# Patient Record
Sex: MALE | Race: WHITE | NOT HISPANIC OR LATINO | Employment: OTHER | URBAN - METROPOLITAN AREA
[De-identification: names, ages, dates, MRNs, and addresses within clinical notes are randomized per-mention and may not be internally consistent; named-entity substitution may affect disease eponyms.]

---

## 2019-07-23 ENCOUNTER — OFFICE VISIT (OUTPATIENT)
Dept: FAMILY MEDICINE | Facility: CLINIC | Age: 59
End: 2019-07-23
Payer: COMMERCIAL

## 2019-07-23 VITALS
BODY MASS INDEX: 33.66 KG/M2 | HEIGHT: 65 IN | WEIGHT: 202 LBS | HEART RATE: 106 BPM | TEMPERATURE: 98.2 F | DIASTOLIC BLOOD PRESSURE: 68 MMHG | SYSTOLIC BLOOD PRESSURE: 122 MMHG | OXYGEN SATURATION: 96 %

## 2019-07-23 DIAGNOSIS — M17.0 OSTEOARTHRITIS OF BOTH KNEES, UNSPECIFIED OSTEOARTHRITIS TYPE: ICD-10-CM

## 2019-07-23 DIAGNOSIS — G47.33 OSA (OBSTRUCTIVE SLEEP APNEA): ICD-10-CM

## 2019-07-23 DIAGNOSIS — G47.00 INSOMNIA, UNSPECIFIED TYPE: Primary | ICD-10-CM

## 2019-07-23 PROCEDURE — 99203 OFFICE O/P NEW LOW 30 MIN: CPT | Performed by: NURSE PRACTITIONER

## 2019-07-23 RX ORDER — SENNOSIDES 8.6 MG
650 CAPSULE ORAL EVERY 8 HOURS PRN
Qty: 100 TABLET | Refills: 2 | Status: ON HOLD | OUTPATIENT
Start: 2019-07-23 | End: 2021-03-24

## 2019-07-23 RX ORDER — MULTIPLE VITAMINS W/ MINERALS TAB 9MG-400MCG
1 TAB ORAL DAILY
COMMUNITY

## 2019-07-23 RX ORDER — HYDROXYZINE HYDROCHLORIDE 25 MG/1
25-50 TABLET, FILM COATED ORAL
Qty: 60 TABLET | Refills: 2 | Status: SHIPPED | OUTPATIENT
Start: 2019-07-23 | End: 2019-07-24

## 2019-07-23 ASSESSMENT — ANXIETY QUESTIONNAIRES
GAD7 TOTAL SCORE: 7
IF YOU CHECKED OFF ANY PROBLEMS ON THIS QUESTIONNAIRE, HOW DIFFICULT HAVE THESE PROBLEMS MADE IT FOR YOU TO DO YOUR WORK, TAKE CARE OF THINGS AT HOME, OR GET ALONG WITH OTHER PEOPLE: SOMEWHAT DIFFICULT
1. FEELING NERVOUS, ANXIOUS, OR ON EDGE: SEVERAL DAYS
7. FEELING AFRAID AS IF SOMETHING AWFUL MIGHT HAPPEN: SEVERAL DAYS
2. NOT BEING ABLE TO STOP OR CONTROL WORRYING: MORE THAN HALF THE DAYS
5. BEING SO RESTLESS THAT IT IS HARD TO SIT STILL: NOT AT ALL
3. WORRYING TOO MUCH ABOUT DIFFERENT THINGS: MORE THAN HALF THE DAYS
6. BECOMING EASILY ANNOYED OR IRRITABLE: NOT AT ALL

## 2019-07-23 ASSESSMENT — PATIENT HEALTH QUESTIONNAIRE - PHQ9
5. POOR APPETITE OR OVEREATING: SEVERAL DAYS
SUM OF ALL RESPONSES TO PHQ QUESTIONS 1-9: 6

## 2019-07-23 ASSESSMENT — MIFFLIN-ST. JEOR: SCORE: 1648.15

## 2019-07-23 NOTE — PROGRESS NOTES
Subjective     Meño Sánchez is a 59 year old male who presents to clinic today for the following health issues:    HPI   Insomnia  Onset:  Years , wakes up at 2 am and goes to bed maybe 8-9 am, pretty often   Difficulty with sleeping more than 1-2 hours at a time.     Previously used a CPAP for a short period.  History of TAYA.  He states that he lost weight and feels like things improved.    Sleep study approximately 3 years ago.        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   Caffeine intake: YES- diet cola   OTC decongestants: no but he has   Any new medications: but a friend gave him some OTC cleansing pills    Alleviating factors:  Self medicating (alcohol, etc.):  no    Therapies Tried and outcome: Melatonin - rarely works.      He reports rumination at nighttime.  Difficulty with stress.  Is vague regarding this.    Gets anxious regarding his health and when he is sick, he isn't able to sing.    Gets stressed about finances.   Intermitttent anxiety.      Joint Pain    Onset: years, had a right knee surgery- meniscus 5-6 years ago, thinks its from a previous work, used to walk a lot.    Description:   Location: left knee and right knee  Character: general pain    Intensity: moderate    Progression of Symptoms: worse    Accompanying Signs & Symptoms:  Other symptoms: none    History:    Previous similar pain: YES      Precipitating factors:   Trauma or overuse: no     Alleviating factors:  Improved by: rest/inactivity, exercise - does home exercise (Mo).      Therapies Tried and outcome: nothing, topical cream- hasn't really tried it.     Last seen by orthopedics at [facility] (6/6/17) for bilateral knee pain, severe medial compartment degenerative joint disease (left knee), early to mild degenerative joint disease/chrondromalacia (right knee), ACL ligament degeneration (right knee).   Didn't complete physical therapy as previously recommended.  Steroid injections were not helpful.      History of right knee arthroscopic partial medial meniscectomy (8/3/2012).      Previous clinic:  [facility], 4-5 years ago.      , currently single.  1 child - daughter who is 24 years old.   Is working part-time - singing.        Patient Active Problem List   Diagnosis     Family history of early CAD     Elevated hemoglobin A1c     History of gout     History of knee surgery     Hyperlipidemia     Obstructive sleep apnea syndrome     Past Surgical History:   Procedure Laterality Date     HC TOOTH EXTRACTION W/FORCEP      wisdom tooth x 4       Social History     Tobacco Use     Smoking status: Former Smoker     Smokeless tobacco: Never Used     Tobacco comment: 8 cigarettes per day   Substance Use Topics     Alcohol use: Yes     Comment: 2 / beers qd     Family History   Problem Relation Age of Onset     Cancer - colorectal Father         dx at age 67     Cardiovascular Father         AAA s/p rupture and repair     C.A.D. Father      Family History Negative Mother          Current Outpatient Medications   Medication Sig Dispense Refill     acetaminophen (TYLENOL) 650 MG CR tablet Take 1 tablet (650 mg) by mouth every 8 hours as needed for mild pain or fever 100 tablet 2     co-enzyme Q-10 30 MG CAPS capsule Take by mouth daily       multivitamin w/minerals (MULTI-VITAMIN)  "tablet Take 1 tablet by mouth daily       hydrOXYzine (VISTARIL) 25 MG capsule Take 1-2 capsules (25-50 mg) by mouth nightly as needed for other (insomnia) 60 capsule 3     Allergies   Allergen Reactions     No Known Drug Allergies        Reviewed and updated as needed this visit by Provider         Review of Systems   ROS COMP: Constitutional, HEENT, cardiovascular, pulmonary, gi and gu systems are negative, except as otherwise noted.      Objective    /68 (BP Location: Right arm, Patient Position: Sitting, Cuff Size: Adult Regular)   Pulse 106   Temp 98.2  F (36.8  C) (Oral)   Ht 1.651 m (5' 5\")   Wt 91.6 kg (202 lb)   SpO2 96%   BMI 33.61 kg/m    Body mass index is 33.61 kg/m .  Physical Exam   GENERAL: healthy, alert and no distress  RESP: lungs clear to auscultation - no rales, rhonchi or wheezes  CV: regular rate and rhythm, normal S1 S2, no S3 or S4, no murmur, click or rub, no peripheral edema and peripheral pulses strong  MS: bilateral knees with full ROM (pain elicited with full flexion and extension), non tender to palpation, no erythema or warmth to joints, no joint instability  NEURO: Normal strength and tone, mentation intact and speech normal  PSYCH: mentation appears normal and affect flat        Assessment & Plan     Meño was seen today for musculoskeletal problem.    Diagnoses and all orders for this visit:    Insomnia, unspecified type  -     hydrOXYzine (ATARAX) 25 MG tablet; Take 1-2 tablets (25-50 mg) by mouth nightly as needed for other (insomnia)    TAYA (obstructive sleep apnea)  -     SLEEP EVALUATION & MANAGEMENT REFERRAL - [facility]  [phone] (Age 18 and up); Future    Osteoarthritis of both knees, unspecified osteoarthritis type  Last seen by orthopedics at [facility] (6/6/17) for bilateral knee pain, severe medial compartment degenerative joint disease (left knee), early to mild degenerative joint disease/chrondromalacia (right " knee), ACL ligament degeneration (right knee).   Didn't complete physical therapy as previously recommended.  Steroid injections were not helpful.    History of right knee arthroscopic partial medial meniscectomy (8/3/2012).       -     acetaminophen (TYLENOL) 650 MG CR tablet; Take 1 tablet (650 mg) by mouth every 8 hours as needed for mild pain or fever  -     [facility] REFERRAL; Future - encouraged initiation of physical therapy.   -     With no improvement or worsening of symptoms, will plan consultation with orthopedics.            Return in about 1 month (around 8/23/2019) for Physical Exam/fasting lab work.    LILI Esqueda [facility]

## 2019-07-24 ENCOUNTER — TELEPHONE (OUTPATIENT)
Dept: FAMILY MEDICINE | Facility: CLINIC | Age: 59
End: 2019-07-24

## 2019-07-24 DIAGNOSIS — G47.09 OTHER INSOMNIA: Primary | ICD-10-CM

## 2019-07-24 RX ORDER — HYDROXYZINE PAMOATE 25 MG/1
25-50 CAPSULE ORAL
Qty: 60 CAPSULE | Refills: 3 | Status: SHIPPED | OUTPATIENT
Start: 2019-07-24 | End: 2020-10-15

## 2019-07-24 ASSESSMENT — ANXIETY QUESTIONNAIRES: GAD7 TOTAL SCORE: 7

## 2019-07-24 NOTE — TELEPHONE ENCOUNTER
Prior Authorization Retail Medication Request    Medication/Dose: Hydroxyzine HCL 25mg tabs  ICD code (if different than what is on RX):    Previously Tried and Failed:  See chart  Rationale:      Insurance Name:  Not listed  Insurance ID:  [id number]      Pharmacy Information (if different than what is on RX)  Name:  Shanel HOANG  Phone:  [phone]    Prescriber, please advise on if you want to change medication or initiate PA.  Corina Gaitan

## 2019-08-13 ENCOUNTER — THERAPY VISIT (OUTPATIENT)
Dept: PHYSICAL THERAPY | Facility: CLINIC | Age: 59
End: 2019-08-13
Attending: NURSE PRACTITIONER
Payer: COMMERCIAL

## 2019-08-13 DIAGNOSIS — M25.561 BILATERAL KNEE PAIN: ICD-10-CM

## 2019-08-13 DIAGNOSIS — M25.562 BILATERAL KNEE PAIN: ICD-10-CM

## 2019-08-13 DIAGNOSIS — M17.0 OSTEOARTHRITIS OF BOTH KNEES, UNSPECIFIED OSTEOARTHRITIS TYPE: ICD-10-CM

## 2019-08-13 PROCEDURE — 97161 PT EVAL LOW COMPLEX 20 MIN: CPT | Mod: GP | Performed by: PHYSICAL THERAPIST

## 2019-08-13 PROCEDURE — 97110 THERAPEUTIC EXERCISES: CPT | Mod: GP | Performed by: PHYSICAL THERAPIST

## 2019-08-13 ASSESSMENT — ACTIVITIES OF DAILY LIVING (ADL)
WALK: ACTIVITY IS FAIRLY DIFFICULT
SQUAT: ACTIVITY IS MINIMALLY DIFFICULT
HOW_WOULD_YOU_RATE_THE_CURRENT_FUNCTION_OF_YOUR_KNEE_DURING_YOUR_USUAL_DAILY_ACTIVITIES_ON_A_SCALE_FROM_0_TO_100_WITH_100_BEING_YOUR_LEVEL_OF_KNEE_FUNCTION_PRIOR_TO_YOUR_INJURY_AND_0_BEING_THE_INABILITY_TO_PERFORM_ANY_OF_YOUR_USUAL_DAILY_ACTIVITIES?: 40
SIT WITH YOUR KNEE BENT: ACTIVITY IS MINIMALLY DIFFICULT
WEAKNESS: THE SYMPTOM AFFECTS MY ACTIVITY SEVERELY
KNEE_ACTIVITY_OF_DAILY_LIVING_SUM: 35
STAND: ACTIVITY IS SOMEWHAT DIFFICULT
RISE FROM A CHAIR: ACTIVITY IS FAIRLY DIFFICULT
STIFFNESS: THE SYMPTOM AFFECTS MY ACTIVITY SLIGHTLY
PAIN: THE SYMPTOM AFFECTS MY ACTIVITY MODERATELY
GIVING WAY, BUCKLING OR SHIFTING OF KNEE: THE SYMPTOM AFFECTS MY ACTIVITY SLIGHTLY
KNEEL ON THE FRONT OF YOUR KNEE: ACTIVITY IS VERY DIFFICULT
LIMPING: THE SYMPTOM AFFECTS MY ACTIVITY MODERATELY
GO DOWN STAIRS: ACTIVITY IS FAIRLY DIFFICULT
GO UP STAIRS: ACTIVITY IS VERY DIFFICULT
HOW_WOULD_YOU_RATE_THE_OVERALL_FUNCTION_OF_YOUR_KNEE_DURING_YOUR_USUAL_DAILY_ACTIVITIES?: ABNORMAL
RAW_SCORE: 35
AS_A_RESULT_OF_YOUR_KNEE_INJURY,_HOW_WOULD_YOU_RATE_YOUR_CURRENT_LEVEL_OF_DAILY_ACTIVITY?: ABNORMAL
KNEE_ACTIVITY_OF_DAILY_LIVING_SCORE: 50
SWELLING: I DO NOT HAVE THE SYMPTOM

## 2019-08-13 NOTE — PROGRESS NOTES
[facility] Initial Evaluation  Subjective:  The history is provided by the patient. No  was used.   Type of problem:  Bilateral knees   Condition occurred with:  Insidious onset. This is a chronic condition   Problem details: Patient reports a gradual worsening of knee pain beginning about 15 years ago.  Patient c/o pain going up/down stairs and with lifting.  Physical therapy was ordered 7/23/2019.  Patient denies any catching, instability, or swelling.  Right knee arthroscopy about 5 years ago for a mensicus tear.  Patient quit his job as a  a couple years ago d/t knee pain.   Patient reports pain:  Posterior.  Associated symptoms:  Loss of motion/stiffness. Symptoms are exacerbated by ascending stairs, descending stairs, transfers and other (lifting and carrying) and relieved by rest.    Where condition occurred: for unknown reasons.  and reported as 7/10 on pain scale. General health as reported by patient is fair. Pertinent medical history includes:  Overweight and sleep disorder/apnea.  Medical allergies: none.  Surgeries include:  Orthopedic surgery (right scope).     Primary job tasks include:  Lifting/carrying.  Pain is described as aching and is constant. Pain is worse in the A.M.. Since onset symptoms are gradually worsening. Special tests:  X-ray (a few years ago). Past treatment: none.    Patient is Musician - parttime.   Barriers include:  None as reported by patient.  Red flags:  None as reported by patient.                      Objective:  Standing Alignment:              Knee:  Genu varus L and genu varus R      Gait:    Gait Type:  Normal                                                           Knee Evaluation:  ROM:    AROM      Extension:  Left: 5    Right:  5  Flexion: Left: 120    Right: 115  PROM      Extension: Left: 5    Right:  5  Flexion: Left: 125    Right:  120    Endfeel: Firm  Strength:     Extension:  Left: 5/5    Pain:-      Right:  5/5    Pain:-  Flexion:  Left: 5-/5    Pain:+      Right: 5-/5    Pain:+    Quad Set Left: Fair    Pain:   Quad Set Right: Fair    Pain:  Ligament Testing:    Varus 0:  Left:  Neg   Right:  Neg  Varus 30:  Left:  Trace  Right:  Trace  Valgus 0:  Left:  Neg  Right:  Neg  Valgus 30:  Left:  Neg    Right:  Neg            Edema:  Edema of the knee: Mild chronic B.            General     ROS    Assessment/Plan:    Patient is a 59 year old male with both sides knee complaints.    Patient has the following significant findings with corresponding treatment plan.                Diagnosis 1:  B Knee OA/DJD  Pain -  self management, education and home program  Decreased ROM/flexibility - manual therapy, therapeutic exercise, therapeutic activity and home program  Decreased strength - therapeutic exercise, therapeutic activities and home program  Impaired muscle performance - neuro re-education and home program  Decreased function - therapeutic activities and home program    Therapy Evaluation Codes:   1) History comprised of:   Personal factors that impact the plan of care:      None.    Comorbidity factors that impact the plan of care are:      Overweight.     Medications impacting care: None.  2) Examination of Body Systems comprised of:   Body structures and functions that impact the plan of care:      Knee.   Activity limitations that impact the plan of care are:      Lifting, Squatting/kneeling and Stairs.  3) Clinical presentation characteristics are:   Stable/Uncomplicated.  4) Decision-Making    Low complexity using standardized patient assessment instrument and/or measureable assessment of functional outcome.  Cumulative Therapy Evaluation is: Low complexity.    Previous and current functional limitations:  (See Goal Flow Sheet for this information)    Short term and Long term goals: (See Goal Flow Sheet for this information)     Communication ability:  Patient appears to be able to clearly communicate and understand  verbal and written communication and follow directions correctly.  Treatment Explanation - The following has been discussed with the patient:   RX ordered/plan of care  Anticipated outcomes  Possible risks and side effects  This patient would benefit from PT intervention to resume normal activities.   Rehab potential is good.    Frequency:  1 X week, once daily  Duration:  for 6 weeks  Discharge Plan:  Achieve all LTG.  Independent in home treatment program.  Reach maximal therapeutic benefit.    Please refer to the daily flowsheet for treatment today, total treatment time and time spent performing 1:1 timed codes.

## 2020-07-31 ENCOUNTER — OFFICE VISIT (OUTPATIENT)
Dept: FAMILY MEDICINE | Facility: CLINIC | Age: 60
End: 2020-07-31
Payer: COMMERCIAL

## 2020-07-31 VITALS
HEART RATE: 130 BPM | HEIGHT: 65 IN | DIASTOLIC BLOOD PRESSURE: 88 MMHG | WEIGHT: 200 LBS | OXYGEN SATURATION: 95 % | TEMPERATURE: 98.5 F | SYSTOLIC BLOOD PRESSURE: 130 MMHG | BODY MASS INDEX: 33.32 KG/M2

## 2020-07-31 DIAGNOSIS — M25.562 PAIN IN BOTH KNEES, UNSPECIFIED CHRONICITY: ICD-10-CM

## 2020-07-31 DIAGNOSIS — M25.561 PAIN IN BOTH KNEES, UNSPECIFIED CHRONICITY: ICD-10-CM

## 2020-07-31 DIAGNOSIS — E11.9 TYPE 2 DIABETES MELLITUS WITHOUT COMPLICATION, WITHOUT LONG-TERM CURRENT USE OF INSULIN (H): Primary | ICD-10-CM

## 2020-07-31 DIAGNOSIS — E11.42 DIABETIC POLYNEUROPATHY ASSOCIATED WITH TYPE 2 DIABETES MELLITUS (H): ICD-10-CM

## 2020-07-31 LAB — HBA1C MFR BLD: 11 % (ref 0–5.6)

## 2020-07-31 PROCEDURE — 83036 HEMOGLOBIN GLYCOSYLATED A1C: CPT | Performed by: FAMILY MEDICINE

## 2020-07-31 PROCEDURE — 80053 COMPREHEN METABOLIC PANEL: CPT | Performed by: FAMILY MEDICINE

## 2020-07-31 PROCEDURE — 84443 ASSAY THYROID STIM HORMONE: CPT | Performed by: FAMILY MEDICINE

## 2020-07-31 PROCEDURE — 99214 OFFICE O/P EST MOD 30 MIN: CPT | Performed by: FAMILY MEDICINE

## 2020-07-31 PROCEDURE — 36415 COLL VENOUS BLD VENIPUNCTURE: CPT | Performed by: FAMILY MEDICINE

## 2020-07-31 RX ORDER — FLASH GLUCOSE SENSOR
1 KIT MISCELLANEOUS
Qty: 6 EACH | Refills: 3 | Status: SHIPPED | OUTPATIENT
Start: 2020-07-31 | End: 2021-09-15

## 2020-07-31 ASSESSMENT — MIFFLIN-ST. JEOR: SCORE: 1634.07

## 2020-07-31 NOTE — PROGRESS NOTES
"Subjective   Meño Sánchez is a 60 year old male who presents to clinic today for the following health issues:    HPI   Knee Pain,  toe numbness    Onset: ongoing knee pain 15 years ago - numbness x 2 years    Description:   Location: both knees and feet  Character: achy- numbness all the time    Intensity: moderate    Progression of Symptoms: started out with numbness - shooting pains in legs    Accompanying Signs & Symptoms:  Other symptoms: numbness and tingling    History:   Previous similar pain: YES  - had mensicus procedure     Precipitating factors:   Trauma or overuse: pt worked 23 years walking on hard floor.    Alleviating factors:  Improved by: rest/inactivity    Therapies Tried and outcome: rest- pt is retired and does not walk a lot now    Insomnia/anxiety  Chronic - rare caffeine - melatonin did not help much, hydroxyzine makes him feel buzzed,  Discuss CBD oil for sleep.  Has some anxiety symptoms. Panic attacks ~ 25 years ago - better now.    Reviewed and updated as needed this visit by provider:  Tobacco  Allergies  Meds  Problems  Med Hx  Surg Hx  Fam Hx         Review of Systems   Constitutional, HEENT, cardiovascular, pulmonary, GI, , musculoskeletal, neuro, skin, endocrine and psych systems are negative, except as otherwise noted.        Objective   /88   Pulse 130   Temp 98.5  F (36.9  C) (Tympanic)   Ht 1.651 m (5' 5\")   Wt 90.7 kg (200 lb)   SpO2 95%   BMI 33.28 kg/m   Body mass index is 33.28 kg/m .  Physical Exam   GENERAL: healthy, alert, well nourished, well hydrated, no distress  HENT: ear canals- normal; TMs- normal; Nose- normal; Mouth- no ulcers, no lesions  NECK: no tenderness, no adenopathy, no asymmetry, no masses, no stiffness; thyroid- normal to palpation  RESP: lungs clear to auscultation - no rales, no rhonchi, no wheezes  CV: regular rates and rhythm, normal S1 S2, no S3 or S4 and no murmur, no click or rub -  ABDOMEN: soft, no tenderness, no  " hepatosplenomegaly, no masses, normal bowel sounds  MS: Bilateral knees with slight varus deformity and medial joint line tenderness, ligaments intact and negative meniscal signs.  No effusion.  Good extension flexion but some pain with full flexion bilaterally.  Otherwise extremities- no gross deformities noted, no edema  SKIN: no suspicious lesions, no rashes  NEURO: strength and tone- normal, sensory exam- grossly normal, mentation- intact, speech- normal, reflexes- symmetric  Diabetic foot exam: normal DP and PT pulses, no trophic changes or ulcerative lesions and reduced sensation at across all toes and less so on the forefoot with monofilament test bilaterally      Diagnostic Test Results  Results for orders placed or performed in visit on 07/31/20 (from the past 24 hour(s))   Hemoglobin A1c   Result Value Ref Range    Hemoglobin A1C 11.0 (H) 0 - 5.6 %         Assessment & Plan   Type 2 diabetes mellitus without complication, without long-term current use of insulin (H)  Uncontrolled, not actively treated with diet nor exercise or medications.  Did do diabetic education years ago at another health system and is open to reeducation.  Will initiate metformin at 500 twice daily and increase to thousand twice daily as tolerated.  In addition we will start Jardiance.  Glucose meter prescription sent as well and eye referral.  - Hemoglobin A1c  - Comprehensive metabolic panel (BMP + Alb, Alk Phos, ALT, AST, Total. Bili, TP)  - TSH with free T4 reflex  - metFORMIN (GLUCOPHAGE) 500 MG tablet  Dispense: 360 tablet; Refill: 1  - Continuous Blood Gluc Sensor (FREESTYLE CHAYA 14 DAY SENSOR) MISC  Dispense: 6 each; Refill: 3  - AMBULATORY ADULT DIABETES EDUCATOR REFERRAL  - empagliflozin (JARDIANCE) 10 MG TABS tablet  Dispense: 90 tablet; Refill: 1  - OPHTHALMOLOGY ADULT REFERRAL    Pain in both knees, unspecified chronicity  Previous x-ray results reviewed showing medial compartment joint space narrowing.  Has some  tenderness in this region and pain with full flexion.  Ligaments intact  - Orthopedic & Spine  Referral    Peripheral polyneuropathy  Most likely diabetic related with A1c of 11 today.  Should improve with diabetic control.    - Comprehensive metabolic panel (BMP + Alb, Alk Phos, ALT, AST, Total. Bili, TP)  - TSH with free T4 reflex    See Patient Instructions    Return in about 1 month (around 8/31/2020) for Medication Recheck.          Aureliano Mckeon MD      [address] [address]  [email or web address]   Office: [phone]  Pager: [phone]

## 2020-07-31 NOTE — PATIENT INSTRUCTIONS
"[facility] [place] [facility]       Patient Education   Tips for Sleep Hygiene  \"Sleep hygiene\" means having good sleep habits.Follow these tips to sleep better at night:     Get on a schedule. Go to bed and get up at about the same time every day.    Listen to your body. Only try to sleep when you actually feel tired or sleepy.    Be patient. If you haven't been able to get to sleep after about 30 minutes or more, get up and do something calming or boring until you feel sleepy. Then return to bed and try again.    Don't have caffeine (coffee, tea, cola drinks, chocolate and some medicines), alcohol or nicotine (cigarettes). These can make it harder for you to fall asleep and stay asleep.    Use your bed for sleeping only. That means no TV, computer or homework in bed, especially during the evening and before bedtime.    Don't nap during the day. If you must nap, make sure it is for less than 20 minutes.    Create sleep rituals that remind your body it is time to sleep. Examples include breathing exercises, stretching or reading a book.    Avoid all electronic media (smart phone, computer, tablet) within 2 hours of bed time. The \"blue light\" in these devices activates the part of the brain that keeps you awake.    Dim the lights at night.    Get early morning sources of light (walk in the sunshine) to help set sleep patterns at night.    Try a bath or shower before bed. Having a warm bath 1 to 2 hours before bedtime can help you feel sleepy. Hot baths can make you alert, so be mindful of the temperature.    Don't watch the clock. Checking the clock during the night can wake you up. It can also lead to negative thoughts such as, \"I will never fall asleep,\" which can increase anxiety and sleeplessness.    Use a sleep diary. Track your sleep schedule to know your sleep patterns and to see where you can improve.    Get regular exercise every day. Try not to do heavy exercise in the 4 hours before bedtime.    Eat a " healthy, balanced diet.    Try eating a light, healthy snack before bed, but avoid eating a heavy meal.    Create the right sleeping area. A cool, dark, quiet room is best. If needed, try earplugs, fans and blackout curtains.    Keep your daytime routine the same even if you have a bad night sleep. Avoiding activities the next day can make it harder to sleep.  For informational purposes only. Not to replace the advice of your health care provider.   Copyright   2013 [facility]. All rights reserved. [employer] 507345 - 01/16.      Patient Education What Is Diabetes?  If you have diabetes, your body does not make enough insulin (a hormone), or the insulin it makes does not work the way it should. Diabetes is a lifelong disease.  Glucose (sugar) is your body's main source of energy. Insulin carries glucose from the bloodstream into your body's cells. But if you have diabetes, glucose builds up in your blood. This is called high blood glucose (high blood sugar).  High blood glucose can lead to damage in many parts of your body, including your eyes, kidneys, heart, blood vessels, nerves and skin.  What are the symptoms of diabetes?  Symptoms include:    Extreme thirst    Needing to urinate more often    Headache    Hunger    Blurred vision    Feeling drowsy or tired    Slow healing after an illness or injury    Frequent infections.  What should I do if I have diabetes?  Your first step is to learn how to manage your diabetes. The goal is to keep your blood glucose as close to normal as possible. (A normal level is 70 to 100.) By doing this, you can prevent or control damage to your body.  To manage your diabetes, you will need to:    Eat a wide range of healthy foods.    Manage your weight.    Be physically active.    Test your blood glucose as prescribed.    Control your blood pressure and cholesterol.    Take your medicines as prescribed.  Are there different kinds of diabetes?  There are two basic kinds  of diabetes: type 1 and type 2.  Type 1 diabetes  Type 1 diabetes occurs when the cells that make insulin are destroyed by your body's immune system. Your body can no longer make insulin on its own. People who have type 1 diabetes must take insulin to manage it.  Type 2 diabetes  Type 2 diabetes occurs when the cells of your body cannot use insulin or glucose normally. Over time, your body cannot make enough insulin to meet your body's needs. This is the most common kind of diabetes.  You are more likely to develop type 2 diabetes if you:    Are overweight    Have high blood pressure    Have high cholesterol    Are not physically active    Have a family history of type 2 diabetes    Are , /, ,  American or     Are a woman who has given birth to a baby weighing over 9 pounds, or you have had gestational diabetes (diabetes that occurs in pregnancy).  For informational purposes only. Not to replace the advice of your health care provider.  Copyright   2006 [facility]. All rights reserved. [employer] [id number] - REV 12/15.     Patient Education   Diet: Diabetes  Food is an important tool that you can use to control diabetes and stay healthy. Eating well-balanced meals in the correct amounts will help you control your blood glucose levels and prevent low blood sugar reactions. It will also help you reduce the health risks of diabetes. There is no one specific diet that is right for everyone with diabetes. But there are general guidelines to follow. A registered dietitian (RD) will create a tailored diet approach that s just right for you. He or she will also help you plan healthy meals and snacks. If you have any questions, call your dietitian for advice.     Guidelines for success  Talk with your healthcare provider before starting a diabetes diet or weight loss program. If you haven't talked with a dietitian yet, ask your provider for a  referral. The following guidelines can help you succeed:    Select foods from the 6 food groups below. Your dietitian will help you find food choices within each group. He or she will also show you serving sizes and how many servings you can have at each meal.  ? Grains, beans, and starchy vegetables  ? Vegetables  ? Fruit  ? Milk or yogurt  ? Meat, poultry, fish, or tofu  ? Healthy fats    Check your blood sugar levels as directed by your provider. Take any medicine as prescribed by your provider.    Learn to read food labels and pick the right portion sizes.    Eat only the amount of food in your meal plan. Eat about the same amount of food at regular times each day. Don t skip meals. Eat meals 4 to 5 hours apart, with snacks in between.    Limit alcohol. It raises blood sugar levels. Drink water or calorie-free diet drinks that use safe sweeteners.    Eat less fat to help lower your risk of heart disease. Use nonfat or low-fat dairy products and lean meats. Avoid fried foods. Use cooking oils that are unsaturated, such as olive, canola, or peanut oil.    Talk with your dietitian about safe sugar substitutes.    Avoid added salt. It can contribute to high blood pressure, which can cause heart disease. People with diabetes already have a risk of high blood pressure and heart disease.    Stay at a healthy weight. If you need to lose weight, cut down on your portion sizes. But don t skip meals. Exercise is an important part of any weight management program. Talk with your provider about an exercise program that s right for you.    For more information about the best diet plan for you, talk with a registered dietitian (RD). To find an RD in your area, contact:  ? Academy of Nutrition and Dietetics www.eatright.org  ? The American Diabetes Association [phone] www.diabetes.org  Date Last Reviewed: 8/1/2016 2000-2018 [employer]. [address] [place] [address], [place] [address]. All rights reserved. This  information is not intended as a substitute for professional medical care. Always follow your healthcare professional's instructions.         Patient Education   Healthy Meals for Diabetes     A healthcare provider will help you develop a meal plan that fits your needs.     Ask your healthcare team to help you make a meal plan that fits your needs. Your meal plan tells you when to eat your meals and snacks, what kinds of foods to eat, and how much of each food to eat. You don t have to give up all the foods you like. But you do need to follow some guidelines.  Choose healthy carbohydrates  Starches, sugars, and fiber are all types of carbohydrates. Fiber can help lower your cholesterol and triglycerides. Fiber is also healthy for your heart. You should have 20 to 35 grams of total fiber each day. Fiber-rich foods include:    Whole-grain breads and cereals    Bulgur wheat    Brown rice       Whole-wheat pasta    Fruits and vegetables    Dry beans, and peas   Keep track of the amount of carbohydrates you eat. This can help you keep the right balance of physical activity and medicine. The amount of carbohydrates needed will vary for each person. It depends on many things such as your health, the medicines you take, and how active you are. Your healthcare team will help you figure out the right amount of carbohydrates for you. You may start with around 45 to 60 grams of carbohydrates per meal, depending on your situation.   Here are some examples of foods containing about 15 grams of carbohydrates (1 serving of carbohydrates):    1/2 cup of canned or frozen fruit    A small piece of fresh fruit (4 ounces)    1 slice of bread    1/2 cup of oatmeal    1/3 cup of rice    4 to 6 crackers    1/2 English muffin    1/2 cup of black beans    1/4 of a large baked potato (3 ounces)    2/3 cup of plain fat-free yogurt    1 cup of soup    1/2 cup of casserole    6 chicken nuggets    2-inch-square brownie or cake without  frosting    2 small cookies    1/2 cup of ice cream or sherbet  Choose healthy protein foods  Eating protein that is low in fat can help you control your weight. It also helps keep your heart healthy. Low-fat protein foods include:    Fish    Plant proteins, such as dry beans and peas, nuts, and soy products like tofu and soymilk    Lean meat with all visible fat removed    Poultry with the skin removed    Low-fat or nonfat milk, cheese, and yogurt  Limit unhealthy fats and sugar  Saturated and trans fats are unhealthy for your heart. They raise LDL (bad) cholesterol. Fat is also high in calories, so it can make you gain weight. To cut down on unhealthy fats and sugar, limit these foods:    Butter or margarine    Palm and palm kernel oils and coconut oil    Cream    Cheese    Lozano    Lunch meats       Ice cream    Sweet bakery goods such as pies, muffins, and donuts    Jams and jellies    Candy bars    Regular sodas   How much to eat  The amount of food you eat affects your blood sugar. It also affects your weight. Your healthcare team will tell you how much of each type of food you should eat.    Use measuring cups and spoons and a food scale to measure serving sizes.    Learn what a correct serving size looks like on your plate. This will help when you are away from home and can t measure your servings.    Eat only the number of servings given on your meal plan for each food. Don t take seconds.    Learn to read food labels. Be sure to look at serving size, total carbohydrates, fiber, calories, sugar, and saturated and trans fats. Look for healthier alternatives to foods that have added sugar.    Plan ahead for parties so you can still have a good time without going overboard with unhealthy food choices. Set a good example yourself by bringing a healthy dish to pot lucks.   Choose healthy snacks  When it comes to snacks, we usually think about foods with added sugar and fats. But there are many other options for  healthier snack choices. Here are a few snack ideas to choose from:  Snacks with less than 5 grams of carbohydrates    1 piece of string cheese    3 celery sticks plus 1 tablespoon of peanut butter    5 cherry tomatoes plus 1 tablespoon of ranch dressing    1 hard-boiled egg    1/4 cup of fresh blueberries     5 baby carrots    1 cup of light popcorn    1/2 cup of sugar-free gelatin    15 almonds  Snacks with about 10 to 20 grams of carbohydrates    1/3 cup of hummus plus 1 cup of fresh cut nonstarchy vegetables (carrots, green peppers, broccoli, celery, or a combination)    1/2 cup of fresh or canned fruit plus 1/4 cup of cottage cheese    1/2 cup of tuna salad with 4 crackers    2 rice cakes and a tablespoon of peanut butter    1 small apple or orange    3 cups light popcorn    1/2 of a turkey sandwich (1 slice of whole-wheat bread, 2 ounces of turkey, and mustard)  Portion sizes are important to controlling your blood sugar and staying at a healthy weight. Stock up on healthy snack items so you always have them on hand.  When to eat  Your meal plan will likely include breakfast, lunch, dinner, and some snacks.    Try to eat your meals and snacks at about the same times each day.    Eat all your meals and snacks. Skipping a meal or snack can make your blood sugar drop too low. It can also cause you to eat too much at the next meal or snack. Then your blood sugar could get too high.  Date Last Reviewed: 7/1/2016 2000-2018 [employer]. [address] [place] [address]. All rights reserved. This information is not intended as a substitute for professional medical care. Always follow your healthcare professional's instructions.         Patient Education   Diabetes: Meal Planning    You can help keep your blood sugar level in your target range by eating healthy foods. Your healthcare team can help you create a low-fat, nutritious meal plan. Take an active role in your diabetes management by  following your meal plan and working with your healthcare team.  Make your meal plan  A meal plan gives guidelines for the types and amounts of food you should eat. The goal is to balance food and insulin (or other diabetes medications) so your blood sugars will be in your target range. Your dietitian will help you make a flexible meal plan that includes many foods that you like.  Watch serving sizes  Your meal plan will group foods by servings. To learn how much a serving is, start by measuring food portions at each meal. Soon you ll know what a serving looks like on your plate. Ask your healthcare provider about how to balance servings of different foods.  Eat from all the food groups  The basis of a healthy meal plan is variety (eating lots of different foods). Choose lean meats, fresh fruits and vegetables, whole grains, and low-fat or nonfat dairy products. Eating a wide variety of foods provides the nutrients your body needs. It can also keep you from getting bored with your meal plan.  Learn about carbohydrates, fats, and protein    Carbohydrates are starches, sugars, and fiber. They are found in many foods, including fruit, bread, pasta, milk, and sweets. Of all the foods you eat, carbohydrates have the most effect on your blood sugar. Your dietitian may teach you about carb counting, a way to figure out the number of carbohydrates in a meal.    Fats have the most calories. They also have the most effect on your weight and your risk of heart disease. When you have diabetes, it s important to control your weight and protect your heart. Foods that are high in fat include whole milk, cheese, snack foods, and desserts.    Protein is important for building and repairing muscles and bones. Choose low-fat protein sources, such as fish, egg whites, and skinless chicken.  Reduce liquid sugars  Extra calories from sodas, sports drinks, and fruit drinks make it hard to keep blood sugar in range. Cut as many liquid  sugars from your meal plan as you can.  This includes most fruit juices, which are often high in natural or added sugar. Instead, drink plenty of water and other sugar-free beverages.  Eat less fat  If you need to lose weight, try to reduce the amount of fat in your diet. This can also help lower your cholesterol level to keep blood vessels healthier. Cut fat by using only small amounts of liquid oil for cooking. Read food labels carefully to avoid foods with unhealthy trans fats.  Timing your meals  When it comes to blood sugar control, when you eat is as important as what you eat. You may need to eat several small meals spaced evenly throughout the day to stay in your target range. So don t skip breakfast or wait until late in the day to get most of your calories. Doing so can cause your blood sugar to rise too high or fall too low.   Date Last Reviewed: 3/1/2016    1736-8252 [employer]. [address] [place] [address]. All rights reserved. This information is not intended as a substitute for professional medical care. Always follow your healthcare professional's instructions.         Patient Education   Long-Term Complications of Diabetes    Diabetes can cause health problems over time. These are called complications. They are more likely to happen if your blood sugar is often too high. Over time, high blood sugar can damage blood vessels in your body. It is important to keep your blood sugar in your target range. This can help prevent or delay complications from diabetes.  Possible complications  Complications of diabetes include:    Eye problems, including damage to the blood vessels in the eyes (retinopathy), pressure in the eye (glaucoma), and clouding of the eye s lens (a cataract). Eye problems can eventually lead to irreversible blindness.     Tooth and gum problems (periodontal disease), causing loss of teeth and bone    Blood vessel (vascular) disease leading to circulation  problems, heart attack or stroke, or a need for amputation of a limb     Problems with sexual function leading to erectile dysfunction in men and sexual discomfort in women     Kidney disease (nephropathy) can eventually lead to kidney failure, which may require dialysis or kidney transplant     Nerve problems (neuropathy), causing pain or loss of feeling in your feet and other parts of your body, potentially leading to an amputation of a limb     High blood pressure (hypertension), putting strain on your heart and blood vessels    Serious infections, possibly leading to loss of toes, feet, or limbs  How to avoid complications  The serious consequences of these complications may be avoidable for most people with diabetes by managing your blood glucose, blood pressure, and cholesterol levels. This can help you feel better and stay healthy. You can manage diabetes by tracking your blood sugar. You can also eat healthy and exercise to avoid gaining weight. And you should take medicine if directed by your healthcare provider.  Date Last Reviewed: 5/1/2016 2000-2018 [employer]. [address] [place] [address]. All rights reserved. This information is not intended as a substitute for professional medical care. Always follow your healthcare professional's instructions.                Lab Results   Component Value Date    A1C 11.0 07/31/2020      Hemoglobin A1c   Average Blood Sugar    6%    135 mg/dL  7%    170 mg/dL  8%    205 mg/dL   9%    240 mg/dL   10%    275 mg/dL

## 2020-08-03 LAB
ALBUMIN SERPL-MCNC: 3.5 G/DL (ref 3.4–5)
ALP SERPL-CCNC: 102 U/L (ref 40–150)
ALT SERPL W P-5'-P-CCNC: 69 U/L (ref 0–70)
ANION GAP SERPL CALCULATED.3IONS-SCNC: 6 MMOL/L (ref 3–14)
AST SERPL W P-5'-P-CCNC: 54 U/L (ref 0–45)
BILIRUB SERPL-MCNC: 0.7 MG/DL (ref 0.2–1.3)
BUN SERPL-MCNC: 14 MG/DL (ref 7–30)
CALCIUM SERPL-MCNC: 8.8 MG/DL (ref 8.5–10.1)
CHLORIDE SERPL-SCNC: 101 MMOL/L (ref 94–109)
CO2 SERPL-SCNC: 25 MMOL/L (ref 20–32)
CREAT SERPL-MCNC: 1.23 MG/DL (ref 0.66–1.25)
GFR SERPL CREATININE-BSD FRML MDRD: 62 ML/MIN/{1.73_M2}
GLUCOSE SERPL-MCNC: 369 MG/DL (ref 70–99)
POTASSIUM SERPL-SCNC: 4 MMOL/L (ref 3.4–5.3)
PROT SERPL-MCNC: 8 G/DL (ref 6.8–8.8)
SODIUM SERPL-SCNC: 132 MMOL/L (ref 133–144)
TSH SERPL DL<=0.005 MIU/L-ACNC: 3.27 MU/L (ref 0.4–4)

## 2020-08-04 NOTE — RESULT ENCOUNTER NOTE
Dear Meño,    Here is a summary of your recent test results:  -Liver and gallbladder tests (ALT,AST, Alk phos,bilirubin) are essentially normal.  -Kidney function (GFR) is normal.  -Sodium is slight decreased.  ADVISE: rechecking this in 3 months.  -Potassium is normal.  -Calcium is normal.  -Glucose is elevated and a sign diabetes as discussed    -A1C (diabetic test) is elevated and a sign of diabetes.      -TSH (thyroid stimulating hormone) level is normal which indicates normal thyroid function.    For additional lab test information, labtestsonline.org is an excellent reference.    In addition, here is a list of due or overdue Health Maintenance reminders:  Kidney Microalbumin Urine Test due  Eye Exam due   Pneumococcal Vaccine(1 of 1 - PPSV23) due   Preventive Care Visit due   Cholesterol Lab due   Zoster (Shingles) Vaccine(1 of 2) due  Diptheria Tetanus Pertussis (DTAP/TDAP/TD) Vaccine(2 - Td) due on 08/02/2017    Please call us at [phone] (or use [employer]) to address the above recommendations if needed.    I cannot get the Dr. Alan edwards out of my head!           Thank you very much for trusting me and [facility].     Healthy regards,  Aureliano Mckeon MD

## 2020-08-05 ENCOUNTER — OFFICE VISIT (OUTPATIENT)
Dept: ORTHOPEDICS | Facility: CLINIC | Age: 60
End: 2020-08-05
Attending: FAMILY MEDICINE
Payer: COMMERCIAL

## 2020-08-05 ENCOUNTER — ANCILLARY PROCEDURE (OUTPATIENT)
Dept: GENERAL RADIOLOGY | Facility: CLINIC | Age: 60
End: 2020-08-05
Attending: ORTHOPAEDIC SURGERY
Payer: COMMERCIAL

## 2020-08-05 VITALS
HEIGHT: 65 IN | BODY MASS INDEX: 33.02 KG/M2 | SYSTOLIC BLOOD PRESSURE: 132 MMHG | DIASTOLIC BLOOD PRESSURE: 88 MMHG | WEIGHT: 198.2 LBS

## 2020-08-05 DIAGNOSIS — M25.561 PAIN IN BOTH KNEES, UNSPECIFIED CHRONICITY: ICD-10-CM

## 2020-08-05 DIAGNOSIS — M25.562 PAIN IN BOTH KNEES, UNSPECIFIED CHRONICITY: ICD-10-CM

## 2020-08-05 PROCEDURE — 73562 X-RAY EXAM OF KNEE 3: CPT | Mod: LT | Performed by: ORTHOPAEDIC SURGERY

## 2020-08-05 PROCEDURE — 99203 OFFICE O/P NEW LOW 30 MIN: CPT | Performed by: ORTHOPAEDIC SURGERY

## 2020-08-05 ASSESSMENT — MIFFLIN-ST. JEOR: SCORE: 1625.91

## 2020-08-05 NOTE — PROGRESS NOTES
HISTORY OF PRESENT ILLNESS:    Meño Sánchez is a 60 year old male who is seen in consultation at the request of Dr. Mckeon for bilateral knee pain, left greater than right.   He has been dealing with ongoing pain for many years.  He had been seen by a couple of different orthopedic surgeons in the past.  Care elsewhere of record shows that he was a seen at [facility] orthopedics in 2016.  He has gone through physical therapy as well as cortisone injections which did not really provide any lasting impact.  He has limited walking ability.  He is able to go shopping.  He wakes up at night but not distally from the knee.  About a month or 2 ago, he started having a lot of pain which prompted him to come in today.  In the meantime his pain has gotten somewhat better partly because he is resting more.  He is also not standing as much since he is retired.  Most the pain is in the medial aspect and anterior aspect.    Present symptoms: Left knee pain greater than right  Treatments tried to this point: 1 previous session of Physical Therapy,   Orthopedic PMH:  diabetic A1C on 7/31/20: 11.0    Past Medical History:   Diagnosis Date     Generalized anxiety disorder     occasional anxiety -usually when tired     H/O knee surgery 08/03/2012    right knee arthroscopic partial medial meniscectomy     Palpitations 1990s    negative w/u incld holter monitor       Past Surgical History:   Procedure Laterality Date     HC TOOTH EXTRACTION W/FORCEP  1976    wisdom tooth x 4     KNEE SURGERY Right 2005       Family History   Problem Relation Age of Onset     Cardiovascular Father         AAA s/p rupture and repair     Coronary Artery Disease Father      Colon Cancer Father         dx at age 67     Melanoma Father      Arthritis Mother      No Known Problems Sister      Cerebrovascular Disease Brother      Cancer Maternal Grandmother      No Known Problems Sister      No Known Problems Sister      No Known Problems Sister      No Known  Problems Sister      No Known Problems Daughter      Colon Cancer Paternal Aunt      Diabetes No family hx of      Hypertension No family hx of      Breast Cancer No family hx of      Prostate Cancer No family hx of        Social History     Socioeconomic History     Marital status: Single     Spouse name: Not on file     Number of children: 1     Years of education: Not on file     Highest education level: Not on file   Occupational History     Occupation: supervisor- maint.     Employer: [employer]   Social Needs     Financial resource strain: Not on file     Food insecurity     Worry: Not on file     Inability: Not on file     Transportation needs     Medical: Not on file     Non-medical: Not on file   Tobacco Use     Smoking status: Former Smoker     Smokeless tobacco: Never Used     Tobacco comment: 8 cigarettes per day   Substance and Sexual Activity     Alcohol use: Yes     Comment: 2 / beers qd     Drug use: No     Sexual activity: Not on file   Lifestyle     Physical activity     Days per week: Not on file     Minutes per session: Not on file     Stress: Not on file   Relationships     Social connections     Talks on phone: Not on file     Gets together: Not on file     Attends Alevism service: Not on file     Active member of club or organization: Not on file     Attends meetings of clubs or organizations: Not on file     Relationship status: Not on file     Intimate partner violence     Fear of current or ex partner: Not on file     Emotionally abused: Not on file     Physically abused: Not on file     Forced sexual activity: Not on file   Other Topics Concern     Not on file   Social History Narrative     Not on file       Current Outpatient Medications   Medication Sig Dispense Refill     acetaminophen (TYLENOL) 650 MG CR tablet Take 1 tablet (650 mg) by mouth every 8 hours as needed for mild pain or fever 100 tablet 2     Continuous Blood Gluc Sensor (FREESTYLE CHAYA 14 DAY SENSOR) MISC 1 Device  every 14 days 6 each 3     empagliflozin (JARDIANCE) 10 MG TABS tablet Take 1 tablet (10 mg) by mouth every morning 90 tablet 1     hydrOXYzine (VISTARIL) 25 MG capsule Take 1-2 capsules (25-50 mg) by mouth nightly as needed for other (insomnia) 60 capsule 3     metFORMIN (GLUCOPHAGE) 500 MG tablet Take 1-2 tablets (500-1,000 mg) by mouth 2 times daily (with meals) 360 tablet 1     multivitamin w/minerals (MULTI-VITAMIN) tablet Take 1 tablet by mouth daily         No Known Allergies    REVIEW OF SYSTEMS:  CONSTITUTIONAL:  NEGATIVE for fever, chills, change in weight  INTEGUMENTARY/SKIN:  NEGATIVE for worrisome rashes, moles or lesions  EYES:  NEGATIVE for vision changes or irritation  ENT/MOUTH:  NEGATIVE for ear, mouth and throat problems  RESP:  NEGATIVE for significant cough or SOB  BREAST:  NEGATIVE for masses, tenderness or discharge  CV:  Irregular heartbeats  GI:  NEGATIVE for nausea, abdominal pain, heartburn, or change in bowel habits  :  Negative   MUSCULOSKELETAL:  See HPI above  NEURO:  Dizziness, paresthesia  ENDOCRINE:  NEGATIVE for temperature intolerance, skin/hair changes  HEME/ALLERGY/IMMUNE:  NEGATIVE for bleeding problems  PSYCHIATRIC:  anxiety    PHYSICAL EXAM:  There were no vitals taken for this visit.  There is no height or weight on file to calculate BMI.   GENERAL APPEARANCE: healthy, alert and no distress   HEENT: No apparent thyroid megaly. Clear sclera with normal ocular movement  RESPIRATORY: No labored breathing  SKIN: no suspicious lesions or rashes  NEURO: Normal strength and tone, mentation intact and speech normal  VASCULAR: Good pulses, and capillary refill   LYMPH: no lymphadenopathy   PSYCH:  mentation appears normal and affect normal/bright    MUSCULOSKELETAL:    He stands with varus knees   he is able to get up readily from sitting  Range of motion is 2-120 degrees, bilateral  Varus deformities, worse on the left  Extensor mechanism is intact  No erythema noted  Significant  patellofemoral crepitus, worse on the left  Bilateral medial joint line pain, worse on the left  Motor function is grossly intact  Circulation is intact  Sensation is intact      ASSESSMENT:  Chronic bilateral knee DJD, worse on the left  Poorly controlled diabetes    PLAN:  X-rays of both knees from today were visualized.  He is basically bone-on-bone in the medial compartment.  Moderate degenerative changes at the patellofemoral joint are noted in the left knee and mild degenerative changes of the patellofemoral joint of the right knee are noted.  He has tried physical therapy and cortisone injections in the past.  Given the advanced the degree of arthritis in both knees, he will be a candidate for knee replacement.  With high A1c level in the last few days, his not quite ready for considering knee replacement.  He will be a risk patient for infection.  Once his diabetes is better controlled, if he decided to go ahead with a knee replacement, he should contact our surgery scheduler.  We had a long discussion as to the details of the replacement.  We talked about potential risks, possibility of pain persisting after the surgery, overall typical healing time, overnight hospitalization, potential risks of blood clot etc.  Informational materials provided today as well.      Imaging Interpretation:   Severe medial joint space narrowing to the point of bone-on-bone in the left knee and near bone-on-bone on the right knee.  Moderate patellofemoral degenerative changes in the left knee and mild patellofemoral degenerative changes of the right knee noted.  Otherwise no acute fractures or other osseous pathology are noted.          Shantanu Washburn MD  Department of Orthopedic Surgery        Disclaimer: This note consists of symbols derived from keyboarding, dictation and/or voice recognition software. As a result, there may be errors in the script that have gone undetected. Please consider this when interpreting information found  in this chart.

## 2020-08-05 NOTE — LETTER
8/5/2020         RE: Meño Sánchez  [address] [place] [address]        Dear Colleague,    Thank you for referring your patient, Meño Sánchez, to the [facility] ORTHOPEDIC SURGERY. Please see a copy of my visit note below.    HISTORY OF PRESENT ILLNESS:    Meño Sánchez is a 60 year old male who is seen in consultation at the request of Dr. Mckeon for bilateral knee pain, left greater than right.   He has been dealing with ongoing pain for many years.  He had been seen by a couple of different orthopedic surgeons in the past.  Care elsewhere of record shows that he was a seen at [facility] orthopedics in 2016.  He has gone through physical therapy as well as cortisone injections which did not really provide any lasting impact.  He has limited walking ability.  He is able to go shopping.  He wakes up at night but not distally from the knee.  About a month or 2 ago, he started having a lot of pain which prompted him to come in today.  In the meantime his pain has gotten somewhat better partly because he is resting more.  He is also not standing as much since he is retired.  Most the pain is in the medial aspect and anterior aspect.    Present symptoms: Left knee pain greater than right  Treatments tried to this point: 1 previous session of Physical Therapy,   Orthopedic PMH:  diabetic A1C on 7/31/20: 11.0    Past Medical History:   Diagnosis Date     Generalized anxiety disorder     occasional anxiety -usually when tired     H/O knee surgery 08/03/2012    right knee arthroscopic partial medial meniscectomy     Palpitations 1990s    negative w/u incld holter monitor       Past Surgical History:   Procedure Laterality Date     HC TOOTH EXTRACTION W/FORCEP  1976    wisdom tooth x 4     KNEE SURGERY Right 2005       Family History   Problem Relation Age of Onset     Cardiovascular Father         AAA s/p rupture and repair     Coronary Artery Disease Father      Colon Cancer Father         dx at age  67     Melanoma Father      Arthritis Mother      No Known Problems Sister      Cerebrovascular Disease Brother      Cancer Maternal Grandmother      No Known Problems Sister      No Known Problems Sister      No Known Problems Sister      No Known Problems Sister      No Known Problems Daughter      Colon Cancer Paternal Aunt      Diabetes No family hx of      Hypertension No family hx of      Breast Cancer No family hx of      Prostate Cancer No family hx of        Social History     Socioeconomic History     Marital status: Single     Spouse name: Not on file     Number of children: 1     Years of education: Not on file     Highest education level: Not on file   Occupational History     Occupation: supervisor- maint.     Employer: [employer]   Social Needs     Financial resource strain: Not on file     Food insecurity     Worry: Not on file     Inability: Not on file     Transportation needs     Medical: Not on file     Non-medical: Not on file   Tobacco Use     Smoking status: Former Smoker     Smokeless tobacco: Never Used     Tobacco comment: 8 cigarettes per day   Substance and Sexual Activity     Alcohol use: Yes     Comment: 2 / beers qd     Drug use: No     Sexual activity: Not on file   Lifestyle     Physical activity     Days per week: Not on file     Minutes per session: Not on file     Stress: Not on file   Relationships     Social connections     Talks on phone: Not on file     Gets together: Not on file     Attends Orthodoxy service: Not on file     Active member of club or organization: Not on file     Attends meetings of clubs or organizations: Not on file     Relationship status: Not on file     Intimate partner violence     Fear of current or ex partner: Not on file     Emotionally abused: Not on file     Physically abused: Not on file     Forced sexual activity: Not on file   Other Topics Concern     Not on file   Social History Narrative     Not on file       Current Outpatient Medications    Medication Sig Dispense Refill     acetaminophen (TYLENOL) 650 MG CR tablet Take 1 tablet (650 mg) by mouth every 8 hours as needed for mild pain or fever 100 tablet 2     Continuous Blood Gluc Sensor (FREESTYLE CHAYA 14 DAY SENSOR) MIS 1 Device every 14 days 6 each 3     empagliflozin (JARDIANCE) 10 MG TABS tablet Take 1 tablet (10 mg) by mouth every morning 90 tablet 1     hydrOXYzine (VISTARIL) 25 MG capsule Take 1-2 capsules (25-50 mg) by mouth nightly as needed for other (insomnia) 60 capsule 3     metFORMIN (GLUCOPHAGE) 500 MG tablet Take 1-2 tablets (500-1,000 mg) by mouth 2 times daily (with meals) 360 tablet 1     multivitamin w/minerals (MULTI-VITAMIN) tablet Take 1 tablet by mouth daily         No Known Allergies    REVIEW OF SYSTEMS:  CONSTITUTIONAL:  NEGATIVE for fever, chills, change in weight  INTEGUMENTARY/SKIN:  NEGATIVE for worrisome rashes, moles or lesions  EYES:  NEGATIVE for vision changes or irritation  ENT/MOUTH:  NEGATIVE for ear, mouth and throat problems  RESP:  NEGATIVE for significant cough or SOB  BREAST:  NEGATIVE for masses, tenderness or discharge  CV:  Irregular heartbeats  GI:  NEGATIVE for nausea, abdominal pain, heartburn, or change in bowel habits  :  Negative   MUSCULOSKELETAL:  See HPI above  NEURO:  Dizziness, paresthesia  ENDOCRINE:  NEGATIVE for temperature intolerance, skin/hair changes  HEME/ALLERGY/IMMUNE:  NEGATIVE for bleeding problems  PSYCHIATRIC:  anxiety    PHYSICAL EXAM:  There were no vitals taken for this visit.  There is no height or weight on file to calculate BMI.   GENERAL APPEARANCE: healthy, alert and no distress   HEENT: No apparent thyroid megaly. Clear sclera with normal ocular movement  RESPIRATORY: No labored breathing  SKIN: no suspicious lesions or rashes  NEURO: Normal strength and tone, mentation intact and speech normal  VASCULAR: Good pulses, and capillary refill   LYMPH: no lymphadenopathy   PSYCH:  mentation appears normal and affect  normal/bright    MUSCULOSKELETAL:    He stands with varus knees   he is able to get up readily from sitting  Range of motion is 2-120 degrees, bilateral  Varus deformities, worse on the left  Extensor mechanism is intact  No erythema noted  Significant patellofemoral crepitus, worse on the left  Bilateral medial joint line pain, worse on the left  Motor function is grossly intact  Circulation is intact  Sensation is intact      ASSESSMENT:  Chronic bilateral knee DJD, worse on the left  Poorly controlled diabetes    PLAN:  X-rays of both knees from today were visualized.  He is basically bone-on-bone in the medial compartment.  Moderate degenerative changes at the patellofemoral joint are noted in the left knee and mild degenerative changes of the patellofemoral joint of the right knee are noted.  He has tried physical therapy and cortisone injections in the past.  Given the advanced the degree of arthritis in both knees, he will be a candidate for knee replacement.  With high A1c level in the last few days, his not quite ready for considering knee replacement.  He will be a risk patient for infection.  Once his diabetes is better controlled, if he decided to go ahead with a knee replacement, he should contact our surgery scheduler.  We had a long discussion as to the details of the replacement.  We talked about potential risks, possibility of pain persisting after the surgery, overall typical healing time, overnight hospitalization, potential risks of blood clot etc.  Informational materials provided today as well.      Imaging Interpretation:   Severe medial joint space narrowing to the point of bone-on-bone in the left knee and near bone-on-bone on the right knee.  Moderate patellofemoral degenerative changes in the left knee and mild patellofemoral degenerative changes of the right knee noted.  Otherwise no acute fractures or other osseous pathology are noted.          Shantanu Washburn MD  Department of Orthopedic  Surgery        Disclaimer: This note consists of symbols derived from keyboarding, dictation and/or voice recognition software. As a result, there may be errors in the script that have gone undetected. Please consider this when interpreting information found in this chart.      Again, thank you for allowing me to participate in the care of your patient.        Sincerely,        Shantanu Washburn MD

## 2020-10-13 NOTE — PROGRESS NOTES
SUBJECTIVE:   CC: Meño Sánchez is an 60 year old male who presents for preventive health visit.       Answers for HPI/ROS submitted by the patient on 10/15/2020   Annual Exam:  Frequency of exercise:: 4-5 days/week  Getting at least 3 servings of Calcium per day:: NO  Diet:: Diabetic  Taking medications regularly:: Yes  Medication side effects:: Other  Bi-annual eye exam:: NO  Dental care twice a year:: NO  Sleep apnea or symptoms of sleep apnea:: Sleep apnea  Additional concerns today:: No  Duration of exercise:: 30-45 minutes - walk/ [place]    Diabetes Follow-up    How often are you checking your blood sugar? Continuous glucose monitor some spikes with meals  What time of day are you checking your blood sugars (select all that apply)?  Every 1/2 hour  Have you had any blood sugars above 200?  Once or twice  Have you had any blood sugars below 70?  No    What symptoms do you notice when your blood sugar is low?  None    What concerns do you have today about your diabetes? None     Do you have any of these symptoms? (Select all that apply)  Numbness in feet    Have you had a diabetic eye exam in the last 12 months? No    Sleep is better lately      BP Readings from Last 2 Encounters:   10/15/20 138/80   08/05/20 132/88     Hemoglobin A1C (%)   Date Value   10/15/2020 6.4 (H)   07/31/2020 11.0 (H)     LDL Cholesterol Calculated (mg/dL)   Date Value   03/26/2004 95               Today's PHQ-2 Score:   PHQ-2 ( 1999 Pf[employer]) 10/15/2020 10/15/2020   Q1: Little interest or pleasure in doing things 0 0   Q2: Feeling down, depressed or hopeless 0 1   PHQ-2 Score 0 1   Q1: Little interest or pleasure in doing things Not at all -   Q2: Feeling down, depressed or hopeless Several days -   PHQ-2 Score 1 -       Abuse: Current or Past(Physical, Sexual or Emotional)- No  Do you feel safe in your environment? Yes    Have you ever done Advance Care Planning? (For example, a Health Directive, POLST, or a discussion with a medical  "provider or your loved ones about your wishes): No, advance care planning information given to patient to review.  Patient plans to discuss their wishes with loved ones or provider.      Social History     Tobacco Use     Smoking status: Former Smoker     Packs/day: 0.20     Smokeless tobacco: Former User     Quit date: 2010     Tobacco comment: 15 cig per week   Substance Use Topics     Alcohol use: Yes     Comment: 2 / beers qd or 2-3 glsses of wine     If you drink alcohol do you typically have >3 drinks per day or >7 drinks per week? No                      Last PSA:   PSA   Date Value Ref Range Status   03/26/2004 0.42 0 - 4 ug/L Final       Reviewed orders with patient. Reviewed health maintenance and updated orders accordingly - Yes  Reviewed and updated as needed this visit by clinical staff  Tobacco  Allergies  Meds  Problems  Med Hx  Surg Hx  Fam Hx  Soc Hx          Reviewed and updated as needed this visit by Provider  Tobacco  Allergies  Meds  Problems  Med Hx  Surg Hx  Fam Hx           ROS:  Constitutional, HEENT, cardiovascular, pulmonary, GI, , musculoskeletal, neuro, skin, endocrine and psych systems are negative, except as otherwise noted.  OBJECTIVE:   /80   Pulse 118   Temp 97  F (36.1  C) (Tympanic)   Ht 1.651 m (5' 5\")   Wt 88 kg (194 lb)   SpO2 98%   BMI 32.28 kg/m    EXAM:  GENERAL: healthy, alert and no distress  EYES: Eyes grossly normal to inspection, PERRL and conjunctivae and sclerae normal  HENT: ear canals and TM's normal, nose and mouth without ulcers or lesions  NECK: no adenopathy, no asymmetry, masses, or scars and thyroid normal to palpation  RESP: lungs clear to auscultation - no rales, rhonchi or wheezes  BREAST: normal without masses, tenderness or nipple discharge and no palpable axillary masses or adenopathy  CV: regular rate and rhythm, normal S1 S2, no S3 or S4, no murmur, click or rub, no peripheral edema and peripheral pulses strong  ABDOMEN: " soft, nontender, no hepatosplenomegaly, no masses and bowel sounds normal   (male): normal male genitalia without lesions or urethral discharge, no hernia  MS: no gross musculoskeletal defects noted, no edema  SKIN: no suspicious lesions or rashes  NEURO: Normal strength and tone, mentation intact and speech normal  PSYCH: mentation appears normal, affect normal/bright  LYMPH: no cervical, supraclavicular, axillary, or inguinal adenopathy  RECTAL: declined exam  Diabetic foot exam: normal DP and PT pulses, no trophic changes or ulcerative lesions and normal sensory exam    ASSESSMENT/PLAN:   Routine general medical examination at a health care facility      Type 2 diabetes mellitus without complication, without long-term current use of insulin (H)  Controlled now- continue medication.   - Albumin Random Urine Quantitative with Creat Ratio  - empagliflozin (JARDIANCE) 10 MG TABS tablet  Dispense: 90 tablet; Refill: 1  - metFORMIN (GLUCOPHAGE) 500 MG tablet  Dispense: 360 tablet; Refill: 1  - Hemoglobin A1c  - EYE ADULT REFERRAL    Hyperlipidemia LDL goal <70  If elevated LDL > 70 then start rosuvastatin  - Lipid panel reflex to direct LDL Fasting  - rosuvastatin (CRESTOR) 5 MG tablet  Dispense: 90 tablet; Refill: 3  - Lipid panel reflex to direct LDL Fasting    Other insomnia  - hydrOXYzine (VISTARIL) 25 MG capsule  Dispense: 60 capsule; Refill: 3    TAYA (obstructive sleep apnea)  Better with weight reduction    Vitamin D deficiency  hx    Need for vaccination  - C RIV4 (FLUBLOK) VACCINE RECOMBINANT DNA PRSRV ANTIBIO FREE, IM [32769]  - [place] VACCINE ADMINISTRATION, INITIAL  - [place] VACCINE ADMINISTRATION, EACH ADDITIONAL  - TD (ADULT, 7+) PRESERVE FREE  - Pneumococcal vaccine 23 valent PPSV23  (Pneumovax) [[id number]]    Screening for prostate cancer  - Prostate spec antigen screen    Screening, deficiency anemia, iron  - CBC with platelets      COUNSELING:  Reviewed preventive health counseling, as reflected in patient  "instructions    Estimated body mass index is 32.28 kg/m  as calculated from the following:    Height as of this encounter: 1.651 m (5' 5\").    Weight as of this encounter: 88 kg (194 lb).  Weight management plan: Discussed healthy diet and exercise guidelines     reports that he has quit smoking. He smoked 0.20 packs per day. He quit smokeless tobacco use about 10 years ago.      Return in about 6 months (around 4/15/2021) for Medication Recheck, in person, with Dr Aureliano Mckeon.           Aureliano Mckeon MD     [address] [address]  [email or web address]   Office: [phone]  Pager: [phone]     "

## 2020-10-15 ENCOUNTER — OFFICE VISIT (OUTPATIENT)
Dept: FAMILY MEDICINE | Facility: CLINIC | Age: 60
End: 2020-10-15
Payer: COMMERCIAL

## 2020-10-15 VITALS
SYSTOLIC BLOOD PRESSURE: 138 MMHG | BODY MASS INDEX: 32.32 KG/M2 | HEART RATE: 118 BPM | TEMPERATURE: 97 F | WEIGHT: 194 LBS | HEIGHT: 65 IN | DIASTOLIC BLOOD PRESSURE: 80 MMHG | OXYGEN SATURATION: 98 %

## 2020-10-15 DIAGNOSIS — E11.9 TYPE 2 DIABETES MELLITUS WITHOUT COMPLICATION, WITHOUT LONG-TERM CURRENT USE OF INSULIN (H): ICD-10-CM

## 2020-10-15 DIAGNOSIS — E78.5 HYPERLIPIDEMIA LDL GOAL <70: ICD-10-CM

## 2020-10-15 DIAGNOSIS — G47.09 OTHER INSOMNIA: ICD-10-CM

## 2020-10-15 DIAGNOSIS — Z23 NEED FOR VACCINATION: ICD-10-CM

## 2020-10-15 DIAGNOSIS — Z12.5 SCREENING FOR PROSTATE CANCER: ICD-10-CM

## 2020-10-15 DIAGNOSIS — E55.9 VITAMIN D DEFICIENCY: ICD-10-CM

## 2020-10-15 DIAGNOSIS — Z13.0 SCREENING, DEFICIENCY ANEMIA, IRON: ICD-10-CM

## 2020-10-15 DIAGNOSIS — Z00.00 ROUTINE GENERAL MEDICAL EXAMINATION AT A HEALTH CARE FACILITY: Primary | ICD-10-CM

## 2020-10-15 DIAGNOSIS — G47.33 OSA (OBSTRUCTIVE SLEEP APNEA): ICD-10-CM

## 2020-10-15 LAB
ERYTHROCYTE [DISTWIDTH] IN BLOOD BY AUTOMATED COUNT: 14.3 % (ref 10–15)
HBA1C MFR BLD: 6.4 % (ref 0–5.6)
HCT VFR BLD AUTO: 45.6 % (ref 40–53)
HGB BLD-MCNC: 15.3 G/DL (ref 13.3–17.7)
MCH RBC QN AUTO: 35 PG (ref 26.5–33)
MCHC RBC AUTO-ENTMCNC: 33.6 G/DL (ref 31.5–36.5)
MCV RBC AUTO: 104 FL (ref 78–100)
PLATELET # BLD AUTO: 219 10E9/L (ref 150–450)
RBC # BLD AUTO: 4.37 10E12/L (ref 4.4–5.9)
WBC # BLD AUTO: 5.1 10E9/L (ref 4–11)

## 2020-10-15 PROCEDURE — 90471 IMMUNIZATION ADMIN: CPT | Performed by: FAMILY MEDICINE

## 2020-10-15 PROCEDURE — 99396 PREV VISIT EST AGE 40-64: CPT | Mod: 25 | Performed by: FAMILY MEDICINE

## 2020-10-15 PROCEDURE — 83036 HEMOGLOBIN GLYCOSYLATED A1C: CPT | Performed by: FAMILY MEDICINE

## 2020-10-15 PROCEDURE — 90472 IMMUNIZATION ADMIN EACH ADD: CPT | Performed by: FAMILY MEDICINE

## 2020-10-15 PROCEDURE — 80061 LIPID PANEL: CPT | Performed by: FAMILY MEDICINE

## 2020-10-15 PROCEDURE — 36415 COLL VENOUS BLD VENIPUNCTURE: CPT | Performed by: FAMILY MEDICINE

## 2020-10-15 PROCEDURE — 90714 TD VACC NO PRESV 7 YRS+ IM: CPT | Performed by: FAMILY MEDICINE

## 2020-10-15 PROCEDURE — 82043 UR ALBUMIN QUANTITATIVE: CPT | Performed by: FAMILY MEDICINE

## 2020-10-15 PROCEDURE — 90732 PPSV23 VACC 2 YRS+ SUBQ/IM: CPT | Performed by: FAMILY MEDICINE

## 2020-10-15 PROCEDURE — G0103 PSA SCREENING: HCPCS | Performed by: FAMILY MEDICINE

## 2020-10-15 PROCEDURE — 85027 COMPLETE CBC AUTOMATED: CPT | Performed by: FAMILY MEDICINE

## 2020-10-15 PROCEDURE — 90682 RIV4 VACC RECOMBINANT DNA IM: CPT | Performed by: FAMILY MEDICINE

## 2020-10-15 RX ORDER — ROSUVASTATIN CALCIUM 5 MG/1
5 TABLET, COATED ORAL DAILY
Qty: 90 TABLET | Refills: 3 | Status: SHIPPED | OUTPATIENT
Start: 2020-10-15 | End: 2021-10-20

## 2020-10-15 RX ORDER — HYDROXYZINE PAMOATE 25 MG/1
25-50 CAPSULE ORAL
Qty: 60 CAPSULE | Refills: 3 | Status: SHIPPED | OUTPATIENT
Start: 2020-10-15 | End: 2021-10-28

## 2020-10-15 SDOH — HEALTH STABILITY: MENTAL HEALTH: HOW OFTEN DO YOU HAVE A DRINK CONTAINING ALCOHOL?: NOT ASKED

## 2020-10-15 SDOH — HEALTH STABILITY: MENTAL HEALTH: HOW OFTEN DO YOU HAVE 6 OR MORE DRINKS ON ONE OCCASION?: NOT ASKED

## 2020-10-15 SDOH — HEALTH STABILITY: MENTAL HEALTH: HOW MANY STANDARD DRINKS CONTAINING ALCOHOL DO YOU HAVE ON A TYPICAL DAY?: NOT ASKED

## 2020-10-15 ASSESSMENT — MIFFLIN-ST. JEOR: SCORE: 1606.86

## 2020-10-16 LAB
CHOLEST SERPL-MCNC: 127 MG/DL
CREAT UR-MCNC: 278 MG/DL
HDLC SERPL-MCNC: 76 MG/DL
LDLC SERPL CALC-MCNC: 33 MG/DL
MICROALBUMIN UR-MCNC: 50 MG/L
MICROALBUMIN/CREAT UR: 17.84 MG/G CR (ref 0–17)
NONHDLC SERPL-MCNC: 51 MG/DL
PSA SERPL-ACNC: 0.24 UG/L (ref 0–4)
TRIGL SERPL-MCNC: 92 MG/DL

## 2020-10-17 NOTE — RESULT ENCOUNTER NOTE
Dear Meño,    Here is a summary of your recent test results:  -Normal red blood cell (hgb) levels, normal white blood cell count and normal platelet levels.   -A1C (test of diabetes control the last 2-3 months) is at your goal. Please continue with your current plan. Also, you should make an appointment to see me and recheck your A1C test in 6 months.   -PSA (prostate specific antigen) test is normal.  This indicates a low likelihood of prostate cancer.  ADVISE: rechecking this in 1 year.   -Microalbumin (urine protein) level is elevated. This is suggestive of early damage to your kidneys from diabetes.  ADVISE: avoiding anti-inflamatory agents such as ibuprofen (Advil, Motrin) or naproxen (Aleve) as much as possible, keeping your blood pressure in a normal range.  Also, this should be rechecked in 1 year.       For additional lab test information, labtestsonline.org is an excellent reference.    In addition, here is a list of due or overdue Health Maintenance reminders:  Eye Exam due on 1960  Zoster (Shingles) Vaccine(1 of 2) due on 07/04/2008    Please call us at [phone] (or use [employer]) to address the above recommendations if needed.           Thank you very much for trusting me and [facility].     Have a peaceful day.    Healthy regards,  Aureliano Mckeon MD

## 2020-10-23 ENCOUNTER — TELEPHONE (OUTPATIENT)
Dept: FAMILY MEDICINE | Facility: CLINIC | Age: 60
End: 2020-10-23

## 2020-10-23 NOTE — TELEPHONE ENCOUNTER
Diabetes Education Scheduling Outreach #1:    Call to patient to schedule. Left message with phone number to call to schedule.    Plan for 2nd outreach attempt within 1 week.    Sofia Sorensen  [place] [facility]  Diabetes and Nutrition Scheduling

## 2020-12-07 NOTE — TELEPHONE ENCOUNTER
Diabetes Education Scheduling Outreach #2:    Call to patient to schedule. Left message with phone number to call to schedule.    Sofia Sorensen  [place] [facility]  Diabetes and Nutrition Scheduling

## 2020-12-21 ENCOUNTER — DOCUMENTATION ONLY (OUTPATIENT)
Dept: OTHER | Facility: CLINIC | Age: 60
End: 2020-12-21

## 2021-01-23 ENCOUNTER — HEALTH MAINTENANCE LETTER (OUTPATIENT)
Age: 61
End: 2021-01-23

## 2021-02-03 ENCOUNTER — OFFICE VISIT (OUTPATIENT)
Dept: ORTHOPEDICS | Facility: CLINIC | Age: 61
End: 2021-02-03
Payer: COMMERCIAL

## 2021-02-03 VITALS
DIASTOLIC BLOOD PRESSURE: 70 MMHG | BODY MASS INDEX: 31.99 KG/M2 | WEIGHT: 192 LBS | SYSTOLIC BLOOD PRESSURE: 102 MMHG | HEIGHT: 65 IN

## 2021-02-03 DIAGNOSIS — E11.42 DIABETIC POLYNEUROPATHY ASSOCIATED WITH TYPE 2 DIABETES MELLITUS (H): ICD-10-CM

## 2021-02-03 DIAGNOSIS — M17.12 PRIMARY OSTEOARTHRITIS OF LEFT KNEE: Primary | ICD-10-CM

## 2021-02-03 PROCEDURE — 99213 OFFICE O/P EST LOW 20 MIN: CPT | Performed by: ORTHOPAEDIC SURGERY

## 2021-02-03 ASSESSMENT — KOOS JR
TWISING OR PIVOTING ON KNEE: SEVERE
BENDING TO THE FLOOR TO PICK UP OBJECT: SEVERE
HOW SEVERE IS YOUR KNEE STIFFNESS AFTER FIRST WAKING IN MORNING: SEVERE
KOOS JR SCORING: 34.17
STANDING UPRIGHT: SEVERE
STRAIGHTENING KNEE FULLY: SEVERE
RISING FROM SITTING: SEVERE
GOING UP OR DOWN STAIRS: SEVERE

## 2021-02-03 ASSESSMENT — MIFFLIN-ST. JEOR: SCORE: 1597.79

## 2021-02-03 NOTE — PROGRESS NOTES
"HISTORY OF PRESENT ILLNESS:    Meño Sánchez is a 60 year old male who is seen in follow up for bilateral knee pain. Patient was previously seen on 8/5/20.  Since this visit he reports worsening knee pain, left knee > right knee.   Present symptoms: bilateral knee pain. Pain located to the medial and anterior aspects. He reports increasing pain with walking and use of stairs. He notes sensation of buckling due to pain. His knee pain does not wake him at nighttime. He is able to walk at casual pace no greater than 1 hour.   Current pain level: 5/10, while walking pain reaches 8/10, Worst pain level: 9/10   Treatments tried to this point: has been working out using rowing machine, Physical Therapy x1 session, occasional use of knee compression braces.  A1C dated 10/15/20: 6.4  Past Medical History: Unchanged from the visit of 8/5/20. Please refer to that note.    REVIEW OF SYSTEMS:  CONSTITUTIONAL:  NEGATIVE for fever, chills, change in weight  INTEGUMENTARY/SKIN:  NEGATIVE for worrisome rashes, moles or lesions  EYES:  NEGATIVE for vision changes or irritation  ENT/MOUTH:  NEGATIVE for ear, mouth and throat problems  RESP:  NEGATIVE for significant cough or SOB  BREAST:  NEGATIVE for masses, tenderness or discharge  CV:  Irregular heartbeats  GI:  NEGATIVE for nausea, abdominal pain, heartburn, or change in bowel habits  :  Negative   MUSCULOSKELETAL:  See HPI above  NEURO:  Dizziness, paresthesia  ENDOCRINE:  NEGATIVE for temperature intolerance, skin/hair changes  HEME/ALLERGY/IMMUNE:  NEGATIVE for bleeding problems  PSYCHIATRIC:  anxietyt    PHYSICAL EXAM:  /70 (BP Location: Right arm, Patient Position: Chair, Cuff Size: Adult Regular)   Ht 1.651 m (5' 5\")   Wt 87.1 kg (192 lb)   BMI 31.95 kg/m    Body mass index is 31.95 kg/m .   GENERAL APPEARANCE: healthy and no distress   SKIN: no suspicious lesions or rashes  NEURO: Normal strength and tone, mentation intact and speech normal  VASCULAR:  good " pulses, and cappillary refill   LYMPH: no lymphadenopathy   PSYCH:  mentation appears normal and affect normal/bright    MSK:  Not in acute distress  Able to get up from sitting with minimal difficulty  Mild limping noted  Slow gait  No erythema or significant effusion but left knee is slightly larger  Lack of extension by 4 degrees, left  Varus deformity as noted before  Extensor mechanism is intact  Medial joint line pain  Patellofemoral pain, left knee    IMAGING INTERPRETATION:  None taken today.     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  He feels better overall and his neuropathy symptoms have improved somewhat.  He wants to proceed with total knee arthroplasty as we outlined previously.  We again went over the details of surgery, hospitalization course and potential risks including infection, stiffness and unexplained pain etc.  Informational material was provided for him to take home.  He was given a antibiotic soap for him to use as well as presurgery packet.  All the questions were answered.           Shantanu Washburn MD  Dept. Orthopedic Surgery  [facility]       Disclaimer: This note consists of symbols derived from keyboarding, dictation and/or voice recognition software. As a result, there may be errors in the script that have gone undetected. Please consider this when interpreting information found in this chart.

## 2021-02-03 NOTE — LETTER
2/3/2021         RE: Meño Sánchez  [address] [place] [address]        Dear Colleague,    Thank you for referring your patient, Meño Sánchez, to the [facility] [place]. Please see a copy of my visit note below.    HISTORY OF PRESENT ILLNESS:    Meño Sánchez is a 60 year old male who is seen in follow up for bilateral knee pain. Patient was previously seen on 8/5/20.  Since this visit he reports worsening knee pain, left knee > right knee.   Present symptoms: bilateral knee pain. Pain located to the medial and anterior aspects. He reports increasing pain with walking and use of stairs. He notes sensation of buckling due to pain. His knee pain does not wake him at nighttime. He is able to walk at casual pace no greater than 1 hour.   Current pain level: 5/10, while walking pain reaches 8/10, Worst pain level: 9/10   Treatments tried to this point: has been working out using rowing machine, Physical Therapy x1 session, occasional use of knee compression braces.  A1C dated 10/15/20: 6.4  Past Medical History: Unchanged from the visit of 8/5/20. Please refer to that note.    REVIEW OF SYSTEMS:  CONSTITUTIONAL:  NEGATIVE for fever, chills, change in weight  INTEGUMENTARY/SKIN:  NEGATIVE for worrisome rashes, moles or lesions  EYES:  NEGATIVE for vision changes or irritation  ENT/MOUTH:  NEGATIVE for ear, mouth and throat problems  RESP:  NEGATIVE for significant cough or SOB  BREAST:  NEGATIVE for masses, tenderness or discharge  CV:  Irregular heartbeats  GI:  NEGATIVE for nausea, abdominal pain, heartburn, or change in bowel habits  :  Negative   MUSCULOSKELETAL:  See HPI above  NEURO:  Dizziness, paresthesia  ENDOCRINE:  NEGATIVE for temperature intolerance, skin/hair changes  HEME/ALLERGY/IMMUNE:  NEGATIVE for bleeding problems  PSYCHIATRIC:  anxietyt    PHYSICAL EXAM:  /70 (BP Location: Right arm, Patient Position: Chair, Cuff Size: Adult Regular)   Ht  "1.651 m (5' 5\")   Wt 87.1 kg (192 lb)   BMI 31.95 kg/m    Body mass index is 31.95 kg/m .   GENERAL APPEARANCE: healthy and no distress   SKIN: no suspicious lesions or rashes  NEURO: Normal strength and tone, mentation intact and speech normal  VASCULAR:  good pulses, and cappillary refill   LYMPH: no lymphadenopathy   PSYCH:  mentation appears normal and affect normal/bright    MSK:  Not in acute distress  Able to get up from sitting with minimal difficulty  Mild limping noted  Slow gait  No erythema or significant effusion but left knee is slightly larger  Lack of extension by 4 degrees, left  Varus deformity as noted before  Extensor mechanism is intact  Medial joint line pain  Patellofemoral pain, left knee    IMAGING INTERPRETATION:  None taken today.     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  He feels better overall and his neuropathy symptoms have improved somewhat.  He wants to proceed with total knee arthroplasty as we outlined previously.  We again went over the details of surgery, hospitalization course and potential risks including infection, stiffness and unexplained pain etc.  Informational material was provided for him to take home.  He was given a antibiotic soap for him to use as well as presurgery packet.  All the questions were answered.           Shantanu Washburn MD  Dept. Orthopedic Surgery  [facility]       Disclaimer: This note consists of symbols derived from keyboarding, dictation and/or voice recognition software. As a result, there may be errors in the script that have gone undetected. Please consider this when interpreting information found in this chart.        Again, thank you for allowing me to participate in the care of your patient.        Sincerely,        Shantanu Washburn MD    "

## 2021-02-15 ENCOUNTER — PREP FOR PROCEDURE (OUTPATIENT)
Dept: ORTHOPEDICS | Facility: CLINIC | Age: 61
End: 2021-02-15

## 2021-02-15 DIAGNOSIS — M17.12 PRIMARY OSTEOARTHRITIS OF LEFT KNEE: Primary | ICD-10-CM

## 2021-02-16 ENCOUNTER — TELEPHONE (OUTPATIENT)
Dept: ORTHOPEDICS | Facility: CLINIC | Age: 61
End: 2021-02-16

## 2021-02-16 DIAGNOSIS — Z47.89 ORTHOPEDIC AFTERCARE: ICD-10-CM

## 2021-02-16 DIAGNOSIS — Z11.59 ENCOUNTER FOR SCREENING FOR OTHER VIRAL DISEASES: Primary | ICD-10-CM

## 2021-02-16 DIAGNOSIS — Z96.659 STATUS POST TOTAL KNEE REPLACEMENT: ICD-10-CM

## 2021-02-16 PROBLEM — M17.12 PRIMARY OSTEOARTHRITIS OF LEFT KNEE: Status: ACTIVE | Noted: 2021-02-16

## 2021-02-16 NOTE — TELEPHONE ENCOUNTER
Scheduled surgery.     ATC: please review and sign PT order. Also place COVID test.     Type of surgery: left tka  Location of surgery: [facility]  Date and time of surgery: 3/23/21 @ 1030am   Surgeon: Maddison   Pre-Op Appt Date: 3/15/21  Post-Op Appt Date: 4/5/21   Packet sent out: Yes  Pre-cert/Authorization completed:  No  Date: 2/16/21      Jada Sorensen, Surgery Scheduler

## 2021-02-23 ENCOUNTER — MYC MEDICAL ADVICE (OUTPATIENT)
Dept: FAMILY MEDICINE | Facility: CLINIC | Age: 61
End: 2021-02-23

## 2021-02-24 NOTE — TELEPHONE ENCOUNTER
LOV 10/15/2020    Pt needs to be seen for this     My chart message sent     Lola Hendrix RN, BSN  [facility]

## 2021-02-25 RX ORDER — CHOLECALCIFEROL (VITAMIN D3) 50 MCG
1 TABLET ORAL DAILY
COMMUNITY

## 2021-02-25 ASSESSMENT — MIFFLIN-ST. JEOR: SCORE: 1588.71

## 2021-03-14 NOTE — PHARMACY-ADMISSION MEDICATION HISTORY
PTA meds completed by pre-admitting nurse, Irene Chambers RN, and reviewed by pharmacy     Prior to Admission medications    Medication Sig Last Dose Taking? Auth Provider   acetaminophen (TYLENOL) 650 MG CR tablet Take 1 tablet (650 mg) by mouth every 8 hours as needed for mild pain or fever  Yes Juli Glaser APRN CNP   empagliflozin (JARDIANCE) 10 MG TABS tablet Take 1 tablet (10 mg) by mouth every morning  Yes Alan Mckeon MD   hydrOXYzine (VISTARIL) 25 MG capsule Take 1-2 capsules (25-50 mg) by mouth nightly as needed for other (insomnia)  Yes Alan Mckeon MD   metFORMIN (GLUCOPHAGE) 500 MG tablet Take 1-2 tablets (500-1,000 mg) by mouth 2 times daily (with meals)  Patient taking differently: Take 1,000 mg by mouth 2 times daily (with meals)   Yes Alan Mckeon MD   multivitamin w/minerals (MULTI-VITAMIN) tablet Take 1 tablet by mouth daily  Yes Reported, Patient   rosuvastatin (CRESTOR) 5 MG tablet Take 1 tablet (5 mg) by mouth daily  Yes Alan Mckeon MD   vitamin D3 (CHOLECALCIFEROL) 50 mcg (2000 units) tablet Take 1 tablet by mouth daily  Yes Reported, Patient   Continuous Blood Gluc Sensor (FREESTYLE CHAYA 14 DAY SENSOR) [facility] Device every 14 days   Alan Mckeon MD

## 2021-03-15 ENCOUNTER — OFFICE VISIT (OUTPATIENT)
Dept: FAMILY MEDICINE | Facility: CLINIC | Age: 61
End: 2021-03-15
Payer: COMMERCIAL

## 2021-03-15 VITALS
HEART RATE: 105 BPM | TEMPERATURE: 97.1 F | OXYGEN SATURATION: 98 % | SYSTOLIC BLOOD PRESSURE: 120 MMHG | WEIGHT: 186 LBS | HEIGHT: 65 IN | DIASTOLIC BLOOD PRESSURE: 80 MMHG | BODY MASS INDEX: 30.99 KG/M2 | RESPIRATION RATE: 18 BRPM

## 2021-03-15 DIAGNOSIS — M17.10 ARTHRITIS OF KNEE: ICD-10-CM

## 2021-03-15 DIAGNOSIS — G47.33 OBSTRUCTIVE SLEEP APNEA SYNDROME: ICD-10-CM

## 2021-03-15 DIAGNOSIS — E11.9 TYPE 2 DIABETES MELLITUS WITHOUT COMPLICATION, WITHOUT LONG-TERM CURRENT USE OF INSULIN (H): ICD-10-CM

## 2021-03-15 DIAGNOSIS — E11.42 DIABETIC POLYNEUROPATHY ASSOCIATED WITH TYPE 2 DIABETES MELLITUS (H): ICD-10-CM

## 2021-03-15 DIAGNOSIS — Z01.818 PREOP GENERAL PHYSICAL EXAM: Primary | ICD-10-CM

## 2021-03-15 LAB — HBA1C MFR BLD: 6.2 % (ref 0–5.6)

## 2021-03-15 PROCEDURE — 36415 COLL VENOUS BLD VENIPUNCTURE: CPT | Performed by: FAMILY MEDICINE

## 2021-03-15 PROCEDURE — 99214 OFFICE O/P EST MOD 30 MIN: CPT | Performed by: FAMILY MEDICINE

## 2021-03-15 PROCEDURE — 83036 HEMOGLOBIN GLYCOSYLATED A1C: CPT | Performed by: FAMILY MEDICINE

## 2021-03-15 ASSESSMENT — MIFFLIN-ST. JEOR: SCORE: 1570.57

## 2021-03-15 ASSESSMENT — PAIN SCALES - GENERAL: PAINLEVEL: SEVERE PAIN (6)

## 2021-03-15 NOTE — H&P (VIEW-ONLY)
[address] [address]  Phone: [phone]  Primary Provider: Perlita Walker  Pre-op Performing Provider: PERLITA WALKER    PREOPERATIVE EVALUATION:  Today's date: 3/15/2021    Meño Sánchez is a 60 year old male who presents for a preoperative evaluation.    Surgical Information:  Surgery/Procedure: Left total knee arthroplasty  Surgery Location: [facility]  Surgeon: Dr Washburn  Surgery Date: 03/23/2021  Time of Surgery: 10:30 AM  Where patient plans to recover: At home with family  Fax number for surgical facility: Note does not need to be faxed, will be available electronically in Epic.    Type of Anesthesia Anticipated: Choice    Assessment & Plan     The proposed surgical procedure is considered INTERMEDIATE risk.    Preop general physical exam      Arthritis of knees      Obstructive sleep apnea syndrome  Mild - no cpap    Type 2 diabetes mellitus without complication, without long-term current use of insulin (H) / Diabetic polyneuropathy associated with type 2 diabetes mellitus (H)  Controlled - continue medication.   - Hemoglobin A1c= 6.2           Risks and Recommendations:  The patient has the following additional risks and recommendations for perioperative complications:  Diabetes:  - Patient is not on insulin therapy: regular NPO guidelines can be followed.     Medication Instructions:  Patient is to take all scheduled medications on the day of surgery EXCEPT for modifications listed below:   - metformin: HOLD day of surgery.   - SGLT2 Inhibitor (canagliflozin, dapagliflozin, or empagliflozin): HOLD 3 days before surgery.     RECOMMENDATION:  APPROVAL GIVEN to proceed with proposed procedure, without further diagnostic evaluation.        Aureliano Walker MD     [address] [address]  Office: [phone]  [email or web address]         Subjective     HPI related  to upcoming procedure: left knee pain -       Preop Questions 3/15/2021   1. Have you ever had a heart attack or stroke? No   2. Have you ever had surgery on your heart or blood vessels, such as a stent placement, a coronary artery bypass, or surgery on an artery in your head, neck, heart, or legs? No   3. Do you have chest pain with activity? No   4. Do you have a history of  heart failure? No   5. Do you currently have a cold, bronchitis or symptoms of other infection? No   6. Do you have a cough, shortness of breath, or wheezing? No   7. Do you or anyone in your family have previous history of blood clots? No   8. Do you or does anyone in your family have a serious bleeding problem such as prolonged bleeding following surgeries or cuts? No   9. Have you ever had problems with anemia or been told to take iron pills? No   10. Have you had any abnormal blood loss such as black, tarry or bloody stools? No   11. Have you ever had a blood transfusion? No   12. Are you willing to have a blood transfusion if it is medically needed before, during, or after your surgery? Yes   13. Have you or any of your relatives ever had problems with anesthesia? No   14. Do you have sleep apnea, excessive snoring or daytime drowsiness? UNKNOWN - snores - had sleep study and borderline abnormal but never followed up.  Tired often,  Has been told he snores.    15. Do you have any artifical heart valves or other implanted medical devices like a pacemaker, defibrillator, or continuous glucose monitor? No   16. Do you have artificial joints? No   17. Are you allergic to latex? No     Health Care Directive:  Patient has a Health Care Directive on file      Preoperative Review of :   reviewed - no record of controlled substances prescribed.     Status of Chronic Conditions:  DIABETES - Patient has a  history of DiabetesType Type II . Patient is being treated with diet, oral agents and exercise and denies significant side effects. Control  has been good. Complicating factors include but are not limited to: hypertension and hyperlipidemia.       Review of Systems  CONSTITUTIONAL: NEGATIVE for fever, chills, change in weight  ENT/MOUTH: NEGATIVE for ear, mouth and throat problems  RESP: NEGATIVE for significant cough or SOB  CV: NEGATIVE for chest pain, palpitations or peripheral edema    Patient Active Problem List    Diagnosis Date Noted     Type 2 diabetes mellitus without complication, without long-term current use of insulin (H) 07/31/2020     Priority: High     Diabetic polyneuropathy associated with type 2 diabetes mellitus (H) 07/31/2020     Priority: High     Hyperlipidemia LDL goal <70 10/20/2016     Priority: High     Primary osteoarthritis of left knee 02/16/2021     Priority: Medium     Added automatically from request for surgery [id number]       Chronic instability of knee 10/28/2016     Priority: Medium     Chronic instability of knee, right       Obstructive sleep apnea syndrome 10/21/2015     Priority: Medium     Post-traumatic osteoarthritis of right knee 06/06/2012     Priority: Medium     Vitamin D deficiency 09/01/2011     Priority: Medium      Past Medical History:   Diagnosis Date     Diabetes (H)     type 2     Family history of early CAD 8/30/2011     Generalized anxiety disorder     occasional anxiety -usually when tired     H/O knee surgery 08/03/2012    right knee arthroscopic partial medial meniscectomy     History of gout 10/21/2015     Irregular heart beat      Osteoarthritis     knee     Other and unspecified derangement of medial meniscus 6/6/2012     Palpitations 1990s    negative w/u incld holter monitor     PONV (postoperative nausea and vomiting)      Sleep apnea     Does not use a CPAP     Past Surgical History:   Procedure Laterality Date     HC TOOTH EXTRACTION W/FORCEP  1976    wisdom tooth x 4     KNEE SURGERY Right 2005     Current Outpatient Medications   Medication Sig Dispense Refill     Continuous Blood Gluc  "Sensor (FREESTYLE CHAYA 14 DAY SENSOR) MISC 1 Device every 14 days 6 each 3     empagliflozin (JARDIANCE) 10 MG TABS tablet Take 1 tablet (10 mg) by mouth every morning 90 tablet 1     metFORMIN (GLUCOPHAGE) 500 MG tablet Take 1-2 tablets (500-1,000 mg) by mouth 2 times daily (with meals) (Patient taking differently: Take 1,000 mg by mouth 2 times daily (with meals) ) 360 tablet 1     multivitamin w/minerals (MULTI-VITAMIN) tablet Take 1 tablet by mouth daily       rosuvastatin (CRESTOR) 5 MG tablet Take 1 tablet (5 mg) by mouth daily 90 tablet 3     vitamin D3 (CHOLECALCIFEROL) 50 mcg (2000 units) tablet Take 1 tablet by mouth daily       acetaminophen (TYLENOL) 650 MG CR tablet Take 1 tablet (650 mg) by mouth every 8 hours as needed for mild pain or fever 100 tablet 2     hydrOXYzine (VISTARIL) 25 MG capsule Take 1-2 capsules (25-50 mg) by mouth nightly as needed for other (insomnia) 60 capsule 3       No Known Allergies     Social History     Tobacco Use     Smoking status: Former Smoker     Packs/day: 0.20     Quit date:      Years since quittin.2     Smokeless tobacco: Former User     Quit date:      Tobacco comment: 15 cig per week   Substance Use Topics     Alcohol use: Yes     Comment: 2 / beers qd or 2-3 glsses of wine       History   Drug Use No         Objective     /80   Pulse 105   Temp 97.1  F (36.2  C) (Tympanic)   Resp 18   Ht 1.651 m (5' 5\")   Wt 84.4 kg (186 lb)   SpO2 98%   BMI 30.95 kg/m      Physical Exam    GENERAL APPEARANCE: healthy, alert and no distress     EYES: EOMI,  PERRL     HENT: ear canals and TM's normal and nose and mouth without ulcers or lesions     NECK: no adenopathy, no asymmetry, masses, or scars and thyroid normal to palpation     RESP: lungs clear to auscultation - no rales, rhonchi or wheezes     CV: regular rates and rhythm, normal S1 S2, no S3 or S4 and no murmur, click or rub     ABDOMEN:  soft, nontender, no HSM or masses and bowel sounds " normal     MS: extremities normal- no gross deformities noted, no evidence of inflammation in joints, FROM in all extremities.     SKIN: no suspicious lesions or rashes     NEURO: Normal strength and tone, sensory exam grossly normal, mentation intact and speech normal     PSYCH: mentation appears normal. and affect normal/bright     LYMPHATICS: No cervical adenopathy    Recent Labs   Lab Test 10/15/20  1628 07/31/20  1615   HGB 15.3  --      --    NA  --  132*   POTASSIUM  --  4.0   CR  --  1.23   A1C 6.4* 11.0*        Diagnostics:  Labs pending at this time.  Results will be reviewed when available.   No EKG required, no history of coronary heart disease, significant arrhythmia, peripheral arterial disease or other structural heart disease.    Revised Cardiac Risk Index (RCRI):  The patient has the following serious cardiovascular risks for perioperative complications:   - No serious cardiac risks = 0 points     RCRI Interpretation: 0 points: Class I (very low risk - 0.4% complication rate)       Signed Electronically by: Alan Mckeon MD  Copy of this evaluation report is provided to requesting physician.

## 2021-03-15 NOTE — PATIENT INSTRUCTIONS

## 2021-03-15 NOTE — PROGRESS NOTES
[address] [address]  Phone: [phone]  Primary Provider: Perlita Walker  Pre-op Performing Provider: PERLITA WALKER    PREOPERATIVE EVALUATION:  Today's date: 3/15/2021    Meño Sánchez is a 60 year old male who presents for a preoperative evaluation.    Surgical Information:  Surgery/Procedure: Left total knee arthroplasty  Surgery Location: [facility]  Surgeon: Dr Washburn  Surgery Date: 03/23/2021  Time of Surgery: 10:30 AM  Where patient plans to recover: At home with family  Fax number for surgical facility: Note does not need to be faxed, will be available electronically in Epic.    Type of Anesthesia Anticipated: Choice    Assessment & Plan     The proposed surgical procedure is considered INTERMEDIATE risk.    Preop general physical exam      Arthritis of knees      Obstructive sleep apnea syndrome  Mild - no cpap    Type 2 diabetes mellitus without complication, without long-term current use of insulin (H) / Diabetic polyneuropathy associated with type 2 diabetes mellitus (H)  Controlled - continue medication.   - Hemoglobin A1c= 6.2           Risks and Recommendations:  The patient has the following additional risks and recommendations for perioperative complications:  Diabetes:  - Patient is not on insulin therapy: regular NPO guidelines can be followed.     Medication Instructions:  Patient is to take all scheduled medications on the day of surgery EXCEPT for modifications listed below:   - metformin: HOLD day of surgery.   - SGLT2 Inhibitor (canagliflozin, dapagliflozin, or empagliflozin): HOLD 3 days before surgery.     RECOMMENDATION:  APPROVAL GIVEN to proceed with proposed procedure, without further diagnostic evaluation.        Aureliano Walker MD     [address] [address]  Office: [phone]  [email or web address]         Subjective     HPI related  to upcoming procedure: left knee pain -       Preop Questions 3/15/2021   1. Have you ever had a heart attack or stroke? No   2. Have you ever had surgery on your heart or blood vessels, such as a stent placement, a coronary artery bypass, or surgery on an artery in your head, neck, heart, or legs? No   3. Do you have chest pain with activity? No   4. Do you have a history of  heart failure? No   5. Do you currently have a cold, bronchitis or symptoms of other infection? No   6. Do you have a cough, shortness of breath, or wheezing? No   7. Do you or anyone in your family have previous history of blood clots? No   8. Do you or does anyone in your family have a serious bleeding problem such as prolonged bleeding following surgeries or cuts? No   9. Have you ever had problems with anemia or been told to take iron pills? No   10. Have you had any abnormal blood loss such as black, tarry or bloody stools? No   11. Have you ever had a blood transfusion? No   12. Are you willing to have a blood transfusion if it is medically needed before, during, or after your surgery? Yes   13. Have you or any of your relatives ever had problems with anesthesia? No   14. Do you have sleep apnea, excessive snoring or daytime drowsiness? UNKNOWN - snores - had sleep study and borderline abnormal but never followed up.  Tired often,  Has been told he snores.    15. Do you have any artifical heart valves or other implanted medical devices like a pacemaker, defibrillator, or continuous glucose monitor? No   16. Do you have artificial joints? No   17. Are you allergic to latex? No     Health Care Directive:  Patient has a Health Care Directive on file      Preoperative Review of :   reviewed - no record of controlled substances prescribed.     Status of Chronic Conditions:  DIABETES - Patient has a  history of DiabetesType Type II . Patient is being treated with diet, oral agents and exercise and denies significant side effects. Control  has been good. Complicating factors include but are not limited to: hypertension and hyperlipidemia.       Review of Systems  CONSTITUTIONAL: NEGATIVE for fever, chills, change in weight  ENT/MOUTH: NEGATIVE for ear, mouth and throat problems  RESP: NEGATIVE for significant cough or SOB  CV: NEGATIVE for chest pain, palpitations or peripheral edema    Patient Active Problem List    Diagnosis Date Noted     Type 2 diabetes mellitus without complication, without long-term current use of insulin (H) 07/31/2020     Priority: High     Diabetic polyneuropathy associated with type 2 diabetes mellitus (H) 07/31/2020     Priority: High     Hyperlipidemia LDL goal <70 10/20/2016     Priority: High     Primary osteoarthritis of left knee 02/16/2021     Priority: Medium     Added automatically from request for surgery [id number]       Chronic instability of knee 10/28/2016     Priority: Medium     Chronic instability of knee, right       Obstructive sleep apnea syndrome 10/21/2015     Priority: Medium     Post-traumatic osteoarthritis of right knee 06/06/2012     Priority: Medium     Vitamin D deficiency 09/01/2011     Priority: Medium      Past Medical History:   Diagnosis Date     Diabetes (H)     type 2     Family history of early CAD 8/30/2011     Generalized anxiety disorder     occasional anxiety -usually when tired     H/O knee surgery 08/03/2012    right knee arthroscopic partial medial meniscectomy     History of gout 10/21/2015     Irregular heart beat      Osteoarthritis     knee     Other and unspecified derangement of medial meniscus 6/6/2012     Palpitations 1990s    negative w/u incld holter monitor     PONV (postoperative nausea and vomiting)      Sleep apnea     Does not use a CPAP     Past Surgical History:   Procedure Laterality Date     HC TOOTH EXTRACTION W/FORCEP  1976    wisdom tooth x 4     KNEE SURGERY Right 2005     Current Outpatient Medications   Medication Sig Dispense Refill     Continuous Blood Gluc  "Sensor (FREESTYLE CHAYA 14 DAY SENSOR) MISC 1 Device every 14 days 6 each 3     empagliflozin (JARDIANCE) 10 MG TABS tablet Take 1 tablet (10 mg) by mouth every morning 90 tablet 1     metFORMIN (GLUCOPHAGE) 500 MG tablet Take 1-2 tablets (500-1,000 mg) by mouth 2 times daily (with meals) (Patient taking differently: Take 1,000 mg by mouth 2 times daily (with meals) ) 360 tablet 1     multivitamin w/minerals (MULTI-VITAMIN) tablet Take 1 tablet by mouth daily       rosuvastatin (CRESTOR) 5 MG tablet Take 1 tablet (5 mg) by mouth daily 90 tablet 3     vitamin D3 (CHOLECALCIFEROL) 50 mcg (2000 units) tablet Take 1 tablet by mouth daily       acetaminophen (TYLENOL) 650 MG CR tablet Take 1 tablet (650 mg) by mouth every 8 hours as needed for mild pain or fever 100 tablet 2     hydrOXYzine (VISTARIL) 25 MG capsule Take 1-2 capsules (25-50 mg) by mouth nightly as needed for other (insomnia) 60 capsule 3       No Known Allergies     Social History     Tobacco Use     Smoking status: Former Smoker     Packs/day: 0.20     Quit date:      Years since quittin.2     Smokeless tobacco: Former User     Quit date:      Tobacco comment: 15 cig per week   Substance Use Topics     Alcohol use: Yes     Comment: 2 / beers qd or 2-3 glsses of wine       History   Drug Use No         Objective     /80   Pulse 105   Temp 97.1  F (36.2  C) (Tympanic)   Resp 18   Ht 1.651 m (5' 5\")   Wt 84.4 kg (186 lb)   SpO2 98%   BMI 30.95 kg/m      Physical Exam    GENERAL APPEARANCE: healthy, alert and no distress     EYES: EOMI,  PERRL     HENT: ear canals and TM's normal and nose and mouth without ulcers or lesions     NECK: no adenopathy, no asymmetry, masses, or scars and thyroid normal to palpation     RESP: lungs clear to auscultation - no rales, rhonchi or wheezes     CV: regular rates and rhythm, normal S1 S2, no S3 or S4 and no murmur, click or rub     ABDOMEN:  soft, nontender, no HSM or masses and bowel sounds " normal     MS: extremities normal- no gross deformities noted, no evidence of inflammation in joints, FROM in all extremities.     SKIN: no suspicious lesions or rashes     NEURO: Normal strength and tone, sensory exam grossly normal, mentation intact and speech normal     PSYCH: mentation appears normal. and affect normal/bright     LYMPHATICS: No cervical adenopathy    Recent Labs   Lab Test 10/15/20  1628 07/31/20  1615   HGB 15.3  --      --    NA  --  132*   POTASSIUM  --  4.0   CR  --  1.23   A1C 6.4* 11.0*        Diagnostics:  Labs pending at this time.  Results will be reviewed when available.   No EKG required, no history of coronary heart disease, significant arrhythmia, peripheral arterial disease or other structural heart disease.    Revised Cardiac Risk Index (RCRI):  The patient has the following serious cardiovascular risks for perioperative complications:   - No serious cardiac risks = 0 points     RCRI Interpretation: 0 points: Class I (very low risk - 0.4% complication rate)       Signed Electronically by: Alan Mckeon MD  Copy of this evaluation report is provided to requesting physician.

## 2021-03-19 DIAGNOSIS — Z11.59 ENCOUNTER FOR SCREENING FOR OTHER VIRAL DISEASES: ICD-10-CM

## 2021-03-19 LAB
SARS-COV-2 RNA RESP QL NAA+PROBE: NORMAL
SPECIMEN SOURCE: NORMAL

## 2021-03-19 PROCEDURE — U0005 INFEC AGEN DETEC AMPLI PROBE: HCPCS | Performed by: ORTHOPAEDIC SURGERY

## 2021-03-19 PROCEDURE — U0003 INFECTIOUS AGENT DETECTION BY NUCLEIC ACID (DNA OR RNA); SEVERE ACUTE RESPIRATORY SYNDROME CORONAVIRUS 2 (SARS-COV-2) (CORONAVIRUS DISEASE [COVID-19]), AMPLIFIED PROBE TECHNIQUE, MAKING USE OF HIGH THROUGHPUT TECHNOLOGIES AS DESCRIBED BY CMS-2020-01-R: HCPCS | Performed by: ORTHOPAEDIC SURGERY

## 2021-03-20 LAB
LABORATORY COMMENT REPORT: NORMAL
SARS-COV-2 RNA RESP QL NAA+PROBE: NEGATIVE
SPECIMEN SOURCE: NORMAL

## 2021-03-23 ENCOUNTER — ANESTHESIA (OUTPATIENT)
Dept: SURGERY | Facility: CLINIC | Age: 61
End: 2021-03-23
Payer: COMMERCIAL

## 2021-03-23 ENCOUNTER — HOSPITAL ENCOUNTER (OUTPATIENT)
Facility: CLINIC | Age: 61
Discharge: HOME | End: 2021-03-24
Attending: ORTHOPAEDIC SURGERY | Admitting: ORTHOPAEDIC SURGERY
Payer: COMMERCIAL

## 2021-03-23 ENCOUNTER — ANESTHESIA EVENT (OUTPATIENT)
Dept: SURGERY | Facility: CLINIC | Age: 61
End: 2021-03-23
Payer: COMMERCIAL

## 2021-03-23 ENCOUNTER — APPOINTMENT (OUTPATIENT)
Dept: PHYSICAL THERAPY | Facility: CLINIC | Age: 61
End: 2021-03-23
Attending: ORTHOPAEDIC SURGERY
Payer: COMMERCIAL

## 2021-03-23 DIAGNOSIS — Z96.652 S/P TKR (TOTAL KNEE REPLACEMENT) USING CEMENT, LEFT: ICD-10-CM

## 2021-03-23 DIAGNOSIS — M17.12 PRIMARY OSTEOARTHRITIS OF LEFT KNEE: ICD-10-CM

## 2021-03-23 DIAGNOSIS — Z78.9 DEEP VEIN THROMBOSIS (DVT) PROPHYLAXIS PRESCRIBED AT DISCHARGE: Primary | ICD-10-CM

## 2021-03-23 DIAGNOSIS — K59.03 DRUG-INDUCED CONSTIPATION: ICD-10-CM

## 2021-03-23 LAB
ABO + RH BLD: NORMAL
ABO + RH BLD: NORMAL
BLD GP AB SCN SERPL QL: NORMAL
BLOOD BANK CMNT PATIENT-IMP: NORMAL
CREAT SERPL-MCNC: 0.78 MG/DL (ref 0.66–1.25)
GFR SERPL CREATININE-BSD FRML MDRD: >90 ML/MIN/{1.73_M2}
GLUCOSE BLDC GLUCOMTR-MCNC: 152 MG/DL (ref 70–99)
GLUCOSE BLDC GLUCOMTR-MCNC: 192 MG/DL (ref 70–99)
GLUCOSE BLDC GLUCOMTR-MCNC: 219 MG/DL (ref 70–99)
GLUCOSE BLDC GLUCOMTR-MCNC: 232 MG/DL (ref 70–99)
GLUCOSE BLDC GLUCOMTR-MCNC: 271 MG/DL (ref 70–99)
HGB BLD-MCNC: 14.2 G/DL (ref 13.3–17.7)
INTERPRETATION ECG - MUSE: NORMAL
POTASSIUM SERPL-SCNC: 3.9 MMOL/L (ref 3.4–5.3)
SPECIMEN EXP DATE BLD: NORMAL

## 2021-03-23 PROCEDURE — 250N000011 HC RX IP 250 OP 636: Performed by: ORTHOPAEDIC SURGERY

## 2021-03-23 PROCEDURE — 250N000011 HC RX IP 250 OP 636

## 2021-03-23 PROCEDURE — 86850 RBC ANTIBODY SCREEN: CPT | Performed by: ANESTHESIOLOGY

## 2021-03-23 PROCEDURE — 93010 ELECTROCARDIOGRAM REPORT: CPT | Performed by: INTERNAL MEDICINE

## 2021-03-23 PROCEDURE — 96372 THER/PROPH/DIAG INJ SC/IM: CPT | Performed by: ORTHOPAEDIC SURGERY

## 2021-03-23 PROCEDURE — 271N000001 HC OR GENERAL SUPPLY NON-STERILE: Performed by: ORTHOPAEDIC SURGERY

## 2021-03-23 PROCEDURE — 250N000013 HC RX MED GY IP 250 OP 250 PS 637: Performed by: ORTHOPAEDIC SURGERY

## 2021-03-23 PROCEDURE — 370N000017 HC ANESTHESIA TECHNICAL FEE, PER MIN: Performed by: ORTHOPAEDIC SURGERY

## 2021-03-23 PROCEDURE — 250N000012 HC RX MED GY IP 250 OP 636 PS 637: Performed by: ORTHOPAEDIC SURGERY

## 2021-03-23 PROCEDURE — 250N000013 HC RX MED GY IP 250 OP 250 PS 637: Performed by: PHYSICIAN ASSISTANT

## 2021-03-23 PROCEDURE — 27447 TOTAL KNEE ARTHROPLASTY: CPT | Mod: AS | Performed by: PHYSICIAN ASSISTANT

## 2021-03-23 PROCEDURE — 82565 ASSAY OF CREATININE: CPT | Performed by: ANESTHESIOLOGY

## 2021-03-23 PROCEDURE — 250N000013 HC RX MED GY IP 250 OP 250 PS 637: Performed by: ANESTHESIOLOGY

## 2021-03-23 PROCEDURE — 999N000141 HC STATISTIC PRE-PROCEDURE NURSING ASSESSMENT: Performed by: ORTHOPAEDIC SURGERY

## 2021-03-23 PROCEDURE — 82962 GLUCOSE BLOOD TEST: CPT

## 2021-03-23 PROCEDURE — 250N000009 HC RX 250

## 2021-03-23 PROCEDURE — 258N000003 HC RX IP 258 OP 636

## 2021-03-23 PROCEDURE — 97161 PT EVAL LOW COMPLEX 20 MIN: CPT | Mod: GP | Performed by: PHYSICAL THERAPIST

## 2021-03-23 PROCEDURE — 250N000011 HC RX IP 250 OP 636: Performed by: ANESTHESIOLOGY

## 2021-03-23 PROCEDURE — 710N000009 HC RECOVERY PHASE 1, LEVEL 1, PER MIN: Performed by: ORTHOPAEDIC SURGERY

## 2021-03-23 PROCEDURE — 97116 GAIT TRAINING THERAPY: CPT | Mod: GP | Performed by: PHYSICAL THERAPIST

## 2021-03-23 PROCEDURE — 272N000001 HC OR GENERAL SUPPLY STERILE: Performed by: ORTHOPAEDIC SURGERY

## 2021-03-23 PROCEDURE — 84132 ASSAY OF SERUM POTASSIUM: CPT | Performed by: ANESTHESIOLOGY

## 2021-03-23 PROCEDURE — 360N000077 HC SURGERY LEVEL 4, PER MIN: Performed by: ORTHOPAEDIC SURGERY

## 2021-03-23 PROCEDURE — 86901 BLOOD TYPING SEROLOGIC RH(D): CPT | Performed by: ANESTHESIOLOGY

## 2021-03-23 PROCEDURE — 27447 TOTAL KNEE ARTHROPLASTY: CPT | Mod: LT | Performed by: ORTHOPAEDIC SURGERY

## 2021-03-23 PROCEDURE — 258N000003 HC RX IP 258 OP 636: Performed by: ORTHOPAEDIC SURGERY

## 2021-03-23 PROCEDURE — 86900 BLOOD TYPING SEROLOGIC ABO: CPT | Performed by: ANESTHESIOLOGY

## 2021-03-23 PROCEDURE — C1776 JOINT DEVICE (IMPLANTABLE): HCPCS | Performed by: ORTHOPAEDIC SURGERY

## 2021-03-23 PROCEDURE — 250N000011 HC RX IP 250 OP 636: Performed by: PHYSICIAN ASSISTANT

## 2021-03-23 PROCEDURE — 258N000001 HC RX 258: Performed by: ORTHOPAEDIC SURGERY

## 2021-03-23 PROCEDURE — 36415 COLL VENOUS BLD VENIPUNCTURE: CPT | Performed by: ANESTHESIOLOGY

## 2021-03-23 PROCEDURE — 278N000051 HC OR IMPLANT GENERAL: Performed by: ORTHOPAEDIC SURGERY

## 2021-03-23 PROCEDURE — 85018 HEMOGLOBIN: CPT | Performed by: ANESTHESIOLOGY

## 2021-03-23 PROCEDURE — 258N000003 HC RX IP 258 OP 636: Performed by: ANESTHESIOLOGY

## 2021-03-23 DEVICE — IMPLANTABLE DEVICE: Type: IMPLANTABLE DEVICE | Site: KNEE | Status: FUNCTIONAL

## 2021-03-23 DEVICE — IMP COMP FEMORAL S&N LEGION CR NP NARROW 5 LT 71933642: Type: IMPLANTABLE DEVICE | Site: KNEE | Status: FUNCTIONAL

## 2021-03-23 DEVICE — BONE CEMENT SIMPLEX FULL DOSE 6191-1-001: Type: IMPLANTABLE DEVICE | Site: KNEE | Status: FUNCTIONAL

## 2021-03-23 DEVICE — IMP COMP PATELLA SNR GENESIS II 9X32MM 71420576: Type: IMPLANTABLE DEVICE | Site: KNEE | Status: FUNCTIONAL

## 2021-03-23 RX ORDER — CEFAZOLIN SODIUM 2 G/100ML
2 INJECTION, SOLUTION INTRAVENOUS SEE ADMIN INSTRUCTIONS
Status: DISCONTINUED | OUTPATIENT
Start: 2021-03-23 | End: 2021-03-23 | Stop reason: HOSPADM

## 2021-03-23 RX ORDER — TRANEXAMIC ACID 650 MG/1
1950 TABLET ORAL ONCE
Status: COMPLETED | OUTPATIENT
Start: 2021-03-23 | End: 2021-03-23

## 2021-03-23 RX ORDER — NALOXONE HYDROCHLORIDE 0.4 MG/ML
0.4 INJECTION, SOLUTION INTRAMUSCULAR; INTRAVENOUS; SUBCUTANEOUS
Status: DISCONTINUED | OUTPATIENT
Start: 2021-03-23 | End: 2021-03-23 | Stop reason: HOSPADM

## 2021-03-23 RX ORDER — MEPERIDINE HYDROCHLORIDE 25 MG/ML
12.5 INJECTION INTRAMUSCULAR; INTRAVENOUS; SUBCUTANEOUS
Status: DISCONTINUED | OUTPATIENT
Start: 2021-03-23 | End: 2021-03-23 | Stop reason: HOSPADM

## 2021-03-23 RX ORDER — DOCUSATE SODIUM 100 MG/1
100 CAPSULE, LIQUID FILLED ORAL 2 TIMES DAILY
Status: DISCONTINUED | OUTPATIENT
Start: 2021-03-23 | End: 2021-03-24 | Stop reason: HOSPADM

## 2021-03-23 RX ORDER — HYDROMORPHONE HCL IN WATER/PF 6 MG/30 ML
0.2 PATIENT CONTROLLED ANALGESIA SYRINGE INTRAVENOUS
Status: DISCONTINUED | OUTPATIENT
Start: 2021-03-23 | End: 2021-03-24 | Stop reason: HOSPADM

## 2021-03-23 RX ORDER — HYDROXYZINE HYDROCHLORIDE 25 MG/1
25 TABLET, FILM COATED ORAL EVERY 6 HOURS PRN
Status: DISCONTINUED | OUTPATIENT
Start: 2021-03-23 | End: 2021-03-24 | Stop reason: HOSPADM

## 2021-03-23 RX ORDER — KETAMINE HYDROCHLORIDE 10 MG/ML
INJECTION, SOLUTION INTRAMUSCULAR; INTRAVENOUS PRN
Status: DISCONTINUED | OUTPATIENT
Start: 2021-03-23 | End: 2021-03-23

## 2021-03-23 RX ORDER — ALBUTEROL SULFATE 0.83 MG/ML
2.5 SOLUTION RESPIRATORY (INHALATION) EVERY 4 HOURS PRN
Status: DISCONTINUED | OUTPATIENT
Start: 2021-03-23 | End: 2021-03-23 | Stop reason: HOSPADM

## 2021-03-23 RX ORDER — HYDROXYZINE HYDROCHLORIDE 25 MG/1
25-50 TABLET, FILM COATED ORAL
Status: DISCONTINUED | OUTPATIENT
Start: 2021-03-23 | End: 2021-03-24 | Stop reason: HOSPADM

## 2021-03-23 RX ORDER — FENTANYL CITRATE 50 UG/ML
INJECTION, SOLUTION INTRAMUSCULAR; INTRAVENOUS PRN
Status: DISCONTINUED | OUTPATIENT
Start: 2021-03-23 | End: 2021-03-23

## 2021-03-23 RX ORDER — SODIUM CHLORIDE, SODIUM LACTATE, POTASSIUM CHLORIDE, CALCIUM CHLORIDE 600; 310; 30; 20 MG/100ML; MG/100ML; MG/100ML; MG/100ML
INJECTION, SOLUTION INTRAVENOUS CONTINUOUS
Status: DISCONTINUED | OUTPATIENT
Start: 2021-03-23 | End: 2021-03-23 | Stop reason: HOSPADM

## 2021-03-23 RX ORDER — FENTANYL CITRATE 50 UG/ML
25-50 INJECTION, SOLUTION INTRAMUSCULAR; INTRAVENOUS
Status: DISCONTINUED | OUTPATIENT
Start: 2021-03-23 | End: 2021-03-23 | Stop reason: HOSPADM

## 2021-03-23 RX ORDER — NALOXONE HYDROCHLORIDE 0.4 MG/ML
0.4 INJECTION, SOLUTION INTRAMUSCULAR; INTRAVENOUS; SUBCUTANEOUS
Status: DISCONTINUED | OUTPATIENT
Start: 2021-03-23 | End: 2021-03-24 | Stop reason: HOSPADM

## 2021-03-23 RX ORDER — ONDANSETRON 2 MG/ML
4 INJECTION INTRAMUSCULAR; INTRAVENOUS EVERY 6 HOURS PRN
Status: DISCONTINUED | OUTPATIENT
Start: 2021-03-23 | End: 2021-03-24 | Stop reason: HOSPADM

## 2021-03-23 RX ORDER — BISACODYL 10 MG
10 SUPPOSITORY, RECTAL RECTAL DAILY PRN
Status: DISCONTINUED | OUTPATIENT
Start: 2021-03-23 | End: 2021-03-24 | Stop reason: HOSPADM

## 2021-03-23 RX ORDER — NICOTINE POLACRILEX 4 MG
15-30 LOZENGE BUCCAL
Status: DISCONTINUED | OUTPATIENT
Start: 2021-03-23 | End: 2021-03-24 | Stop reason: HOSPADM

## 2021-03-23 RX ORDER — PROCHLORPERAZINE MALEATE 5 MG
10 TABLET ORAL EVERY 6 HOURS PRN
Status: DISCONTINUED | OUTPATIENT
Start: 2021-03-23 | End: 2021-03-24 | Stop reason: HOSPADM

## 2021-03-23 RX ORDER — GABAPENTIN 100 MG/1
100 CAPSULE ORAL 3 TIMES DAILY
Status: DISCONTINUED | OUTPATIENT
Start: 2021-03-23 | End: 2021-03-24 | Stop reason: HOSPADM

## 2021-03-23 RX ORDER — CEFAZOLIN SODIUM 2 G/100ML
2 INJECTION, SOLUTION INTRAVENOUS
Status: DISCONTINUED | OUTPATIENT
Start: 2021-03-23 | End: 2021-03-23 | Stop reason: HOSPADM

## 2021-03-23 RX ORDER — ONDANSETRON 4 MG/1
4 TABLET, ORALLY DISINTEGRATING ORAL EVERY 6 HOURS PRN
Status: DISCONTINUED | OUTPATIENT
Start: 2021-03-23 | End: 2021-03-24 | Stop reason: HOSPADM

## 2021-03-23 RX ORDER — LIDOCAINE HYDROCHLORIDE 10 MG/ML
INJECTION, SOLUTION INFILTRATION; PERINEURAL PRN
Status: DISCONTINUED | OUTPATIENT
Start: 2021-03-23 | End: 2021-03-23

## 2021-03-23 RX ORDER — ACETAMINOPHEN 325 MG/1
975 TABLET ORAL EVERY 8 HOURS
Status: DISCONTINUED | OUTPATIENT
Start: 2021-03-23 | End: 2021-03-24 | Stop reason: HOSPADM

## 2021-03-23 RX ORDER — AMOXICILLIN 250 MG
1 CAPSULE ORAL 2 TIMES DAILY
Status: DISCONTINUED | OUTPATIENT
Start: 2021-03-23 | End: 2021-03-24 | Stop reason: HOSPADM

## 2021-03-23 RX ORDER — ACETAMINOPHEN 325 MG/1
975 TABLET ORAL ONCE
Status: DISCONTINUED | OUTPATIENT
Start: 2021-03-23 | End: 2021-03-23 | Stop reason: HOSPADM

## 2021-03-23 RX ORDER — ACETAMINOPHEN 325 MG/1
975 TABLET ORAL ONCE
Status: COMPLETED | OUTPATIENT
Start: 2021-03-23 | End: 2021-03-23

## 2021-03-23 RX ORDER — ACETAMINOPHEN 325 MG/1
650 TABLET ORAL EVERY 4 HOURS PRN
Status: DISCONTINUED | OUTPATIENT
Start: 2021-03-26 | End: 2021-03-24 | Stop reason: HOSPADM

## 2021-03-23 RX ORDER — HYDROMORPHONE HYDROCHLORIDE 1 MG/ML
0.4 INJECTION, SOLUTION INTRAMUSCULAR; INTRAVENOUS; SUBCUTANEOUS
Status: DISCONTINUED | OUTPATIENT
Start: 2021-03-23 | End: 2021-03-24 | Stop reason: HOSPADM

## 2021-03-23 RX ORDER — NALOXONE HYDROCHLORIDE 0.4 MG/ML
0.2 INJECTION, SOLUTION INTRAMUSCULAR; INTRAVENOUS; SUBCUTANEOUS
Status: DISCONTINUED | OUTPATIENT
Start: 2021-03-23 | End: 2021-03-23 | Stop reason: HOSPADM

## 2021-03-23 RX ORDER — BUPIVACAINE HYDROCHLORIDE 7.5 MG/ML
INJECTION, SOLUTION INTRASPINAL PRN
Status: DISCONTINUED | OUTPATIENT
Start: 2021-03-23 | End: 2021-03-23

## 2021-03-23 RX ORDER — ASPIRIN 81 MG/1
81 TABLET ORAL 2 TIMES DAILY
Status: DISCONTINUED | OUTPATIENT
Start: 2021-03-23 | End: 2021-03-24 | Stop reason: HOSPADM

## 2021-03-23 RX ORDER — DEXAMETHASONE SODIUM PHOSPHATE 4 MG/ML
INJECTION, SOLUTION INTRA-ARTICULAR; INTRALESIONAL; INTRAMUSCULAR; INTRAVENOUS; SOFT TISSUE PRN
Status: DISCONTINUED | OUTPATIENT
Start: 2021-03-23 | End: 2021-03-23

## 2021-03-23 RX ORDER — NALOXONE HYDROCHLORIDE 0.4 MG/ML
0.2 INJECTION, SOLUTION INTRAMUSCULAR; INTRAVENOUS; SUBCUTANEOUS
Status: DISCONTINUED | OUTPATIENT
Start: 2021-03-23 | End: 2021-03-24 | Stop reason: HOSPADM

## 2021-03-23 RX ORDER — ONDANSETRON 4 MG/1
4 TABLET, ORALLY DISINTEGRATING ORAL EVERY 30 MIN PRN
Status: DISCONTINUED | OUTPATIENT
Start: 2021-03-23 | End: 2021-03-23 | Stop reason: HOSPADM

## 2021-03-23 RX ORDER — METOPROLOL TARTRATE 1 MG/ML
1-2 INJECTION, SOLUTION INTRAVENOUS EVERY 5 MIN PRN
Status: DISCONTINUED | OUTPATIENT
Start: 2021-03-23 | End: 2021-03-23 | Stop reason: HOSPADM

## 2021-03-23 RX ORDER — OXYCODONE HYDROCHLORIDE 5 MG/1
5 TABLET ORAL EVERY 4 HOURS PRN
Status: DISCONTINUED | OUTPATIENT
Start: 2021-03-23 | End: 2021-03-24 | Stop reason: HOSPADM

## 2021-03-23 RX ORDER — GLYCOPYRROLATE 0.2 MG/ML
INJECTION, SOLUTION INTRAMUSCULAR; INTRAVENOUS PRN
Status: DISCONTINUED | OUTPATIENT
Start: 2021-03-23 | End: 2021-03-23

## 2021-03-23 RX ORDER — HYDROMORPHONE HYDROCHLORIDE 1 MG/ML
.3-.5 INJECTION, SOLUTION INTRAMUSCULAR; INTRAVENOUS; SUBCUTANEOUS EVERY 10 MIN PRN
Status: DISCONTINUED | OUTPATIENT
Start: 2021-03-23 | End: 2021-03-23 | Stop reason: HOSPADM

## 2021-03-23 RX ORDER — OXYCODONE HYDROCHLORIDE 5 MG/1
10 TABLET ORAL EVERY 4 HOURS PRN
Status: DISCONTINUED | OUTPATIENT
Start: 2021-03-23 | End: 2021-03-24 | Stop reason: HOSPADM

## 2021-03-23 RX ORDER — ONDANSETRON 2 MG/ML
4 INJECTION INTRAMUSCULAR; INTRAVENOUS EVERY 30 MIN PRN
Status: DISCONTINUED | OUTPATIENT
Start: 2021-03-23 | End: 2021-03-23 | Stop reason: HOSPADM

## 2021-03-23 RX ORDER — LIDOCAINE 40 MG/G
CREAM TOPICAL
Status: DISCONTINUED | OUTPATIENT
Start: 2021-03-23 | End: 2021-03-23 | Stop reason: HOSPADM

## 2021-03-23 RX ORDER — OXYCODONE HCL 10 MG/1
10 TABLET, FILM COATED, EXTENDED RELEASE ORAL ONCE
Status: COMPLETED | OUTPATIENT
Start: 2021-03-23 | End: 2021-03-23

## 2021-03-23 RX ORDER — ONDANSETRON 2 MG/ML
INJECTION INTRAMUSCULAR; INTRAVENOUS PRN
Status: DISCONTINUED | OUTPATIENT
Start: 2021-03-23 | End: 2021-03-23

## 2021-03-23 RX ORDER — DEXTROSE MONOHYDRATE 25 G/50ML
25-50 INJECTION, SOLUTION INTRAVENOUS
Status: DISCONTINUED | OUTPATIENT
Start: 2021-03-23 | End: 2021-03-24 | Stop reason: HOSPADM

## 2021-03-23 RX ORDER — POLYETHYLENE GLYCOL 3350 17 G/17G
17 POWDER, FOR SOLUTION ORAL DAILY
Status: DISCONTINUED | OUTPATIENT
Start: 2021-03-24 | End: 2021-03-24 | Stop reason: HOSPADM

## 2021-03-23 RX ORDER — SODIUM CHLORIDE, SODIUM LACTATE, POTASSIUM CHLORIDE, CALCIUM CHLORIDE 600; 310; 30; 20 MG/100ML; MG/100ML; MG/100ML; MG/100ML
INJECTION, SOLUTION INTRAVENOUS CONTINUOUS
Status: DISCONTINUED | OUTPATIENT
Start: 2021-03-23 | End: 2021-03-24 | Stop reason: HOSPADM

## 2021-03-23 RX ORDER — CEFAZOLIN SODIUM 2 G/100ML
2 INJECTION, SOLUTION INTRAVENOUS EVERY 8 HOURS
Status: COMPLETED | OUTPATIENT
Start: 2021-03-23 | End: 2021-03-24

## 2021-03-23 RX ORDER — PROPOFOL 10 MG/ML
INJECTION, EMULSION INTRAVENOUS CONTINUOUS PRN
Status: DISCONTINUED | OUTPATIENT
Start: 2021-03-23 | End: 2021-03-23

## 2021-03-23 RX ORDER — LIDOCAINE 40 MG/G
CREAM TOPICAL
Status: DISCONTINUED | OUTPATIENT
Start: 2021-03-23 | End: 2021-03-24 | Stop reason: HOSPADM

## 2021-03-23 RX ADMIN — SODIUM CHLORIDE 2 UNITS: 9 INJECTION, SOLUTION INTRAVENOUS at 12:58

## 2021-03-23 RX ADMIN — DEXAMETHASONE SODIUM PHOSPHATE 4 MG: 4 INJECTION, SOLUTION INTRA-ARTICULAR; INTRALESIONAL; INTRAMUSCULAR; INTRAVENOUS; SOFT TISSUE at 10:05

## 2021-03-23 RX ADMIN — KETAMINE HYDROCHLORIDE 10 MG: 10 INJECTION, SOLUTION INTRAMUSCULAR; INTRAVENOUS at 10:10

## 2021-03-23 RX ADMIN — FENTANYL CITRATE 50 MCG: 50 INJECTION, SOLUTION INTRAMUSCULAR; INTRAVENOUS at 10:03

## 2021-03-23 RX ADMIN — KETAMINE HYDROCHLORIDE 10 MG: 10 INJECTION, SOLUTION INTRAMUSCULAR; INTRAVENOUS at 10:18

## 2021-03-23 RX ADMIN — INSULIN ASPART 3 UNITS: 100 INJECTION, SOLUTION INTRAVENOUS; SUBCUTANEOUS at 17:13

## 2021-03-23 RX ADMIN — MIDAZOLAM 2 MG: 1 INJECTION INTRAMUSCULAR; INTRAVENOUS at 09:57

## 2021-03-23 RX ADMIN — PHENYLEPHRINE HYDROCHLORIDE 100 MCG: 10 INJECTION INTRAVENOUS at 10:31

## 2021-03-23 RX ADMIN — PHENYLEPHRINE HYDROCHLORIDE 50 MCG: 10 INJECTION INTRAVENOUS at 10:16

## 2021-03-23 RX ADMIN — ACETAMINOPHEN 975 MG: 325 TABLET, FILM COATED ORAL at 08:56

## 2021-03-23 RX ADMIN — ASPIRIN 81 MG: 81 TABLET ORAL at 19:48

## 2021-03-23 RX ADMIN — SODIUM CHLORIDE, POTASSIUM CHLORIDE, SODIUM LACTATE AND CALCIUM CHLORIDE: 600; 310; 30; 20 INJECTION, SOLUTION INTRAVENOUS at 09:25

## 2021-03-23 RX ADMIN — PHENYLEPHRINE HYDROCHLORIDE 100 MCG: 10 INJECTION INTRAVENOUS at 11:29

## 2021-03-23 RX ADMIN — CEFAZOLIN SODIUM 2 G: 2 INJECTION, SOLUTION INTRAVENOUS at 17:57

## 2021-03-23 RX ADMIN — ONDANSETRON HYDROCHLORIDE 4 MG: 2 INJECTION, SOLUTION INTRAVENOUS at 10:37

## 2021-03-23 RX ADMIN — METFORMIN HYDROCHLORIDE 1000 MG: 500 TABLET, FILM COATED ORAL at 17:11

## 2021-03-23 RX ADMIN — GLYCOPYRROLATE 0.2 MG: 0.2 INJECTION, SOLUTION INTRAMUSCULAR; INTRAVENOUS at 10:06

## 2021-03-23 RX ADMIN — BUPIVACAINE HYDROCHLORIDE IN DEXTROSE 15 MG: 7.5 INJECTION, SOLUTION SUBARACHNOID at 10:07

## 2021-03-23 RX ADMIN — PROPOFOL 110 MCG/KG/MIN: 10 INJECTION, EMULSION INTRAVENOUS at 10:03

## 2021-03-23 RX ADMIN — PHENYLEPHRINE HYDROCHLORIDE 150 MCG: 10 INJECTION INTRAVENOUS at 10:40

## 2021-03-23 RX ADMIN — TRANEXAMIC ACID 1950 MG: 650 TABLET ORAL at 08:55

## 2021-03-23 RX ADMIN — PHENYLEPHRINE HYDROCHLORIDE 100 MCG: 10 INJECTION INTRAVENOUS at 11:27

## 2021-03-23 RX ADMIN — PHENYLEPHRINE HYDROCHLORIDE 50 MCG: 10 INJECTION INTRAVENOUS at 10:23

## 2021-03-23 RX ADMIN — CEFAZOLIN SODIUM 2 G: 2 INJECTION, SOLUTION INTRAVENOUS at 09:58

## 2021-03-23 RX ADMIN — GABAPENTIN 100 MG: 100 CAPSULE ORAL at 19:48

## 2021-03-23 RX ADMIN — OXYCODONE HYDROCHLORIDE 10 MG: 10 TABLET, FILM COATED, EXTENDED RELEASE ORAL at 08:56

## 2021-03-23 RX ADMIN — GABAPENTIN 100 MG: 100 CAPSULE ORAL at 14:53

## 2021-03-23 RX ADMIN — ACETAMINOPHEN 975 MG: 325 TABLET, FILM COATED ORAL at 17:11

## 2021-03-23 RX ADMIN — LIDOCAINE HYDROCHLORIDE 40 MG: 10 INJECTION, SOLUTION INFILTRATION; PERINEURAL at 10:03

## 2021-03-23 RX ADMIN — PHENYLEPHRINE HYDROCHLORIDE 50 MCG: 10 INJECTION INTRAVENOUS at 10:49

## 2021-03-23 RX ADMIN — SODIUM CHLORIDE, POTASSIUM CHLORIDE, SODIUM LACTATE AND CALCIUM CHLORIDE: 600; 310; 30; 20 INJECTION, SOLUTION INTRAVENOUS at 11:10

## 2021-03-23 ASSESSMENT — MIFFLIN-ST. JEOR: SCORE: 1579.19

## 2021-03-23 NOTE — OP NOTE
"March 23, 2021    Pre Operative Diagnosis: Left Knee DJD  Post operative Diagnosis: Left Knee DJD  Title of the Procedure: Left Total Knee Arthroplasty ( Smith and Nephew #5 narrow PCR femur; #3 tibia; 32 mm patella and 9 mm tibial insert  )  Anesthesia: spinal  Surgeon: Shantanu Washburn M.D.  Assistant:EVITA Anaya    Assistance from the PA was necessary for this case due to the complexity of the procedure. PA helped with satisfactory exposure of the bones, protecting vital neurovascular and ligamentous structures. He also helped with maintaining the instruments for bone osteotomy and subsequent implantation of the components. He also performed wound closure and placement of postoperative dressing.    Drain: Hemovac    Indication of the Procedure:  This patient is a 60 y.o. male who has had chronic knee pain with progressively worsening due to advanced osteoarthritis. All reasonable and appropriate non-operative treatments have been tried. The knee pain is severe to the point of affecting this patient's day-to-day activities and There has been disturbances of sleeping. With understanding of the available options including further observation, nature of the surgery and potential complications of the surgery, total knee arthroplasty is chosen to be carried out today. As far as potential complications are concerned, emphasis has been placed on infection, deep vein thrombosis, arthrofibrosis, los-prosthetic fractures, loosening of the components, unexpected anesthetic complications as well as persisting pain that cannot be explained.    Description of the procedure:  After satisfactory spinal anesthesia was administered, the correct lower extremity was then prepped and draped in a routine sterile fashion in a supine position. The \"time out\" was then carried out per protocol. At this time administration of preoperative prophylactic antibiotic was confirmed.    Following exsanguination, the  tourniquet was then inflated to " 300 mm Hg. A midline longitudinal incision was made from just above the patella  down to the tibial tubercle. A sharp dissection was carried through the subcutaneous tissue and subsequently,  a medial parapatellar arthrotomy was carried out entering the knee joint. The patella was then displaced laterally and knee was flexed. All osteophytes were removed from the femur,  tibia and patella.    After placement of the intramedullary sylvia into the femur distal femoral cut was made using 5  valgus angle. After measurement of the size, the rest of the femoral cuts were made;anterior and posterior as well as chamfer.The trial femoral component was then applied and noted that fitting was satisfactory.  Using the extra medullary alignment guide, a perpendicular osteotomy was made on the proximal tibia. Using the appropriately sized tibial trial components, alignment was assessed using the alignment sylvia. It was felt that a normal medical axis from the hip joint to the ankle joint was reestablished when all trial components were placed. Valgus and varus stress were applied and soft tissue tension was evaluated at this time. It was also confirmed there is a full extension and full flexion with gravity alone. The space for the tibial keel was then punched into the proximal tibia.    Subsequently, the patella was osteotomized after measurement of the thickness. The size was measured and the pegs were drilled. Patellofemoral tracking was then evaluated and noted to be satisfactory.    All osteotomized bone surfaces were thoroughly irrigated with the jet lavage system. The bone cement was mixed and components were placed while the bone cement was doughy.Excess cement was removed from components. When the bonecement became completely hard, another trial reduction was carried out to determine the thickness of the tibial polyethylene component. The final polyethylene tibial component was then inserted.  With further irrigation of the  knee joint a medium Hemovac drain was placed. The wound was closed In layers in a routine fashion.The skin layer was closed with staples.A routine compression soft dressing was applied and the knee immobilizer was applied. Tourniquet was deflated at the end of the procedure.  The needle and sponge counts were correct at the end of the case. Blood loss was minimum. No intraoperative complications were identified at the time of finishing the operation.    Shantanu Washburn M.D.

## 2021-03-23 NOTE — ANESTHESIA POSTPROCEDURE EVALUATION
Patient: Meño Sánchez    Procedure(s):  Left total knee arthroplasty    Diagnosis:Primary osteoarthritis of left knee [M17.12]  Diagnosis Additional Information: No value filed.    Anesthesia Type:  Spinal    Note:     Postop Pain Control: Uneventful            Sign Out: Well controlled pain   PONV: No   Neuro/Psych: Uneventful            Sign Out: Acceptable/Baseline neuro status   Airway/Respiratory: Uneventful            Sign Out: Acceptable/Baseline resp. status   CV/Hemodynamics: Uneventful            Sign Out: Acceptable CV status   Other NRE: NONE   DID A NON-ROUTINE EVENT OCCUR? No         Last vitals:  Vitals:    03/23/21 1220 03/23/21 1225 03/23/21 1230   BP: 95/55 96/59    Pulse: 75 75 74   Resp: 13 13 11   Temp:   97.8  F (36.6  C)   SpO2: 94% 95% 96%       Last vitals prior to Anesthesia Care Transfer:  CRNA VITALS  3/23/2021 1125 - 3/23/2021 1225      3/23/2021             NIBP:  101/78    Pulse:  87    NIBP Mean:  85    SpO2:  97 %          Electronically Signed By: Andrew Terrell DO  March 23, 2021  12:59 PM

## 2021-03-23 NOTE — PROVIDER NOTIFICATION
BP remains 90s/60s.  Pt stable.  MDA paged to verify that pt is ok to discharge from PACU.  2 units of insulin given IV at 1300 for blood sugar of 219; wondering if recheck is needed.  Waiting for response.

## 2021-03-23 NOTE — ANESTHESIA PREPROCEDURE EVALUATION
Anesthesia Pre-Procedure Evaluation    Patient: Meño Sánchez   MRN: [id number] : 1960        Preoperative Diagnosis: Primary osteoarthritis of left knee [M17.12]   Procedure : Procedure(s):  Left total knee arthroplasty     Past Medical History:   Diagnosis Date     Diabetes (H)     type 2     Family history of early CAD 2011     Generalized anxiety disorder     occasional anxiety -usually when tired     H/O knee surgery 2012    right knee arthroscopic partial medial meniscectomy     History of gout 10/21/2015     Irregular heart beat      Osteoarthritis     knee     Other and unspecified derangement of medial meniscus 2012     Palpitations     negative w/u incld holter monitor     PONV (postoperative nausea and vomiting)      Sleep apnea     Does not use a CPAP      Past Surgical History:   Procedure Laterality Date     HC TOOTH EXTRACTION W/FORCEP      wisdom tooth x 4     KNEE SURGERY Right 2005      No Known Allergies   Social History     Tobacco Use     Smoking status: Former Smoker     Packs/day: 0.20     Quit date:      Years since quittin.2     Smokeless tobacco: Former User     Quit date:      Tobacco comment: 15 cig per week   Substance Use Topics     Alcohol use: Yes     Comment: 2 / beers qd or 2-3 glsses of wine      Wt Readings from Last 1 Encounters:   21 85.2 kg (187 lb 14.4 oz)        Anesthesia Evaluation            ROS/MED HX  ENT/Pulmonary:     (+) sleep apnea, mild,     Neurologic:  - neg neurologic ROS     Cardiovascular:     (+) -----Irregular Heartbeat/Palpitations,     METS/Exercise Tolerance:     Hematologic:  - neg hematologic  ROS     Musculoskeletal:   (+) arthritis,     GI/Hepatic:  - neg GI/hepatic ROS     Renal/Genitourinary:  - neg Renal ROS     Endo: Comment: .Body mass index is 31.27 kg/m .   - neg endo ROS   (+) type II DM,     Psychiatric/Substance Use:  - neg psychiatric ROS     Infectious Disease:  - neg infectious disease ROS      Malignancy:  - neg malignancy ROS     Other: Comment: .Lab Test        10/15/20                       1628          WBC          5.1           HGB          15.3          MCV          104*          PLT          219            Lab Test        07/31/20                       1615          NA           132*          POTASSIUM    4.0           CHLORIDE     101           CO2          25            BUN          14            CR           1.23          ANIONGAP     6             TIESHA          8.8           GLC          369*           - neg other ROS          Physical Exam    Airway        Mallampati: II    Neck ROM: full     Respiratory Devices and Support         Dental           Cardiovascular   cardiovascular exam normal       Rhythm and rate: regular     Pulmonary   pulmonary exam normal                OUTSIDE LABS:  CBC:   Lab Results   Component Value Date    WBC 5.1 10/15/2020    HGB 15.3 10/15/2020    HCT 45.6 10/15/2020     10/15/2020     BMP:   Lab Results   Component Value Date     (L) 07/31/2020     03/26/2004    POTASSIUM 4.0 07/31/2020    POTASSIUM 3.8 03/26/2004    CHLORIDE 101 07/31/2020    CHLORIDE 105 03/26/2004    CO2 25 07/31/2020    CO2 27 03/26/2004    BUN 14 07/31/2020    BUN 11 03/26/2004    CR 1.23 07/31/2020    CR 1.10 03/26/2004     (H) 07/31/2020     03/26/2004     COAGS: No results found for: PTT, INR, FIBR  POC:   Lab Results   Component Value Date     (H) 03/23/2021     HEPATIC:   Lab Results   Component Value Date    ALBUMIN 3.5 07/31/2020    PROTTOTAL 8.0 07/31/2020    ALT 69 07/31/2020    AST 54 (H) 07/31/2020    ALKPHOS 102 07/31/2020    BILITOTAL 0.7 07/31/2020     OTHER:   Lab Results   Component Value Date    A1C 6.2 (H) 03/15/2021    TIESHA 8.8 07/31/2020    TSH 3.27 07/31/2020       Anesthesia Plan    ASA Status:  2      Anesthesia Type: Spinal.              Consents            Postoperative Care    Pain management: IV analgesics, Multi-modal  analgesia, Oral pain medications.   PONV prophylaxis: Ondansetron (or other 5HT-3), Dexamethasone or Solumedrol     Comments:                Andrew Terrell, DO

## 2021-03-23 NOTE — ANESTHESIA PROCEDURE NOTES
Intrathecal injection Procedure Note  Pre-Procedure   Staff -        Anesthesiologist:  Andrew Terrell DO       Performed By: anesthesiologist       Location: OR       Procedure Start/Stop Times: 3/23/2021 10:03 AM and 3/23/2021 10:08 AM       Pre-Anesthestic Checklist: patient identified, IV checked, risks and benefits discussed, informed consent, monitors and equipment checked and pre-op evaluation  Timeout:       Correct Patient: Yes        Correct Procedure: Yes        Correct Site: Yes        Correct Position: Yes   Procedure Documentation  Procedure: intrathecal injection       Patient Position: sitting       Patient Prep/Sterile Barriers: sterile gloves, mask       Skin prep: Betadine       Insertion Site: L3-4. (midline approach).       Needle Gauge: 25.        Needle Length (Inches): 3.5        Spinal Needle Type: Pencan       Introducer used      # of attempts: 1 and  # of redirects:     Assessment/Narrative         Paresthesias: No.       CSF fluid: clear.    Comments:  Bupivicaine 15mg    R Terrell

## 2021-03-23 NOTE — PROGRESS NOTES
03/23/21 1500   Quick Adds   Type of Visit Initial PT Evaluation   Living Environment   People in home sibling(s)   Current Living Arrangements house   Home Accessibility stairs to enter home;stairs within home   Number of Stairs, Main Entrance 1   Stair Railings, Main Entrance none   Number of Stairs, Within Home, Primary 10   Stair Railings, Within Home, Primary railing on right side (ascending)   Transportation Anticipated family or friend will provide   Self-Care   Usual Activity Tolerance moderate   Current Activity Tolerance fair   Equipment Currently Used at Home cane, straight   Disability/Function   Wear Glasses or Blind yes   Vision Management reading glasses   Fall history within last six months no   General Information   Onset of Illness/Injury or Date of Surgery 03/23/21   Referring Physician Emmanuel Knight PA-C   Patient/Family Therapy Goals Statement (PT) increase mobility   Pertinent History of Current Problem (include personal factors and/or comorbidities that impact the POC) Pt is a 61 y/o male on POD 0 s/p L TKA   Existing Precautions/Restrictions fall;weight bearing   Weight-Bearing Status - LLE weight-bearing as tolerated   Weight-Bearing Status - RLE full weight-bearing   Cognition   Orientation Status (Cognition) oriented x 4   Affect/Mental Status (Cognition) WFL   Follows Commands (Cognition) WFL   Pain Assessment   Patient Currently in Pain No   Range of Motion (ROM)   ROM Comment decreased L knee ROM   Strength   Strength Comments >3/5 functional strength B LE, able to perform L LE SLR   Bed Mobility   Comment (Bed Mobility) supine <> sit SBA   Transfers   Transfer Safety Comments sit <> stand CGA with FWW   Gait/Stairs (Locomotion)   Baldwin Level (Gait) contact guard   Assistive Device (Gait) walker, front-wheeled   Distance in Feet (Required for LE Total Joints) 80   Pattern (Gait) step-to   Deviations/Abnormal Patterns (Gait) orquidea decreased;stride length  decreased;weight shifting decreased   Comment (Gait/Stairs) CGA with FWW   Balance   Balance Comments normal sitting balance with UE support, good standing balance   Clinical Impression   Criteria for Skilled Therapeutic Intervention yes, treatment indicated   PT Diagnosis (PT) decreased mobility   Influenced by the following impairments decreased ROM, decreased balance   Functional limitations due to impairments decreased bed mobility, transfer, gait, stairs   Clinical Presentation Stable/Uncomplicated   Clinical Presentation Rationale Pt medical condition is stable.    Clinical Decision Making (Complexity) low complexity   Therapy Frequency (PT) Daily   Predicted Duration of Therapy Intervention (days/wks) 2 days   Planned Therapy Interventions (PT) balance training;bed mobility training;cryotherapy;gait training;home exercise program;neuromuscular re-education;ROM (range of motion);stair training;strengthening;transfer training   Anticipated Equipment Needs at Discharge (PT) walker, rolling   Risk & Benefits of therapy have been explained evaluation/treatment results reviewed;care plan/treatment goals reviewed;participants voiced agreement with care plan;current/potential barriers reviewed;risks/benefits reviewed;participants included;patient   PT Discharge Planning    PT Discharge Recommendation (DC Rec)   (Defer to ortho)   PT Rationale for DC Rec Anticipate pt will be SBA with gait and transfers, min A with stairs and bed mobility   PT Brief overview of current status  A x 1 FWW   Total Evaluation Time   Total Evaluation Time (Minutes) 7

## 2021-03-23 NOTE — BRIEF OP NOTE
[facility]    Brief Operative Note    Pre-operative diagnosis: Primary osteoarthritis of left knee [M17.12]  Post-operative diagnosis left knee DJD with severe varus defomity    Procedure: Procedure(s):  Left total knee arthroplasty  Surgeon: Surgeon(s) and Role:     * Shantanu Washburn MD - Primary     * Emmanuel Knight PA-C - Assisting  Anesthesia: Spinal   Estimated blood loss: 20 cc  Drains: Hemovac  Specimens: * No specimens in log *  Findings:   None.  Complications: None.  Implants:   Implant Name Type Inv. Item Serial No.  Lot No. LRB No. Used Action   BONE CEMENT SIMPLEX FULL DOSE 6191-1-001 Cement, Bone BONE CEMENT SIMPLEX FULL DOSE 6191-1-001  [employer] [id number] Left 1 Implanted   IMP COMP FEMORAL S&N LEGION CR NP NARROW 5 LT [id number] Total Joint Component/Insert IMP COMP FEMORAL S&N LEGION CR NP NARROW 5 LT [id number]  [place]  [id number] Left 1 Implanted   [place] Left tibial baseplate    [employer] [id number] Left 1 Implanted   IMP COMP PATELLA SNR BENITO II [id number] Total Joint Component/Insert IMP COMP PATELLA SNR BENITO II 9X32MM [id number]  [place]  [id number] Left 1 Implanted   3-4MM SIZE 9MM, CR XLPE ARTICULAR INSERT    [employer] [id number] Left 1 Implanted

## 2021-03-23 NOTE — ANESTHESIA CARE TRANSFER NOTE
Called and discussed normal NIPT results with Bindu. Results indicate NO ANEUPLOIDY DETECTED for chromosomes 21, 18, 13, or sex chromosomes (XX). Sex was disclosed.      The encounter was conducted with phone  (Jose  Charlotte Archer) due to the patient speaking limited english    This puts her current pregnancy at low risk for Down syndrome, trisomy 18, trisomy 13 and sex chromosome abnormalities. This test is reported to have the following sensitivities: Down syndrome- 99%, trisomy 18- 98%, and trisomy 13- 98%. Although these results are reassuring, this does not replace a standard chromosome analysis from a chorionic villus sampling or amniocentesis.     MSAFP is the appropriate second trimester screening test for open neural tube defects; the maternal quad screen is not recommended.     Her results are available in her Epic chart for her primary OB to review.    Bindu also said she is working with a  to have health insurance during the pregnancy.    Chiara Noble MS, [facility]  Licensed Genetic Counselor   [facility]   [facility]  [email or web address]   Office: [phone] [facility]: [phone]  Pager: [phone] Fax: [phone]         Patient: Meño Sánchez    Procedure(s):  Left total knee arthroplasty    Diagnosis: Primary osteoarthritis of left knee [M17.12]  Diagnosis Additional Information: No value filed.    Anesthesia Type:   Spinal     Note:    Oropharynx: spontaneously breathing  Level of Consciousness: awake  Oxygen Supplementation: face mask  Level of Supplemental Oxygen (L/min / FiO2): 6l  Independent Airway: airway patency satisfactory and stable  Dentition: dentition unchanged  Vital Signs Stable: post-procedure vital signs reviewed and stable  Report to RN Given: handoff report given  Patient transferred to: PACU  Comments: Pt to PACU, V, report to RN  Handoff Report: Identifed the Patient, Identified the Reponsible Provider, Reviewed the pertinent medical history, Discussed the surgical course, Reviewed Intra-OP anesthesia mangement and issues during anesthesia, Set expectations for post-procedure period and Allowed opportunity for questions and acknowledgement of understanding      Vitals: (Last set prior to Anesthesia Care Transfer)  CRNA VITALS  3/23/2021 1125 - 3/23/2021 1202      3/23/2021             NIBP:  101/78    Pulse:  87    NIBP Mean:  85    SpO2:  97 %        Electronically Signed By: LILI Ojeda CRNA  March 23, 2021  12:02 PM

## 2021-03-23 NOTE — PROGRESS NOTES
Arrived to room 621 from PACU at 1400 via cart, transferred to bed via hover mat without difficulty, alert and oriented x 4, oriented to room and call system, dressing CDI, CMS intact, IV patent and infusing, hemovac patent, rates pain 0/10, reviewed welcome folder and pain/medication information packet with patient. SCD's on BLE. Sumanth ice chips well. Frequent VS started.

## 2021-03-24 ENCOUNTER — APPOINTMENT (OUTPATIENT)
Dept: PHYSICAL THERAPY | Facility: CLINIC | Age: 61
End: 2021-03-24
Attending: ORTHOPAEDIC SURGERY
Payer: COMMERCIAL

## 2021-03-24 ENCOUNTER — APPOINTMENT (OUTPATIENT)
Dept: OCCUPATIONAL THERAPY | Facility: CLINIC | Age: 61
End: 2021-03-24
Attending: PHYSICIAN ASSISTANT
Payer: COMMERCIAL

## 2021-03-24 VITALS
TEMPERATURE: 98.1 F | HEIGHT: 65 IN | HEART RATE: 73 BPM | RESPIRATION RATE: 16 BRPM | SYSTOLIC BLOOD PRESSURE: 115 MMHG | BODY MASS INDEX: 31.31 KG/M2 | WEIGHT: 187.9 LBS | OXYGEN SATURATION: 95 % | DIASTOLIC BLOOD PRESSURE: 74 MMHG

## 2021-03-24 LAB
GLUCOSE BLDC GLUCOMTR-MCNC: 126 MG/DL (ref 70–99)
GLUCOSE BLDC GLUCOMTR-MCNC: 135 MG/DL (ref 70–99)
HGB BLD-MCNC: 11.4 G/DL (ref 13.3–17.7)

## 2021-03-24 PROCEDURE — 36415 COLL VENOUS BLD VENIPUNCTURE: CPT | Performed by: PHYSICIAN ASSISTANT

## 2021-03-24 PROCEDURE — 250N000013 HC RX MED GY IP 250 OP 250 PS 637: Performed by: PHYSICIAN ASSISTANT

## 2021-03-24 PROCEDURE — 85018 HEMOGLOBIN: CPT | Performed by: PHYSICIAN ASSISTANT

## 2021-03-24 PROCEDURE — 97116 GAIT TRAINING THERAPY: CPT | Mod: GP | Performed by: PHYSICAL THERAPIST

## 2021-03-24 PROCEDURE — 250N000011 HC RX IP 250 OP 636: Performed by: PHYSICIAN ASSISTANT

## 2021-03-24 PROCEDURE — 82962 GLUCOSE BLOOD TEST: CPT

## 2021-03-24 PROCEDURE — 97166 OT EVAL MOD COMPLEX 45 MIN: CPT | Mod: GO | Performed by: OCCUPATIONAL THERAPIST

## 2021-03-24 PROCEDURE — 97535 SELF CARE MNGMENT TRAINING: CPT | Mod: GO | Performed by: OCCUPATIONAL THERAPIST

## 2021-03-24 RX ORDER — OXYCODONE HYDROCHLORIDE 5 MG/1
5 TABLET ORAL EVERY 4 HOURS PRN
Qty: 35 TABLET | Refills: 0 | Status: SHIPPED | OUTPATIENT
Start: 2021-03-24 | End: 2021-03-31

## 2021-03-24 RX ORDER — ACETAMINOPHEN 325 MG/1
650 TABLET ORAL EVERY 4 HOURS PRN
Qty: 100 TABLET | Refills: 0 | Status: SHIPPED | OUTPATIENT
Start: 2021-03-26

## 2021-03-24 RX ORDER — DOCUSATE SODIUM 100 MG/1
100 CAPSULE, LIQUID FILLED ORAL 2 TIMES DAILY
Start: 2021-03-24 | End: 2022-08-15

## 2021-03-24 RX ADMIN — ACETAMINOPHEN 975 MG: 325 TABLET, FILM COATED ORAL at 09:15

## 2021-03-24 RX ADMIN — GABAPENTIN 100 MG: 100 CAPSULE ORAL at 07:46

## 2021-03-24 RX ADMIN — ACETAMINOPHEN 975 MG: 325 TABLET, FILM COATED ORAL at 00:06

## 2021-03-24 RX ADMIN — ASPIRIN 81 MG: 81 TABLET ORAL at 07:47

## 2021-03-24 RX ADMIN — METFORMIN HYDROCHLORIDE 1000 MG: 500 TABLET, FILM COATED ORAL at 07:46

## 2021-03-24 RX ADMIN — OXYCODONE HYDROCHLORIDE 5 MG: 5 TABLET ORAL at 00:06

## 2021-03-24 RX ADMIN — CEFAZOLIN SODIUM 2 G: 2 INJECTION, SOLUTION INTRAVENOUS at 02:16

## 2021-03-24 RX ADMIN — OXYCODONE HYDROCHLORIDE 10 MG: 5 TABLET ORAL at 07:46

## 2021-03-24 RX ADMIN — OXYCODONE HYDROCHLORIDE 10 MG: 5 TABLET ORAL at 11:46

## 2021-03-24 ASSESSMENT — ACTIVITIES OF DAILY LIVING (ADL): PREVIOUS_RESPONSIBILITIES: MEAL PREP;HOUSEKEEPING;LAUNDRY;MEDICATION MANAGEMENT;FINANCES

## 2021-03-24 NOTE — PLAN OF CARE
Patient vital signs are at baseline: Yes  Patient able to ambulate as they were prior to admission or with assist devices provided by therapies during their stay:  Yes  Patient MUST void prior to discharge:  Yes  Patient able to tolerate oral intake:  Yes  Pain has adequate pain control using Oral analgesics:  Yes. Walking in the halls, voiding adequate amounts, tolerating regular diet well.  despite corrective dose at dinner and metformin 1000mg. Still rating pain at 0/10, reminded to call if he starts to notice an increase in pain. Will keep monitoring.

## 2021-03-24 NOTE — DISCHARGE INSTRUCTIONS
POST OP TOTAL KNEE INSTRUCTIONS:    Medication:     Pain medication:  You will be discharged from the hospital with prescription opioid pain medication(s); Oxycodone. In most cases, consistent use of this type of pain medication can be limited to a few days. After this period, the medication should be weaned off by decreasing the dose and/or increasing time between doses, as soon as possible to avoid potential complications of constipation, nausea, or rash, etc.   As you taper off the pain pills, you may find using them only at nighttime and then controlling the pain with over-the-counter medication(s) such as Ibuprofen OR Naproxen sodium (but not bother) and Tylenol during the day, would be a good way of managing the pain.  NOTE:  if you were prescribed Celebrex/Celecoxhib, Do NOT start Ibuprofen (advil/Motrin) or Naproxen (Naprosyn/Alleve) until done taking it.    In order to minimize the potential problem of blood clot:  If you were on Coumadin or other anticoagulation therapies before the surgery, you will resume these after the surgery.  If you were not on an anticoagulant/blood thinner prior to surgery, you will be asked to do one of the following.  1.Take aspirin (low risk patients).    In order to prevent constipation:  You may also be sent home with stool softener, it is recommended you take this until your bowel movements become normal for you.  You may also need to add a laxative to the routine if you have not had a bowel movement for several days following surgery.  Also, Increase water and fiber intake while on pain medication to help prevent constipation.       Other medications prior to surgery:  consult the medication list in your discharge instructions for any changes.    Activity: You will be using a walker or crutches until you feel confident. You also need to use the knee immobilizer until you are able to straight leg raise 10-15 times. Gradual rather than sudden increase of walking distance is  recommended as the comfort level dictates. A cane instead of walker or crutches can be used when your strength and balance are improved.    Dressing: *(see below as well).  Typically, the first dressing change will be done in the hospital before discharge.   If not, please perform this at home on the 3rd day after surgery.  Showers can be taken with plastic covering over the a non-water proof post surgical dressing during this period. After that, the incision site can be wet for showering but should not be soaked. A light gauze dressing along with paper tape should be placed over the wound after each shower. The staples will be removed 10-14 days from the operation. During that time it is best to cover the incision site to protect the staples from being pulled or snagged.    *If you were discharged with an aquacell dressing, (flesh colored rubber like bandage), you may leave this dressing in place until your follow up appointment in the clinic, unless the dressing; becomes completely saturated, is leaking, gets water inside as evident by moisture appearing on the inside, or you have a skin reaction.  In this case you should remove it and use dressings like what is mentioned in the paragraph above.    Exercises: It is recommended that you do the flexibility exercises (range of motion) and strengthening exercises in a small amounts frequently throughout the day rather than infrequent long sessions. Being overly aggressive with the exercises can hinder rather than help. We are looking for a gradual steady improvement even though the general goal for range of motion post-operatively is 0-90 degrees of bending within the first 2 weeks. During this time, gaining full Extension (straightening) is far more important than gaining flexion. Generally, you be working with a physical therapist 2-3 times per week for the first 2-4 weeks.    5 keys to the knee,- to be done throughout the day:  1. Knee Bending: Dangle- in a seated  position where foot can swing, let the leg from the knee down dangle off the edge of bed or table. You may use the other leg/foot to gently push the affected leg into more bending.  2. Knee straightening: Heel blocking - While sitting or laying, place pillow, ottoman or some other support under the heel with toes pointing up but ankle relaxed.  Rest there, working up to 10 minutes relaxing the leg to induce knee straightening.  Once relaxed, push/squeeze the knee towards the floor, hold for 2 seconds, and relax. Repeat x 10.    NOTE:  Alternate 1 and 2 every other hour.  3. Straight leg raises 3x daily: from a laying or sitting position, while keeping the knee locked straight, slowly lift the foot up 12-16 inches and hold for 2 seconds and relax.  Repeat working up to 20 repetitions.    4. Ice and elevate: whenever you are not up and about.  No standing around or sitting with the knee bend for more than very short periods.  5. Walk:  2-5 minutes of short laps every couple hours.  Use assistive devices as needed such as walker or cane to promote safety.  Walk focusing more on good form than getting somewhere.  Lead with the knee and then straighten the knee trying to make the surgical leg move like normal knee.    Soft tissue: It is not unusual to have swelling in the leg; thigh down to through the toes for several months from the surgery.  Frequent ankle pumping, elevation of the leg above the heart level and gentle compression support with ace wrap should help with swelling. If you wrap the leg, start at the toes and end in the thigh.  Icing regularly, for 20 minutes every hour, will also help with swelling and pain.  Flushing (pink) of the tissue is also often noted due to excess blood flow for healing.   You may also experience bruising.  This may occur anywhere from your toes, through the leg and even onto your torso.  It may show up even 1 week after surgery.     Progressively worsening redness along with  increasing pain or increasing drainage from the wound should be a reason to alert us immediately.     Follow up in clinic within 14 days after surgery.  May call [phone] to schedule.    Emmanuel nKight PA-C  [facility] - Surgery  [facility]  [address] [place]  [address]  [phone]

## 2021-03-24 NOTE — PLAN OF CARE
Patient vital signs are at baseline: Yes  Patient able to ambulate as they were prior to admission or with assist devices provided by therapies during their stay:  Yes  Patient MUST void prior to discharge:  Yes  Patient able to tolerate oral intake:  Yes  Pain has adequate pain control using Oral analgesics:  Yes    A/O x 4.  VSS, afebrile.  Pain managed with oxycodone/tylenol and ice.  CMS intact, baseline neuropathy.  Acewrap removed, aquacel placed.  Hemovac drain removed.  Up with A1 and WW ambulating in room and uribe.  Tolerating regular diet.  Voiding adequately.  Reviewed discharge instructions with patient, answered questions.  Pt sent home with filled Rx's of aspirin, tylenol and oxycodone.  Will be walked out by NA when ride arrives.

## 2021-03-24 NOTE — PLAN OF CARE
Physical Therapy Discharge Summary    Reason for therapy discharge:    All goals and outcomes met, no further needs identified.    Progress towards therapy goal(s). See goals on Care Plan in [facility] electronic health record for goal details.  Goals met    Therapy recommendation(s):    Defer to ortho

## 2021-03-24 NOTE — PLAN OF CARE
[unfilled]                                                                              [facility]      OUTPATIENT PHYSICAL THERAPY EVALUATION  PLAN OF TREATMENT FOR OUTPATIENT REHABILITATION  (COMPLETE FOR INITIAL CLAIMS ONLY)  Patient's Last Name, First Name, M.I.  YOB: 1960  Meño Sánchez                        Provider's Name  [facility] Medical Record No.  [id number]                               Onset Date:  03/23/21   Start of Care Date:    03/24/21     Type:     _X_PT   ___OT   ___SLP Medical Diagnosis:   L TKA                        PT Diagnosis:  decreased mobility   Visits from SOC:  1   _________________________________________________________________________________  Plan of Treatment/Functional Goals    Planned Interventions: balance training, bed mobility training, cryotherapy, gait training, home exercise program, neuromuscular re-education, ROM (range of motion), stair training, strengthening, transfer training     Goals: See Physical Therapy Goals on Care Plan in [employer] electronic health record.    Therapy Frequency: Daily  Predicted Duration of Therapy Intervention: 2 days  _________________________________________________________________________________    I CERTIFY THE NEED FOR THESE SERVICES FURNISHED UNDER        THIS PLAN OF TREATMENT AND WHILE UNDER MY CARE     (Physician co-signature of this document indicates review and certification of the therapy plan).                  Referring Physician: Emmanuel Knight PA-C            Initial Assessment        See Physical Therapy evaluation dated   in Epic electronic health record.

## 2021-03-24 NOTE — PLAN OF CARE
Occupational Therapy Discharge Summary    Reason for therapy discharge:    All goals and outcomes met, no further needs identified.    Progress towards therapy goal(s). See goals on Care Plan in [facility] electronic health record for goal details.  Goals met    Therapy recommendation(s):    No further therapy is recommended.

## 2021-03-24 NOTE — PLAN OF CARE
[unfilled]                                                                              [facility]      OUTPATIENT OCCUPATIONAL THERAPY  EVALUATION  PLAN OF TREATMENT FOR OUTPATIENT REHABILITATION  (COMPLETE FOR INITIAL CLAIMS ONLY)  Patient's Last Name, First Name, M.I.  YOB: 1960  Meño Sánchez                          Provider's Name  [facility] Medical Record No.  [id number]                               Onset Date:  03/23/21   Start of Care Date:  03/24/21     Type:     ___PT   _X_OT   ___SLP Medical Diagnosis:  TKA                        OT Diagnosis:  impaired ADLs   Visits from SOC:  1   _________________________________________________________________________________  Plan of Treatment/Functional Goals    Planned Interventions: ADL retraining, transfer training   Goals: See Occupational Therapy Goals on Care Plan in [facility] electronic health record.    Therapy Frequency: Daily  Predicted Duration of Therapy Intervention: 1 day  _________________________________________________________________________________    I CERTIFY THE NEED FOR THESE SERVICES FURNISHED UNDER        THIS PLAN OF TREATMENT AND WHILE UNDER MY CARE     (Physician co-signature of this document indicates review and certification of the therapy plan).              Certification date from: 03/24/21, Certification date to: 03/24/21    Referring Physician: Emmanuel Knight            Initial Assessment        See Occupational Therapy evaluation dated 03/24/21 in Epic electronic health record.

## 2021-03-24 NOTE — DISCHARGE SUMMARY
[facility]  Discharge Summary            Interval History:     60 year old male admitted postoperatively for elective Left total knee arthroplasty on 03/23/2021,  with Dr. Shantanu Washburn due to DJD unresponsive to non operative treatment.  There were no notable intraoperative complications.  Patient being discharged post op Day #1.  Meño Sánchez received skilled nursing, PT, OT, SW inquiry.  There was no Hospitalist care during the stay.     Meño Sánchez has progressed satisfactorily with ambulation and pain management and without medical complication.  Patient is confident in their discharge and has  met goals with PT and OT. Pt lives at home with help and will be discharged to home based on home living conditions and level of support.  Meño Sánchez leaves the hospital in stable condition with good chance for recovery.  Today: doing well.  Eating, ambulating, resting, no issues or complaints.     Pain: tolerable.  Nausea: none.  Light headedness : none  Chest pain: none  Shortness of breath: none              Assessment and Plan:     S/P L TKA Day #1.  Final Diagnosis: same.  VSS, Hemodynamically asymptomatic, pain management adequate.    PLAN:  DVT prophylaxis with daily aspirin, as patient has no history of VTE.  Pain management with Oxycodone and tylenol  Bowel regimen.  OP PT.  Patient instructions attached to discharge.      Discharge to home.  Follow up in clinic as previously scheduled, approx 10-14 days post op.    [facility]  [address] [place]  [address]  [phone]  [phone] fax  [phone] for scheduling                      Physical Exam:     Patient Vitals for the past 12 hrs:   BP Temp Temp src Pulse Resp SpO2   03/24/21 0744 115/74 98.1  F (36.7  C) Temporal 73 16 95 %   03/24/21 0713 114/61 98.6  F (37  C) Temporal 71 16 96 %   03/24/21 0256 117/64 97.5  F (36.4  C) Temporal 60 18 94 %   03/24/21 0006 -- -- -- -- 18 --   03/23/21 2350 115/63 98.1  F (36.7   C) Temporal 89 18 95 %     Hemoglobin   Date Value Ref Range Status   03/24/2021 11.4 (L) 13.3 - 17.7 g/dL Final   03/23/2021 14.2 13.3 - 17.7 g/dL Final       Patient is alert and oriented.  Vitals: stable  Neurovascular status: Grossly intact to LLE  Dressing: clean and dry  Calves: soft and non tender          Emmanuel Knight PA-C  [facility] - Surgery    3/24/2021

## 2021-03-24 NOTE — PROGRESS NOTES
03/24/21 1037   Quick Adds   Type of Visit Initial Occupational Therapy Evaluation   Living Environment   People in home sibling(s)   Current Living Arrangements house   Home Accessibility stairs to enter home;stairs within home   Number of Stairs, Main Entrance 1   Stair Railings, Main Entrance none   Number of Stairs, Within Home, Primary 10   Stair Railings, Within Home, Primary railing on right side (ascending)   Transportation Anticipated family or friend will provide   Self-Care   Usual Activity Tolerance moderate   Current Activity Tolerance fair   Equipment Currently Used at Home none   Activity/Exercise/Self-Care Comment Pt indep with ADLs/mobility at baseline   Disability/Function   Walking or Climbing Stairs Difficulty no   Dressing/Bathing Difficulty no   Toileting issues no   Doing Errands Independently Difficulty (such as shopping) no   Fall history within last six months no   Change in Functional Status Since Onset of Current Illness/Injury yes   General Information   Onset of Illness/Injury or Date of Surgery 03/23/21   Referring Physician Emmanuel Knight   Patient/Family Therapy Goal Statement (OT) home   Additional Occupational Profile Info/Pertinent History of Current Problem s/p L TKA   Existing Precautions/Restrictions fall   Left Lower Extremity (Weight-bearing Status) weight-bearing as tolerated (WBAT)   General Observations and Info Activity order: ambulate with assist 3x daily   Cognitive Status Examination   Cognitive Status Comments no cognitive concerns   Pain Assessment   Patient Currently in Pain Yes, see Vital Sign flowsheet   Integumentary/Edema   Integumentary/Edema Comments post-op dressing on surgical site   Posture   Posture protracted shoulders   Range of Motion Comprehensive   Comment, General Range of Motion pt demonstrated functional BUE AROM during dressing task   Strength Comprehensive (MMT)   Comment, General Manual Muscle Testing (MMT) Assessment pt demonstrated  functional BUE strength during transfers   Bed Mobility   Bed Mobility supine-sit   Supine-Sit [place] (Bed Mobility) independent   Transfers   Transfers sit-stand transfer;toilet transfer;shower transfer   Sit-Stand Transfer   Sit-Stand [place] (Transfers) contact guard;verbal cues   Shower Transfer   Type (Shower Transfer) stand pivot/stand step   [place] Level (Shower Transfer) contact guard;verbal cues;supervision   Shower Transfer Comments walk-in shower   Toilet Transfer   Type (Toilet Transfer) sit-stand;stand-sit   [place] Level (Toilet Transfer) contact guard;verbal cues   Balance   Balance Comments good seated; good ambulating in room with 2WW   Instrumental Activities of Daily Living (IADL)   Previous Responsibilities meal prep;housekeeping;laundry;medication management;finances   IADL Comments pt reports setting up groceries/laundry prior to surgery; siblings can assist if needed   Clinical Impression   Criteria for Skilled Therapeutic Interventions Met (OT) yes;meets criteria;skilled treatment is necessary   OT Diagnosis impaired ADLs   OT Problem List-Impairments impacting ADL problems related to;activity tolerance impaired;mobility;range of motion (ROM);strength;pain   Assessment of Occupational Performance 3-5 Performance Deficits   Identified Performance Deficits dressing, bathing, toileting, home chores   Planned Therapy Interventions (OT) ADL retraining;transfer training   Clinical Decision Making Complexity (OT) moderate complexity   Therapy Frequency (OT) Daily   Predicted Duration of Therapy 1 day   Risk & Benefits of therapy have been explained evaluation/treatment results reviewed;care plan/treatment goals reviewed;risks/benefits reviewed;participants voiced agreement with care plan;participants included;patient   OT Discharge Planning    OT Discharge Recommendation (DC Rec) Home with assist   OT Rationale for DC Rec Pt progressing well, anticipate he will be able to  discharge to home with assist for home chores, once medically stable.   OT Brief overview of current status  Robbin transfers, Robbin dressing, Robbin toilet transfer, Robbin shower transfer   Therapy Certification   Start of Care Date 03/24/21   Certification date from 03/24/21   Certification date to 03/24/21   Medical Diagnosis TKA   Total Evaluation Time (Minutes)   Total Evaluation Time (Minutes) 8

## 2021-03-24 NOTE — PLAN OF CARE
Patient vital signs are at baseline: Yes  Patient able to ambulate as they were prior to admission or with assist devices provided by therapies during their stay:  Yes  Patient MUST void prior to discharge:  Yes  Patient able to tolerate oral intake:  Yes  Pain has adequate pain control using Oral analgesics:  Yes    Alert and oriented. Dressing C/D/I. Denied much pain. Started on oxycodone. Assist of 1 with a walker and gait belt. Voiding adequately.

## 2021-03-25 ENCOUNTER — TELEPHONE (OUTPATIENT)
Dept: ORTHOPEDICS | Facility: CLINIC | Age: 61
End: 2021-03-25

## 2021-03-25 NOTE — TELEPHONE ENCOUNTER
Spoke with patient.  Doing well, no questions at this time.  Offered number for nurse triage and confirmed follow up appointment.    Recommend tylenol on schedule.    Oxycodone.    Emmanuel Knight PA-C  [facility] - Surgery

## 2021-03-29 ENCOUNTER — THERAPY VISIT (OUTPATIENT)
Dept: PHYSICAL THERAPY | Facility: CLINIC | Age: 61
End: 2021-03-29
Payer: COMMERCIAL

## 2021-03-29 ENCOUNTER — TELEPHONE (OUTPATIENT)
Dept: ORTHOPEDICS | Facility: CLINIC | Age: 61
End: 2021-03-29

## 2021-03-29 DIAGNOSIS — Z47.89 ORTHOPEDIC AFTERCARE: ICD-10-CM

## 2021-03-29 DIAGNOSIS — Z96.652 S/P TKR (TOTAL KNEE REPLACEMENT) USING CEMENT, LEFT: Primary | ICD-10-CM

## 2021-03-29 DIAGNOSIS — Z96.652 S/P TKR (TOTAL KNEE REPLACEMENT) USING CEMENT, LEFT: ICD-10-CM

## 2021-03-29 DIAGNOSIS — Z47.89 ORTHOPEDIC AFTERCARE: Primary | ICD-10-CM

## 2021-03-29 DIAGNOSIS — Z96.659 STATUS POST TOTAL KNEE REPLACEMENT: ICD-10-CM

## 2021-03-29 DIAGNOSIS — Z96.652 STATUS POST TOTAL LEFT KNEE REPLACEMENT: ICD-10-CM

## 2021-03-29 PROCEDURE — 97161 PT EVAL LOW COMPLEX 20 MIN: CPT | Mod: GP | Performed by: PHYSICAL THERAPIST

## 2021-03-29 PROCEDURE — 97110 THERAPEUTIC EXERCISES: CPT | Mod: GP | Performed by: PHYSICAL THERAPIST

## 2021-03-29 ASSESSMENT — ACTIVITIES OF DAILY LIVING (ADL)
STIFFNESS: THE SYMPTOM AFFECTS MY ACTIVITY SEVERELY
GO DOWN STAIRS: ACTIVITY IS FAIRLY DIFFICULT
SIT WITH YOUR KNEE BENT: ACTIVITY IS VERY DIFFICULT
SWELLING: THE SYMPTOM AFFECTS MY ACTIVITY SLIGHTLY
HOW_WOULD_YOU_RATE_THE_CURRENT_FUNCTION_OF_YOUR_KNEE_DURING_YOUR_USUAL_DAILY_ACTIVITIES_ON_A_SCALE_FROM_0_TO_100_WITH_100_BEING_YOUR_LEVEL_OF_KNEE_FUNCTION_PRIOR_TO_YOUR_INJURY_AND_0_BEING_THE_INABILITY_TO_PERFORM_ANY_OF_YOUR_USUAL_DAILY_ACTIVITIES?: 20
KNEEL ON THE FRONT OF YOUR KNEE: I AM UNABLE TO DO THE ACTIVITY
GO UP STAIRS: ACTIVITY IS FAIRLY DIFFICULT
AS_A_RESULT_OF_YOUR_KNEE_INJURY,_HOW_WOULD_YOU_RATE_YOUR_CURRENT_LEVEL_OF_DAILY_ACTIVITY?: ABNORMAL
HOW_WOULD_YOU_RATE_THE_OVERALL_FUNCTION_OF_YOUR_KNEE_DURING_YOUR_USUAL_DAILY_ACTIVITIES?: ABNORMAL
KNEE_ACTIVITY_OF_DAILY_LIVING_SUM: 20
WEAKNESS: THE SYMPTOM AFFECTS MY ACTIVITY MODERATELY
SQUAT: I AM UNABLE TO DO THE ACTIVITY
RAW_SCORE: 20
LIMPING: THE SYMPTOM AFFECTS MY ACTIVITY SEVERELY
RISE FROM A CHAIR: ACTIVITY IS FAIRLY DIFFICULT
PAIN: THE SYMPTOM AFFECTS MY ACTIVITY SEVERELY
GIVING WAY, BUCKLING OR SHIFTING OF KNEE: THE SYMPTOM AFFECTS MY ACTIVITY MODERATELY
KNEE_ACTIVITY_OF_DAILY_LIVING_SCORE: 28.57
STAND: ACTIVITY IS FAIRLY DIFFICULT
WALK: ACTIVITY IS VERY DIFFICULT

## 2021-03-29 NOTE — PROGRESS NOTES
[facility]: Physical Therapy Initial Evaluation   Mar 29, 2021  Comments/Precautions/Restrictions/Physician instructions:     Subjective:   Chief Complaint: Left Total Knee Arthroplasty    Pain: localized to knee    Numbness/Tingling: denies any new numbness/tingling   Weakness: yes, left lower extremity   Stiffness: yes, left knee  DOI/onset: history of chronic knee pain, unknown onset  DOS: 3/23/21 with Dr. 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    Previous Treatment (Effect): physical therapy in the past for previous knee surgery, currently using ice for pain relief after surgery  AM/PM: pain is not time dependent  Quality of Pain: dull, ache  Pain: 7/10 at present, 0/10 at best, 10/10 at worst  Worse: walking, standing, stairs  Better: varying positions of knee while laying down  Progression of Symptoms since onset: staying about the same   Hx of Falls: no  Sleeping: no difficulty sleeping  Current Functional Status: SEE GOALS FLOWSHEET   Previous Functional Status: independent with no assistive device  Current HEP/exercise regimen: ankle pumps, heel slides, straight leg raise  Transportation to Therapy:  brother in law and sister that are able to drive him  Live with Others: brother in law, sister, niece, small dog   Red Flags:   - Patient denies the following: Locking, Catching (knee); Pain with Cough / Sneeze / Laughing ; Night Pain ; Fever ; Weakness ; Saddle Anesthesia ; Change in Bowel or Bladder ; Chest Pain ; Unexplained Weight Loss ; Rashes or Lesions of the  Skin ; Non-Healing Wounds ; Edema of the Feet ; Vision Change ; Calf Pain/Swelling/Warmth  - Patient reports the following: numbness and tingling, however he experienced this prior to surgery  Patient's goal(s): relief of pain    Weightbearing Restrictions: none    Objective:    Stairs: stairs in the house-15 steps. Independent, however only goes up the stairs to do laundry or for appointments    Bed Transfers: independent with increased time    Skin/Surgical Site: intact, minimal redness and swelling    Gait/AD: walker in the house and in community    ROM:    L R   KNEE     Hyper Ext 0    Ext 17    Flex 79      Jyotsna's: negative    Quad Set: able to complete with minimal tactile cues    Assessment/Plan:    Patient is a 60 year old male with left side knee complaints.    Patient has the following significant findings with corresponding treatment plan.                Referring Diagnosis: S/P Left Total Arthroplasty    Decreased ROM/flexibility - manual therapy and therapeutic exercise and home program  Decreased joint mobility - manual therapy and therapeutic exercise and home program  Decreased strength - therapeutic exercise, therapeutic activities and home program  Edema - cold therapy and self management/home program  Impaired gait - gait training, assistive devices and home program  Impaired muscle performance - neuro re-education and home program  Decreased function - therapeutic activities and home program        Therapy Evaluation Codes:   1) History comprised of:   Personal factors that impact the plan of care:      None.    Comorbidity factors that impact the plan of care are:      Diabetes, Overweight, Pain at night/rest and Sleep disorder/apnea.     Medications impacting care: Pain.  2) Examination of Body Systems comprised of:   Body structures and functions that impact the plan of care:      Knee.   Activity limitations that impact the plan of care are:      Driving, Squatting/kneeling, Stairs, Standing  and Walking.  3) Clinical presentation characteristics are:   Stable/Uncomplicated.  4) Decision-Making    Low complexity using standardized patient assessment instrument and/or measureable assessment of functional outcome.  Cumulative Therapy Evaluation is: Low complexity.    Previous and current functional limitations:  (See Goal Flow Sheet for this information)    Short term and Long term goals: (See Goal Flow Sheet for this information)     Communication ability:  Patient appears to be able to clearly communicate and understand verbal and written communication and follow directions correctly.  Treatment Explanation - The following has been discussed with the patient:   RX ordered/plan of care  Anticipated outcomes  This patient would benefit from PT intervention to resume normal activities.   Rehab potential is good.    Frequency: 1-2 X week, once daily  Duration:  for 8 weeks  Discharge Plan:  Achieve all LTG.  Independent in home treatment program.  Reach maximal therapeutic benefit.    Please refer to the daily flowsheet for treatment today, total treatment time and time spent performing 1:1 timed codes.

## 2021-03-29 NOTE — TELEPHONE ENCOUNTER
Reason for call: Needs a refill on his Pain Medication. Please call him back and let him know if this is possible.    Phone number to reach patient:  Home number on file [phone] (home)    Best Time:  any    Can we leave a detailed message on this number?  NO

## 2021-03-30 NOTE — TELEPHONE ENCOUNTER
Spoke with patient this morning.      Medication Request for: Roxicone 5 mg tabs            Patient currently taking: yes  Patient has 0 of medication remaining.  Took last dose this AM.  Patient is also taking OTC: none    Problems/ Concerns of Patient: no side effects reported  Prescription last written on 3/24/21 by Gregg Knight PA-C  Sig: Take 1 tablet (5 mg) by mouth every 4 hours as needed for moderate to severe pain - Oral   Last Fill Quantity: 35 with # refills: 0     Last Office Visit by provider:   Date of Surgery (if applicable): 3/23/21  Procedure Performed (if applicable): left total knee arthroplasty  Future Office Visit:   4/5/21  Patient desires to have faxed or E-prescribe to pharmacy if available  Pharmacy selected in [facility].    Phone number patient can be reached at: Cell number on file:    Telephone Information:   Mobile [phone]       Can we leave a detailed message on this number? YES    Medication requests may take up to 3 business days for a response  Has patient been notified of this Yes    Dr Washburn please advise on post-op pain medication.  Pharmacy selected.    Luisito Lyles, ATC

## 2021-03-31 RX ORDER — OXYCODONE HYDROCHLORIDE 5 MG/1
TABLET ORAL
Qty: 20 TABLET | Refills: 0 | COMMUNITY
Start: 2021-03-31 | End: 2021-04-05

## 2021-03-31 NOTE — TELEPHONE ENCOUNTER
Message from Meño. Says he is out of Oxy and in a lot of pain. Requests a refill.  Please call back.

## 2021-03-31 NOTE — TELEPHONE ENCOUNTER
Phone call to patient and apologized for the delay. Informed provider is in surgery and we will have him fill it asap. Explained that he will need to have someone  the hard copy of the prescription or we can can walk to pharmacy downstairs.   He asks that prescription be walked downstairs to [facility].     Informed writer was obtaining prescription from provider now and will walk downstairs. Asked that he give pharmacy at least an hour and to call to see when it would be ready.   He verbalized understanding.     Prescription for Oxycodone 5mg, take 1-2 tabs every 6 hrs prn, #20, 0 refills walked to [facility] and placed in [facility] historically.     GREGORY Wilson RN

## 2021-04-01 ENCOUNTER — THERAPY VISIT (OUTPATIENT)
Dept: PHYSICAL THERAPY | Facility: CLINIC | Age: 61
End: 2021-04-01
Payer: COMMERCIAL

## 2021-04-01 DIAGNOSIS — Z96.652 S/P TKR (TOTAL KNEE REPLACEMENT) USING CEMENT, LEFT: ICD-10-CM

## 2021-04-01 PROCEDURE — 97110 THERAPEUTIC EXERCISES: CPT | Mod: GP | Performed by: PHYSICAL THERAPIST

## 2021-04-01 PROCEDURE — 97140 MANUAL THERAPY 1/> REGIONS: CPT | Mod: GP | Performed by: PHYSICAL THERAPIST

## 2021-04-03 ENCOUNTER — TELEPHONE (OUTPATIENT)
Dept: ORTHOPEDICS | Facility: CLINIC | Age: 61
End: 2021-04-03

## 2021-04-03 DIAGNOSIS — Z96.652 S/P TKR (TOTAL KNEE REPLACEMENT) USING CEMENT, LEFT: ICD-10-CM

## 2021-04-03 RX ORDER — OXYCODONE HYDROCHLORIDE 5 MG/1
TABLET ORAL
Qty: 20 TABLET | Refills: 0 | Status: CANCELLED | OUTPATIENT
Start: 2021-04-03

## 2021-04-03 NOTE — TELEPHONE ENCOUNTER
Pending Prescriptions:                       Disp   Refills    oxyCODONE (ROXICODONE) 5 MG tablet        20 tab*0            Sig: Take 1-2 tabs every 6 hrs prn    Last filled 3-31-21  20 for a 3 ds     Thank you,  Lisa Hooker [facility]  [phone]

## 2021-04-05 ENCOUNTER — OFFICE VISIT (OUTPATIENT)
Dept: ORTHOPEDICS | Facility: CLINIC | Age: 61
End: 2021-04-05
Payer: COMMERCIAL

## 2021-04-05 DIAGNOSIS — G89.18 POST-OP PAIN: Primary | ICD-10-CM

## 2021-04-05 DIAGNOSIS — Z96.652 S/P TKR (TOTAL KNEE REPLACEMENT) USING CEMENT, LEFT: ICD-10-CM

## 2021-04-05 DIAGNOSIS — Z47.89 ORTHOPEDIC AFTERCARE: Primary | ICD-10-CM

## 2021-04-05 DIAGNOSIS — G89.18 POST-OP PAIN: ICD-10-CM

## 2021-04-05 PROCEDURE — 99024 POSTOP FOLLOW-UP VISIT: CPT | Performed by: PHYSICIAN ASSISTANT

## 2021-04-05 RX ORDER — OXYCODONE HYDROCHLORIDE 5 MG/1
5 TABLET ORAL EVERY 6 HOURS PRN
Qty: 25 TABLET | Refills: 0 | Status: SHIPPED | OUTPATIENT
Start: 2021-04-05 | End: 2022-08-15

## 2021-04-05 RX ORDER — OXYCODONE HYDROCHLORIDE 5 MG/1
5 TABLET ORAL
Qty: 20 TABLET | Refills: 0 | Status: SHIPPED | OUTPATIENT
Start: 2021-04-05 | End: 2021-04-05

## 2021-04-05 NOTE — PATIENT INSTRUCTIONS
Incision care instructions:  Keep dry 24 hours.  Showering is ok after that time, however no soaking or scrubbing of incision for 2 weeks.  Tape-strips will most likely fall off on their own, however they may be removed after 2 weeks with rubbing alcohol if they have not.    If draining or bleeding stops the tape-strips are enough coverage unless you were instructed otherwise or you would like to cover for comfort.  If drainage or bleeding continues please cover with clean dressings.     It is recommended that you avoid invasive procedures such as dental work, colonoscopy or other surgery for 4 months after surgery unless it is an emergency.  In the case of an emergency, please notify the provider, and an antibiotic may be prescribed for you.    You may discontinue the aspirin or return to a dose recommended by your Primary care provider 6 weeks after surgery.    You may increase your activities as you can tolerate them.  It is recommended that you do not do prolonged kneeling.    It is also recommended that you continue your exercises on your own for several additional months to avoid regressing.    Wrap:  For swelling if needed.  Tension sock tight from foot to thigh overlapping the wrap by 1/2 as you go up.  Put on in the morning and remove at night.      Please follow up in 4 weeks for an X ray of your knee.

## 2021-04-05 NOTE — TELEPHONE ENCOUNTER
Duplicate. This was filled by Gregg Knight PA-C today at his appointment.   Prescription cancelled.     GREGORY Wilson RN

## 2021-04-05 NOTE — TELEPHONE ENCOUNTER
20 tablets of oxycodone to be taken at night as needed.   This will be the last pain medication prescription.

## 2021-04-06 NOTE — PROGRESS NOTES
HISTORY OF PRESENT ILLNESS:    Meño Sánchez is a 60 year old male who is seen in follow up for Left TKA, DOS 03/24/2021, Dr. Washburn.  Present symptoms:Taking tylenol but does not think it helps.  Icing 2-3 times per day.   Sleeping ok.  Feels main complaint is not enough pain medication in order to do therapy, however is able to perform basic exercises.  Using walker for ambulation.   While frustrated, pt admits progress when questioned.    Denies Chest pain, Calve pain, Fever, Chills.    Current Treatment: postop, PT    PHYSICAL EXAM:  There were no vitals taken for this visit.  There is no height or weight on file to calculate BMI.   GENERAL APPEARANCE: healthy, alert and no distress   PSYCH:  mentation appears normal and affect normal/bright    MSK:  Left: \Knee .  Ambulates: limps on left with 2w walker.  Incision clean and dry, staples under aquacell present, healing.  Appropriate incisional erythema.   No Ecchymosis.  No calve pain on palpation.  Edema mild to moderate at knee, min below knee.  CMS: los incisional numbness, otherwise grossly intact.  AROM Flexion 10-85.      IMAGING INTERPRETATION:  None today.       ASSESSMENT:  Meño Sánchez is a 60 year old male S/P Left TKA, DOS 3/24/21, Dr. Washburn.  Healing.  Discussed that if pain is minimal at rest and he can sleep well, and he is progressing well despite this discomfort, pain control is probable adequate .    PLAN:  - Surgery discussed, images reviewed if applicable, and all questions were answered at this time.  - staples removed with sterile technique, steri-strips applied in usual fashion, care instructions given and verbally acknowledged.  - Medications: small refill oxycodone, recommend he maximize OTC, and RICE  - Physical therapy: continue with current physical therapy  - Focus on ROM for now.  - AAT    Return to clinic 4, weeks, for an x ray.    Emmanuel Knight PA-C    Dept. Orthopedic Surgery  [facility]   4/5/2021

## 2021-04-09 ENCOUNTER — THERAPY VISIT (OUTPATIENT)
Dept: PHYSICAL THERAPY | Facility: CLINIC | Age: 61
End: 2021-04-09
Payer: COMMERCIAL

## 2021-04-09 DIAGNOSIS — Z96.652 S/P TKR (TOTAL KNEE REPLACEMENT) USING CEMENT, LEFT: ICD-10-CM

## 2021-04-09 PROCEDURE — 97140 MANUAL THERAPY 1/> REGIONS: CPT | Mod: GP | Performed by: PHYSICAL THERAPIST

## 2021-04-09 PROCEDURE — 97110 THERAPEUTIC EXERCISES: CPT | Mod: GP | Performed by: PHYSICAL THERAPIST

## 2021-04-22 ENCOUNTER — THERAPY VISIT (OUTPATIENT)
Dept: PHYSICAL THERAPY | Facility: CLINIC | Age: 61
End: 2021-04-22
Payer: COMMERCIAL

## 2021-04-22 DIAGNOSIS — Z96.652 S/P TKR (TOTAL KNEE REPLACEMENT) USING CEMENT, LEFT: ICD-10-CM

## 2021-04-22 PROCEDURE — 97110 THERAPEUTIC EXERCISES: CPT | Mod: GP | Performed by: PHYSICAL THERAPIST

## 2021-04-27 ENCOUNTER — IMMUNIZATION (OUTPATIENT)
Dept: NURSING | Facility: CLINIC | Age: 61
End: 2021-04-27
Payer: COMMERCIAL

## 2021-04-27 PROCEDURE — 0001A PR COVID VAC PFIZER DIL RECON 30 MCG/0.3 ML IM: CPT

## 2021-04-27 PROCEDURE — 91300 PR COVID VAC PFIZER DIL RECON 30 MCG/0.3 ML IM: CPT

## 2021-04-29 ENCOUNTER — THERAPY VISIT (OUTPATIENT)
Dept: PHYSICAL THERAPY | Facility: CLINIC | Age: 61
End: 2021-04-29
Payer: COMMERCIAL

## 2021-04-29 ENCOUNTER — TRANSFERRED RECORDS (OUTPATIENT)
Dept: HEALTH INFORMATION MANAGEMENT | Facility: CLINIC | Age: 61
End: 2021-04-29

## 2021-04-29 DIAGNOSIS — Z96.652 S/P TKR (TOTAL KNEE REPLACEMENT) USING CEMENT, LEFT: ICD-10-CM

## 2021-04-29 LAB — RETINOPATHY: NEGATIVE

## 2021-04-29 PROCEDURE — 97140 MANUAL THERAPY 1/> REGIONS: CPT | Mod: GP | Performed by: PHYSICAL THERAPIST

## 2021-04-29 PROCEDURE — 97110 THERAPEUTIC EXERCISES: CPT | Mod: GP | Performed by: PHYSICAL THERAPIST

## 2021-04-30 ENCOUNTER — OFFICE VISIT (OUTPATIENT)
Dept: FAMILY MEDICINE | Facility: CLINIC | Age: 61
End: 2021-04-30
Payer: COMMERCIAL

## 2021-04-30 VITALS
BODY MASS INDEX: 29.56 KG/M2 | SYSTOLIC BLOOD PRESSURE: 122 MMHG | DIASTOLIC BLOOD PRESSURE: 78 MMHG | HEART RATE: 123 BPM | WEIGHT: 177.4 LBS | TEMPERATURE: 97.9 F | HEIGHT: 65 IN | OXYGEN SATURATION: 100 % | RESPIRATION RATE: 19 BRPM

## 2021-04-30 DIAGNOSIS — E11.9 TYPE 2 DIABETES MELLITUS WITHOUT COMPLICATION, WITHOUT LONG-TERM CURRENT USE OF INSULIN (H): Primary | ICD-10-CM

## 2021-04-30 DIAGNOSIS — E11.42 DIABETIC POLYNEUROPATHY ASSOCIATED WITH TYPE 2 DIABETES MELLITUS (H): ICD-10-CM

## 2021-04-30 DIAGNOSIS — E78.5 HYPERLIPIDEMIA LDL GOAL <70: ICD-10-CM

## 2021-04-30 PROCEDURE — 99214 OFFICE O/P EST MOD 30 MIN: CPT | Performed by: FAMILY MEDICINE

## 2021-04-30 ASSESSMENT — MIFFLIN-ST. JEOR: SCORE: 1531.56

## 2021-04-30 NOTE — NURSING NOTE
"/78   Pulse 123   Temp 97.9  F (36.6  C) (Oral)   Resp 19   Ht 1.651 m (5' 5\")   Wt 80.5 kg (177 lb 6.4 oz)   SpO2 100%   BMI 29.52 kg/m    Patient in for follow up on Dm2.  Alejandrina Sky CMA    "

## 2021-04-30 NOTE — PROGRESS NOTES
"Answers for HPI/ROS submitted by the patient on 4/15/2021   Chronic problems general questions HPI Form  Dietary sodium intake:: Yes      Assessment & Plan   Type 2 diabetes mellitus without complication, without long-term current use of insulin (H)  Controlled, tolerating meds and will continue.  Recommend recheck of A1c in about 6 months.  - empagliflozin (JARDIANCE) 10 MG TABS tablet  Dispense: 90 tablet; Refill: 1  - metFORMIN (GLUCOPHAGE) 500 MG tablet  Dispense: 360 tablet; Refill: 1    Diabetic polyneuropathy associated with type 2 diabetes mellitus (H)  Stable and good that is controlling his diabetes, no sores on feet    Hyperlipidemia LDL goal <70  Controlled - continue medication.      BMI:   Estimated body mass index is 29.52 kg/m  as calculated from the following:    Height as of this encounter: 1.651 m (5' 5\").    Weight as of this encounter: 80.5 kg (177 lb 6.4 oz).   Weight management plan: Discussed healthy diet and exercise guidelines      Return in about 6 months (around 10/30/2021) for wellness exam with fasting labs with Aureliano Mckeon MD.      Aureliano Mckeon MD      [address] [address]  [employer]   Office: [phone]       Silvestre Wilder is a 60 year old who presents for the following health issues     History of Present Illness       Diabetes:   He presents for follow up of diabetes.  He is checking home blood glucose four or more times daily. He checks blood glucose before and after meals.  Blood glucose is sometimes over 200 and never under 70. When his blood glucose is low, the patient is asymptomatic for confusion, blurred vision, lethargy and reports not feeling dizzy, shaky, or weak.  He has no concerns regarding his diabetes at this time.  He is having numbness in feet, excessive thirst and weight loss. The patient has not had a diabetic eye exam in the last 12 months.         He eats 0-1 servings of fruits and vegetables " "daily.He consumes 0 sweetened beverage(s) daily.He exercises with enough effort to increase his heart rate 9 or less minutes per day.  He exercises with enough effort to increase his heart rate 6 days per week.   He is taking medications regularly.     Question 4/15/2021  1:42 PM CDT - Filed by Patient   How often are you checking your blood sugars? Four or more times daily   Comments - checking blood sugars:    Average blood sugar result - am:   131   Average blood sugar result - noon: 142   Average blood sugar result - supper: 200   Average blood sugar result - bedtime: 130   Average blood sugar result - after meals (postprandial): 210   Which symptoms do you notice when your blood sugar is low? None   Comments: Other Symptoms of Low Blood Sugar / When do Symptoms Occur - (i.e. when blood sugars are below: ___)    Have you experienced any paresthesias (numbness or burning in feet) or sores?: No   Have you had a diabetic eye exam within the last year? No   Are you following a low fat/low cholesterol diet?   Fair   Are you taking a \"statin\" medication or niaspan? Yes   If you are taking a \"statin\" medication, have you been experiencing any muscle aches or other side effects? Yes   Are you taking other supplements or other medications to lower your cholesterol? No   How often are you checking your blood pressures? No   What are the results of your blood pressure checks? Good   Are you following a low salt diet? Yes   Outside of work, how many days during the week do you exercise and how many hours each time? 1   Do you have any problems taking medications regularly? No   Do you have any side effects from medication? Yes   Are you on a special diet and if so, what kind (i.e. regular, low salt, etc)? No   Have there been any changes to your medical or surgical history? Yes   Have there been any changes to your social history? (tobacco use, alcohol use, sexual activity) No   Have there been any changes in your " "allergies? No   Have there been any changes or updates to the medications you take? No   Do any of your medications need to be refilled? No   Please enter the name of the pharmacy you use (clicking on the icon below will allow you to type in your answer): [facility]   Do you have any additional concerns to address? No   Over the past 2 weeks, how often have you been bothered by any of the following problems?    Q1: Little interest or pleasure in doing things Several days   Q2: Feeling down, depressed or hopeless Several days   PHQ-2 Score (range: 0 - 6) 2     Lab Results   Component Value Date    A1C 6.2 03/15/2021    A1C 6.4 10/15/2020    A1C 11.0 07/31/2020          Constitutional, HEENT, cardiovascular, pulmonary, gi and gu systems are negative, except as otherwise noted.      Objective    /78   Pulse 123   Temp 97.9  F (36.6  C) (Oral)   Resp 19   Ht 1.651 m (5' 5\")   Wt 80.5 kg (177 lb 6.4 oz)   SpO2 100%   BMI 29.52 kg/m    Body mass index is 29.52 kg/m .  Physical Exam   GENERAL: healthy, alert and no distress  NECK: no adenopathy, no asymmetry, masses, or scars and thyroid normal to palpation  RESP: lungs clear to auscultation - no rales, rhonchi or wheezes  CV: regular rate and rhythm, normal S1 S2, no S3 or S4, no murmur, click or rub, no peripheral edema and peripheral pulses strong  ABDOMEN: soft, nontender, no hepatosplenomegaly, no masses and bowel sounds normal  MS: no gross musculoskeletal defects noted, no edema  Diabetic foot exam: normal DP and PT pulses, no trophic changes or ulcerative lesions and mild numbness of the toes otherwise normal sensory exam              "

## 2021-05-03 ENCOUNTER — OFFICE VISIT (OUTPATIENT)
Dept: ORTHOPEDICS | Facility: CLINIC | Age: 61
End: 2021-05-03
Payer: COMMERCIAL

## 2021-05-03 ENCOUNTER — ANCILLARY PROCEDURE (OUTPATIENT)
Dept: GENERAL RADIOLOGY | Facility: CLINIC | Age: 61
End: 2021-05-03
Attending: ORTHOPAEDIC SURGERY
Payer: COMMERCIAL

## 2021-05-03 DIAGNOSIS — Z47.89 ORTHOPEDIC AFTERCARE: Primary | ICD-10-CM

## 2021-05-03 DIAGNOSIS — Z47.89 ORTHOPEDIC AFTERCARE: ICD-10-CM

## 2021-05-03 PROCEDURE — 73562 X-RAY EXAM OF KNEE 3: CPT | Mod: LT | Performed by: ORTHOPAEDIC SURGERY

## 2021-05-03 PROCEDURE — 99024 POSTOP FOLLOW-UP VISIT: CPT | Performed by: ORTHOPAEDIC SURGERY

## 2021-05-03 NOTE — PROGRESS NOTES
Subjective:  Meño Sánchez is a 60 year old male who is seen in f/u up for left TKA, DOS: 3/23/21.  He is 6 weeks s/p left TKA.  He reports overall the knee improves each day. Patient ambulating with walker in the community.  He continues to work with Physical Therapy. Not currently taking any medication for pain, has remaining oxycodone that he may use for more severe pain after Physical Therapy. Incision is well healed.   Current pain level:0/10, Worst pain level: 3/10      Objective:     He is able to walk very well with a walker  Without the walker he is little bit slow and with a slight limp  Clinically his varus alignment has improved significantly  The left knee alignment is neutral now  Patellofemoral tracking is intact  He has a 0 to 100 degrees of flexion    Imaging studies:   Unremarkable postop        Assessment:     Doing very well from left total knee arthroplasty, 6 weeks from the surgery      Plan:   Continue with exercises which we went over once again  Deep tissue scar tissue mobilization  Follow-up 1 year postop          Shantanu Washburn MD  Dept. Orthopedic Surgery  [facility]       Disclaimer: This note consists of symbols derived from keyboarding, dictation and/or voice recognition software. As a result, there may be errors in the script that have gone undetected. Please consider this when interpreting information found in this chart.

## 2021-05-03 NOTE — LETTER
5/3/2021         RE: Meño Sánchez  [address] [place] [address]        Dear Colleague,    Thank you for referring your patient, Meño Sánchez, to the [facility] [place]. Please see a copy of my visit note below.    Subjective:  Meño Sánchez is a 60 year old male who is seen in f/u up for left TKA, DOS: 3/23/21.  He is 6 weeks s/p left TKA.  He reports overall the knee improves each day. Patient ambulating with walker in the community.  He continues to work with Physical Therapy. Not currently taking any medication for pain, has remaining oxycodone that he may use for more severe pain after Physical Therapy. Incision is well healed.   Current pain level:0/10, Worst pain level: 3/10      Objective:     He is able to walk very well with a walker  Without the walker he is little bit slow and with a slight limp  Clinically his varus alignment has improved significantly  The left knee alignment is neutral now  Patellofemoral tracking is intact  He has a 0 to 100 degrees of flexion    Imaging studies:   Unremarkable postop        Assessment:     Doing very well from left total knee arthroplasty, 6 weeks from the surgery      Plan:   Continue with exercises which we went over once again  Deep tissue scar tissue mobilization  Follow-up 1 year postop          Shantanu Washburn MD  Dept. Orthopedic Surgery  [facility]       Disclaimer: This note consists of symbols derived from keyboarding, dictation and/or voice recognition software. As a result, there may be errors in the script that have gone undetected. Please consider this when interpreting information found in this chart.        Again, thank you for allowing me to participate in the care of your patient.        Sincerely,        Shantanu Washburn MD

## 2021-05-03 NOTE — PATIENT INSTRUCTIONS
If there are no new issues, he can come back to clinic for 1 year postop visit  Continue with home exercises

## 2021-05-18 ENCOUNTER — IMMUNIZATION (OUTPATIENT)
Dept: NURSING | Facility: CLINIC | Age: 61
End: 2021-05-18
Attending: INTERNAL MEDICINE
Payer: COMMERCIAL

## 2021-05-18 PROCEDURE — 0002A PR COVID VAC PFIZER DIL RECON 30 MCG/0.3 ML IM: CPT

## 2021-05-18 PROCEDURE — 91300 PR COVID VAC PFIZER DIL RECON 30 MCG/0.3 ML IM: CPT

## 2021-05-20 ENCOUNTER — THERAPY VISIT (OUTPATIENT)
Dept: PHYSICAL THERAPY | Facility: CLINIC | Age: 61
End: 2021-05-20
Payer: COMMERCIAL

## 2021-05-20 DIAGNOSIS — Z96.652 S/P TKR (TOTAL KNEE REPLACEMENT) USING CEMENT, LEFT: ICD-10-CM

## 2021-05-20 PROCEDURE — 97110 THERAPEUTIC EXERCISES: CPT | Mod: GP | Performed by: PHYSICAL THERAPIST

## 2021-07-10 ENCOUNTER — HEALTH MAINTENANCE LETTER (OUTPATIENT)
Age: 61
End: 2021-07-10

## 2021-09-04 ENCOUNTER — HEALTH MAINTENANCE LETTER (OUTPATIENT)
Age: 61
End: 2021-09-04

## 2021-10-16 DIAGNOSIS — E78.5 HYPERLIPIDEMIA LDL GOAL <70: ICD-10-CM

## 2021-10-20 RX ORDER — ROSUVASTATIN CALCIUM 5 MG/1
TABLET, COATED ORAL
Qty: 90 TABLET | Refills: 0 | Status: SHIPPED | OUTPATIENT
Start: 2021-10-20 | End: 2021-10-27

## 2021-10-20 NOTE — TELEPHONE ENCOUNTER
Patient due for fasting office visit- 90 days supply given.  Routing to team to schedule appointment     Charis MCCONNELL RN  [facility]  [phone]

## 2021-10-28 ENCOUNTER — OFFICE VISIT (OUTPATIENT)
Dept: FAMILY MEDICINE | Facility: CLINIC | Age: 61
End: 2021-10-28
Payer: COMMERCIAL

## 2021-10-28 VITALS
TEMPERATURE: 98.1 F | HEIGHT: 65 IN | OXYGEN SATURATION: 98 % | WEIGHT: 190.4 LBS | HEART RATE: 111 BPM | SYSTOLIC BLOOD PRESSURE: 122 MMHG | BODY MASS INDEX: 31.72 KG/M2 | DIASTOLIC BLOOD PRESSURE: 80 MMHG | RESPIRATION RATE: 20 BRPM

## 2021-10-28 DIAGNOSIS — N52.9 ERECTILE DYSFUNCTION, UNSPECIFIED ERECTILE DYSFUNCTION TYPE: ICD-10-CM

## 2021-10-28 DIAGNOSIS — E78.5 HYPERLIPIDEMIA LDL GOAL <70: ICD-10-CM

## 2021-10-28 DIAGNOSIS — Z00.00 ROUTINE GENERAL MEDICAL EXAMINATION AT A HEALTH CARE FACILITY: Primary | ICD-10-CM

## 2021-10-28 DIAGNOSIS — E11.9 TYPE 2 DIABETES MELLITUS WITHOUT COMPLICATION, WITHOUT LONG-TERM CURRENT USE OF INSULIN (H): ICD-10-CM

## 2021-10-28 DIAGNOSIS — Z13.0 SCREENING, DEFICIENCY ANEMIA, IRON: ICD-10-CM

## 2021-10-28 DIAGNOSIS — G47.09 OTHER INSOMNIA: ICD-10-CM

## 2021-10-28 DIAGNOSIS — Z12.5 SCREENING FOR PROSTATE CANCER: ICD-10-CM

## 2021-10-28 LAB
ERYTHROCYTE [DISTWIDTH] IN BLOOD BY AUTOMATED COUNT: 13 % (ref 10–15)
HBA1C MFR BLD: 6.2 % (ref 0–5.6)
HCT VFR BLD AUTO: 48.1 % (ref 40–53)
HGB BLD-MCNC: 16.5 G/DL (ref 13.3–17.7)
MCH RBC QN AUTO: 34.8 PG (ref 26.5–33)
MCHC RBC AUTO-ENTMCNC: 34.3 G/DL (ref 31.5–36.5)
MCV RBC AUTO: 102 FL (ref 78–100)
PLATELET # BLD AUTO: 251 10E3/UL (ref 150–450)
RBC # BLD AUTO: 4.74 10E6/UL (ref 4.4–5.9)
WBC # BLD AUTO: 5.9 10E3/UL (ref 4–11)

## 2021-10-28 PROCEDURE — 90471 IMMUNIZATION ADMIN: CPT | Performed by: FAMILY MEDICINE

## 2021-10-28 PROCEDURE — 90682 RIV4 VACC RECOMBINANT DNA IM: CPT | Performed by: FAMILY MEDICINE

## 2021-10-28 PROCEDURE — 82043 UR ALBUMIN QUANTITATIVE: CPT | Performed by: FAMILY MEDICINE

## 2021-10-28 PROCEDURE — G0103 PSA SCREENING: HCPCS | Performed by: FAMILY MEDICINE

## 2021-10-28 PROCEDURE — 83036 HEMOGLOBIN GLYCOSYLATED A1C: CPT | Performed by: FAMILY MEDICINE

## 2021-10-28 PROCEDURE — 85027 COMPLETE CBC AUTOMATED: CPT | Performed by: FAMILY MEDICINE

## 2021-10-28 PROCEDURE — 80061 LIPID PANEL: CPT | Performed by: FAMILY MEDICINE

## 2021-10-28 PROCEDURE — 80053 COMPREHEN METABOLIC PANEL: CPT | Performed by: FAMILY MEDICINE

## 2021-10-28 PROCEDURE — 99396 PREV VISIT EST AGE 40-64: CPT | Mod: 25 | Performed by: FAMILY MEDICINE

## 2021-10-28 PROCEDURE — 36415 COLL VENOUS BLD VENIPUNCTURE: CPT | Performed by: FAMILY MEDICINE

## 2021-10-28 RX ORDER — BIOTIN 10000 MCG
CAPSULE ORAL
COMMUNITY
End: 2022-08-15

## 2021-10-28 RX ORDER — HYDROXYZINE PAMOATE 25 MG/1
25-50 CAPSULE ORAL
Qty: 60 CAPSULE | Refills: 3 | Status: SHIPPED | OUTPATIENT
Start: 2021-10-28

## 2021-10-28 RX ORDER — ROSUVASTATIN CALCIUM 5 MG/1
5 TABLET, COATED ORAL DAILY
Qty: 90 TABLET | Refills: 3 | Status: SHIPPED | OUTPATIENT
Start: 2021-10-28 | End: 2022-08-15

## 2021-10-28 RX ORDER — SILDENAFIL 50 MG/1
50 TABLET, FILM COATED ORAL DAILY PRN
Qty: 30 TABLET | Refills: 11 | Status: SHIPPED | OUTPATIENT
Start: 2021-10-28

## 2021-10-28 ASSESSMENT — ENCOUNTER SYMPTOMS
HEMATURIA: 0
ABDOMINAL PAIN: 0
JOINT SWELLING: 0
WEAKNESS: 0
DYSURIA: 0
FREQUENCY: 0
DIARRHEA: 0
PALPITATIONS: 0
CONSTIPATION: 0
SORE THROAT: 0
SHORTNESS OF BREATH: 0
HEADACHES: 0
EYE PAIN: 0
NAUSEA: 0
CHILLS: 0
HEMATOCHEZIA: 0
COUGH: 1
FEVER: 0
HEARTBURN: 0
DIZZINESS: 0
MYALGIAS: 1
NERVOUS/ANXIOUS: 1
ARTHRALGIAS: 1
PARESTHESIAS: 0

## 2021-10-28 ASSESSMENT — MIFFLIN-ST. JEOR: SCORE: 1585.53

## 2021-10-28 NOTE — PROGRESS NOTES
SUBJECTIVE:   CC: Meño Sánchez is an 61 year old male who presents for preventative health visit.   Patient has been advised of split billing requirements and indicates understanding: Yes  Healthy Habits:     Getting at least 3 servings of Calcium per day:  Yes    Bi-annual eye exam:  Yes    Dental care twice a year:  Yes    Sleep apnea or symptoms of sleep apnea:  Daytime drowsiness    Diet:  Diabetic    Frequency of exercise:  4-5 days/week    Duration of exercise:  30-45 minutes    Taking medications regularly:  Yes    Medication side effects:  Muscle aches    PHQ-2 Total Score: 0    Additional concerns today:  No    Diabetes Follow-up  How often are you checking your blood sugar? One time daily  What time of day are you checking your blood sugars (select all that apply)?  Before and after meals  Have you had any blood sugars above 200?  No  Have you had any blood sugars below 70?  No    What symptoms do you notice when your blood sugar is low?  None and Not applicable    What concerns do you have today about your diabetes? None     Do you have any of these symptoms? (Select all that apply)  Numbness in feet      BP Readings from Last 2 Encounters:   10/28/21 122/80   04/30/21 122/78     Hemoglobin A1C (%)   Date Value   10/28/2021 6.2 (H)   03/15/2021 6.2 (H)   10/15/2020 6.4 (H)     LDL Cholesterol Calculated (mg/dL)   Date Value   10/15/2020 33   03/26/2004 95         Hyperlipidemia Follow-Up    Are you regularly taking any medication or supplement to lower your cholesterol?   Yes- Rosuvastatin    Are you having muscle aches or other side effects that you think could be caused by your cholesterol lowering medication?  Yes- everywhere.  Bothersome upon standing    Today's PHQ-2 Score:   PHQ-2 ( 1999 [employer]) 10/28/2021   Q1: Little interest or pleasure in doing things 0   Q2: Feeling down, depressed or hopeless 0   PHQ-2 Score 0   Q1: Little interest or pleasure in doing things Not at all   Q2: Feeling down,  depressed or hopeless Not at all   PHQ-2 Score 0     Abuse: Current or Past(Physical, Sexual or Emotional)- No  Do you feel safe in your environment? Yes      Social History     Tobacco Use     Smoking status: Former Smoker     Packs/day: 0.20     Quit date:      Years since quittin.8     Smokeless tobacco: Former User     Quit date:      Tobacco comment: 15 cig per week   Substance Use Topics     Alcohol use: Yes     Comment: 2 / beers qd or 2-3 glsses of wine       Alcohol Use 10/28/2021   Prescreen: >3 drinks/day or >7 drinks/week? No   AUDIT SCORE  -       Last PSA:   PSA   Date Value Ref Range Status   10/15/2020 0.24 0 - 4 ug/L Final     Comment:     Assay Method:  Chemiluminescence using Siemens Vista analyzer       Reviewed orders with patient. Reviewed health maintenance and updated orders accordingly - Yes      Reviewed and updated as needed this visit by clinical staff  Tobacco  Allergies  Meds  Problems  Med Hx  Surg Hx  Fam Hx          Reviewed and updated as needed this visit by Provider       Surg Hx            Review of Systems   Constitutional: Negative for chills and fever.   HENT: Negative for congestion, ear pain, hearing loss and sore throat.    Eyes: Negative for pain and visual disturbance.   Respiratory: Positive for cough. Negative for shortness of breath.    Cardiovascular: Negative for chest pain, palpitations and peripheral edema.   Gastrointestinal: Negative for abdominal pain, constipation, diarrhea, heartburn, hematochezia and nausea.   Genitourinary: Positive for genital sores and impotence. Negative for discharge, dysuria, frequency, hematuria and urgency.   Musculoskeletal: Positive for arthralgias and myalgias. Negative for joint swelling.   Skin: Negative for rash.   Neurological: Negative for dizziness, weakness, headaches and paresthesias.   Psychiatric/Behavioral: Negative for mood changes. The patient is nervous/anxious.        OBJECTIVE:   /80    "Pulse 111   Temp 98.1  F (36.7  C) (Tympanic)   Resp 20   Ht 1.651 m (5' 5\")   Wt 86.4 kg (190 lb 6.4 oz)   SpO2 98%   BMI 31.68 kg/m    EXAM:  GENERAL: healthy, alert and no distress  EYES: Eyes grossly normal to inspection, PERRL and conjunctivae and sclerae normal  HENT: ear canals and TM's normal, nose and mouth without ulcers or lesions  NECK: no adenopathy, no asymmetry, masses, or scars and thyroid normal to palpation  RESP: lungs clear to auscultation - no rales, rhonchi or wheezes  BREAST: normal without masses, tenderness or nipple discharge and no palpable axillary masses or adenopathy  CV: regular rate and rhythm, normal S1 S2, no S3 or S4, no murmur, click or rub, no peripheral edema and peripheral pulses strong  ABDOMEN: soft, nontender, no hepatosplenomegaly, no masses and bowel sounds normal   (male): normal male genitalia without lesions or urethral discharge, no hernia  MS: no gross musculoskeletal defects noted, no edema  SKIN: no suspicious lesions or rashes  NEURO: Normal strength and tone, mentation intact and speech normal  PSYCH: mentation appears normal, affect normal/bright  LYMPH: no cervical, supraclavicular, axillary, or inguinal adenopathy    Diabetic foot exam: normal DP and PT pulses, no trophic changes or ulcerative lesions and normal sensory exam  Results for orders placed or performed in visit on 10/28/21   CBC with Platelets       Status: Abnormal   Result Value Ref Range    WBC Count 5.9 4.0 - 11.0 10e3/uL    RBC Count 4.74 4.40 - 5.90 10e6/uL    Hemoglobin 16.5 13.3 - 17.7 g/dL    Hematocrit 48.1 40.0 - 53.0 %     (H) 78 - 100 fL    MCH 34.8 (H) 26.5 - 33.0 pg    MCHC 34.3 31.5 - 36.5 g/dL    RDW 13.0 10.0 - 15.0 %    Platelet Count 251 150 - 450 10e3/uL   Hemoglobin A1c     Status: Abnormal   Result Value Ref Range    Hemoglobin A1C 6.2 (H) 0.0 - 5.6 %         ASSESSMENT/PLAN:   Routine general medical examination at a health care facility      Hyperlipidemia LDL " "goal <70  Controlled - continue medication(s).  - rosuvastatin (CRESTOR) 5 MG tablet  Dispense: 90 tablet; Refill: 3  - Lipid panel reflex to direct LDL Fasting  - Lipid panel reflex to direct LDL Fasting    Type 2 diabetes mellitus without complication, without long-term current use of insulin (H)  Controlled - continue medication(s).  - empagliflozin (JARDIANCE) 10 MG TABS tablet  Dispense: 90 tablet; Refill: 1  - metFORMIN (GLUCOPHAGE) 500 MG tablet  Dispense: 360 tablet; Refill: 1  - COMPREHENSIVE METABOLIC PANEL  - Albumin Random Urine Quantitative with Creat Ratio  - Hemoglobin A1c  - COMPREHENSIVE METABOLIC PANEL  - Albumin Random Urine Quantitative with Creat Ratio  - Hemoglobin A1c    Erectile dysfunction, unspecified erectile dysfunction type  New diagnosis, risks and benefits of meds discussed, will try:  - sildenafil (VIAGRA) 50 MG tablet  Dispense: 30 tablet; Refill: 11    Other insomnia  Ongoing history of occasional use of back:  - hydrOXYzine (VISTARIL) 25 MG capsule  Dispense: 60 capsule; Refill: 3    Screening for prostate cancer  - PROSTATE SPEC ANTIGEN SCREEN  - PROSTATE SPEC ANTIGEN SCREEN    Screening, deficiency anemia, iron  - CBC with Platelets    - CBC with Platelets        COUNSELING:  Reviewed preventive health counseling, as reflected in patient instructions      Estimated body mass index is 31.68 kg/m  as calculated from the following:    Height as of this encounter: 1.651 m (5' 5\").    Weight as of this encounter: 86.4 kg (190 lb 6.4 oz).  Weight management plan: Discussed healthy diet and exercise guidelines     reports that he quit smoking about 16 years ago. He smoked 0.20 packs per day. He quit smokeless tobacco use about 11 years ago.      Return in about 6 months (around 4/28/2022) for medication recheck, with a video visit, or, in person, with Dr Aureliano Mckeon.         Aureliano Mckeon MD     [address] " [address]  [email or web address]     Office: [phone]

## 2021-10-29 LAB
ALBUMIN SERPL-MCNC: 3.9 G/DL (ref 3.4–5)
ALP SERPL-CCNC: 74 U/L (ref 40–150)
ALT SERPL W P-5'-P-CCNC: 36 U/L (ref 0–70)
ANION GAP SERPL CALCULATED.3IONS-SCNC: 5 MMOL/L (ref 3–14)
AST SERPL W P-5'-P-CCNC: 21 U/L (ref 0–45)
BILIRUB SERPL-MCNC: 1 MG/DL (ref 0.2–1.3)
BUN SERPL-MCNC: 18 MG/DL (ref 7–30)
CALCIUM SERPL-MCNC: 9.7 MG/DL (ref 8.5–10.1)
CHLORIDE BLD-SCNC: 106 MMOL/L (ref 94–109)
CHOLEST SERPL-MCNC: 122 MG/DL
CO2 SERPL-SCNC: 26 MMOL/L (ref 20–32)
CREAT SERPL-MCNC: 0.95 MG/DL (ref 0.66–1.25)
FASTING STATUS PATIENT QL REPORTED: NO
GFR SERPL CREATININE-BSD FRML MDRD: 85 ML/MIN/1.73M2
GLUCOSE BLD-MCNC: 157 MG/DL (ref 70–99)
HDLC SERPL-MCNC: 48 MG/DL
LDLC SERPL CALC-MCNC: 40 MG/DL
NONHDLC SERPL-MCNC: 74 MG/DL
POTASSIUM BLD-SCNC: 4.9 MMOL/L (ref 3.4–5.3)
PROT SERPL-MCNC: 8.1 G/DL (ref 6.8–8.8)
PSA SERPL-MCNC: 0.34 UG/L (ref 0–4)
SODIUM SERPL-SCNC: 137 MMOL/L (ref 133–144)
TRIGL SERPL-MCNC: 170 MG/DL

## 2021-10-30 LAB
CREAT UR-MCNC: 50 MG/DL
MICROALBUMIN UR-MCNC: 11 MG/L
MICROALBUMIN/CREAT UR: 22 MG/G CR (ref 0–17)

## 2021-11-01 NOTE — RESULT ENCOUNTER NOTE
Dear Meño,    Here is a summary of your recent test results:  -Normal red blood cell (hgb) levels, normal white blood cell count and normal platelet levels.  -PSA (prostate specific antigen) test is normal.  This indicates a low likelihood of prostate cancer.  ADVISE: rechecking this in 1 year.  -Cholesterol levels are at your goal levels.  ADVISE: continuing your medication, a regular exercise program with at least 150 minutes of aerobic exercise per week, and eating a low saturated fat/low carbohydrate diet.  Also, you should recheck this fasting cholesterol panel in 12 months.  -Liver and gallbladder tests (ALT,AST, Alk phos,bilirubin) are normal.  -Kidney function (GFR) is normal.  -Sodium is normal.  -Potassium is normal.  -Calcium is normal.  -Glucose is elevated due to your diabetes.  -A1C (test of diabetes control the last 2-3 months) is at your goal. Please recheck your A1C test in 3 months.   -Microalbumin (urine protein) level is elevated. This is suggestive of early damage to your kidneys from diabetes.  ADVISE: avoiding anti-inflamatory agents such as ibuprofen (Advil, Motrin) or naproxen (Aleve) as much as possible, keeping your blood pressure in a normal range.  Also, this should be rechecked in 1 year.     For additional lab test information, labtestsonline.org is an excellent reference.    In addition, here is a list of due or overdue Health Maintenance reminders:  Zoster (Shingles) Vaccine(1 of 2) Never done    Please call us at [phone] (or use [facility]) to address the above recommendations if needed.           Thank you very much for trusting me and [facility].     Have a peaceful day.    Healthy regards,  Aureliano Mckeon MD

## 2021-11-08 PROBLEM — Z96.652 S/P TKR (TOTAL KNEE REPLACEMENT) USING CEMENT, LEFT: Status: RESOLVED | Noted: 2021-03-23 | Resolved: 2021-11-08

## 2021-11-08 NOTE — PROGRESS NOTES
DISCHARGE REPORT    Updated as of November 8, 2021  Progress reporting period is from Mar 29, 2021 to May 20, 2021.       SUBJECTIVE  Subjective changes noted by patient:  Unknown. Patient has failed to return to clinic.    Current pain level is unknown. Patient has failed to return to clinic.  .     Initial Pain level: 10/10.   Changes in function:  Unknown. Patient has failed to return to clinic.  Adverse reaction to treatment or activity: Unknown. Patient has failed to return to clinic.    OBJECTIVE  Changes noted in objective findings:  Patient has failed to return to therapy so current objective findings are unknown.    ASSESSMENT/PLAN  Updated problem list and treatment plan: Diagnosis 1:  S/P Left Total Arthroplasty  STG/LTGs have been met or progress has been made towards goals:  Unknown. Patient has failed to return to clinic.  Assessment of Progress: The patient has not returned to therapy. Current status is unknown.  Self Management Plans:  Patient has been instructed in a home treatment program.  Patient continues to require the following intervention to meet STG and LTG's:  Unknown. Patient has failed to return to clinic.    Recommendations:  Unable to make an accurate recommendation at this time. Patient has failed to return to clinic.    Please refer to the daily flowsheet for treatment today, total treatment time and time spent performing 1:1 timed codes.

## 2022-01-31 ENCOUNTER — VIRTUAL VISIT (OUTPATIENT)
Dept: URGENT CARE | Facility: CLINIC | Age: 62
End: 2022-01-31
Payer: COMMERCIAL

## 2022-01-31 DIAGNOSIS — S06.0X0A CONCUSSION WITHOUT LOSS OF CONSCIOUSNESS, INITIAL ENCOUNTER: ICD-10-CM

## 2022-01-31 DIAGNOSIS — M54.2 NECK PAIN: ICD-10-CM
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
## 2022-01-31 PROCEDURE — 99213 OFFICE O/P EST LOW 20 MIN: CPT | Mod: 95
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
## 2022-02-19 ENCOUNTER — HEALTH MAINTENANCE LETTER (OUTPATIENT)
Age: 62
End: 2022-02-19

## 2022-03-14 ENCOUNTER — VIRTUAL VISIT (OUTPATIENT)
Dept: FAMILY MEDICINE | Facility: CLINIC | Age: 62
End: 2022-03-14
Payer: COMMERCIAL

## 2022-03-14 DIAGNOSIS — R05.9 COUGH: Primary | ICD-10-CM

## 2022-03-14 PROCEDURE — 99214 OFFICE O/P EST MOD 30 MIN: CPT | Mod: 95 | Performed by: FAMILY MEDICINE

## 2022-03-14 NOTE — PROGRESS NOTES
Meño is a 61 year old who is being evaluated via a billable video visit.      How would you like to obtain your AVS? MyChart    Will anyone else be joining your video visit? No      Video Start Time: 3:27 PM    Assessment & Plan     Cough and sore throat  Await CXR & COVID results  Consider a trial of medrol dose corey  - XR Chest 2 Views  - Symptomatic; Unknown COVID-19 Virus (Coronavirus) by PCR      Deacon Kauffman MD  [facility]    Subjective   Meño is a 61 year old who presents for the following health issues     HPI     Sore throat and cough x 1 month  SOB and wheezing  Was seen at [facility] clinic 1 wk and was given albuterol and cough pills  Negative strep  No fever, no runny nose, no PND  No exposure to COVID  Not smoker  No h/o asthma  No allergies or GERD  Vaccinated for COVID   Has DM and takes Jardiance and metformin  No one with similar symptoms        Objective           Vitals:  No vitals were obtained today due to virtual visit.    Physical Exam   Constitutional: Patient is oriented to person, place, and time. Patient appears well-developed and well-nourished. No distress.   Head: Normocephalic and atraumatic.   Right Ear: External ear normal.   Left Ear: External ear normal.   Eyes: Conjunctivae and EOM are normal. Right eye exhibits no discharge. Left eye exhibits no discharge. No scleral icterus.   Neurological: Patient is alert and oriented to person, place, and time.  Skin: No rash noted. Patient is not diaphoretic. No erythema. No pallor.      Video-Visit Details    Type of service:  Video Visit    Video End Time:3:40pm    Originating Location (pt. Location): Home    Distant Location (provider location):  [facility]     Platform used for Video Visit: [employer]

## 2022-03-15 ENCOUNTER — LAB (OUTPATIENT)
Dept: FAMILY MEDICINE | Facility: CLINIC | Age: 62
End: 2022-03-15
Attending: FAMILY MEDICINE
Payer: COMMERCIAL

## 2022-03-15 DIAGNOSIS — R05.9 COUGH: Primary | ICD-10-CM

## 2022-03-15 DIAGNOSIS — R05.9 COUGH: ICD-10-CM

## 2022-03-15 LAB — SARS-COV-2 RNA RESP QL NAA+PROBE: NEGATIVE

## 2022-03-15 PROCEDURE — U0003 INFECTIOUS AGENT DETECTION BY NUCLEIC ACID (DNA OR RNA); SEVERE ACUTE RESPIRATORY SYNDROME CORONAVIRUS 2 (SARS-COV-2) (CORONAVIRUS DISEASE [COVID-19]), AMPLIFIED PROBE TECHNIQUE, MAKING USE OF HIGH THROUGHPUT TECHNOLOGIES AS DESCRIBED BY CMS-2020-01-R: HCPCS

## 2022-03-15 PROCEDURE — U0005 INFEC AGEN DETEC AMPLI PROBE: HCPCS

## 2022-03-15 RX ORDER — METHYLPREDNISOLONE 4 MG
TABLET, DOSE PACK ORAL
Qty: 21 TABLET | Refills: 0 | Status: SHIPPED | OUTPATIENT
Start: 2022-03-15 | End: 2022-08-15

## 2022-03-16 ENCOUNTER — HOSPITAL ENCOUNTER (OUTPATIENT)
Dept: RADIOLOGY | Facility: CLINIC | Age: 62
Discharge: HOME | End: 2022-03-16
Attending: FAMILY MEDICINE | Admitting: FAMILY MEDICINE
Payer: COMMERCIAL

## 2022-03-16 DIAGNOSIS — R05.9 COUGH: ICD-10-CM

## 2022-03-16 PROCEDURE — 71046 X-RAY EXAM CHEST 2 VIEWS: CPT

## 2022-03-22 ENCOUNTER — MYC MEDICAL ADVICE (OUTPATIENT)
Dept: FAMILY MEDICINE | Facility: CLINIC | Age: 62
End: 2022-03-22
Payer: COMMERCIAL

## 2022-03-22 DIAGNOSIS — J20.9 ACUTE BRONCHITIS, UNSPECIFIED ORGANISM: Primary | ICD-10-CM

## 2022-03-24 RX ORDER — AZITHROMYCIN 250 MG/1
TABLET, FILM COATED ORAL
Qty: 6 TABLET | Refills: 0 | Status: SHIPPED | OUTPATIENT
Start: 2022-03-24 | End: 2022-03-29

## 2022-06-11 ENCOUNTER — HEALTH MAINTENANCE LETTER (OUTPATIENT)
Age: 62
End: 2022-06-11

## 2022-08-15 ENCOUNTER — OFFICE VISIT (OUTPATIENT)
Dept: FAMILY MEDICINE | Facility: CLINIC | Age: 62
End: 2022-08-15
Payer: COMMERCIAL

## 2022-08-15 VITALS
DIASTOLIC BLOOD PRESSURE: 90 MMHG | OXYGEN SATURATION: 98 % | WEIGHT: 188 LBS | SYSTOLIC BLOOD PRESSURE: 139 MMHG | HEART RATE: 74 BPM | BODY MASS INDEX: 31.28 KG/M2

## 2022-08-15 DIAGNOSIS — E11.9 TYPE 2 DIABETES MELLITUS WITHOUT COMPLICATION, WITHOUT LONG-TERM CURRENT USE OF INSULIN (H): ICD-10-CM

## 2022-08-15 DIAGNOSIS — E11.42 DIABETIC POLYNEUROPATHY ASSOCIATED WITH TYPE 2 DIABETES MELLITUS (H): Primary | ICD-10-CM

## 2022-08-15 DIAGNOSIS — E78.5 HYPERLIPIDEMIA LDL GOAL <70: ICD-10-CM

## 2022-08-15 DIAGNOSIS — G47.33 OBSTRUCTIVE SLEEP APNEA SYNDROME: ICD-10-CM

## 2022-08-15 PROCEDURE — 90471 IMMUNIZATION ADMIN: CPT | Performed by: NURSE PRACTITIONER

## 2022-08-15 PROCEDURE — 90677 PCV20 VACCINE IM: CPT | Performed by: NURSE PRACTITIONER

## 2022-08-15 PROCEDURE — 99214 OFFICE O/P EST MOD 30 MIN: CPT | Mod: 25 | Performed by: NURSE PRACTITIONER

## 2022-08-15 RX ORDER — ROSUVASTATIN CALCIUM 5 MG/1
5 TABLET, COATED ORAL DAILY
Qty: 90 TABLET | Refills: 3
Start: 2022-08-15 | End: 2022-11-14

## 2022-08-15 NOTE — PROGRESS NOTES
"  Assessment & Plan     Diabetic polyneuropathy associated with type 2 diabetes mellitus (H)    - HEMOGLOBIN A1C  - OPTOMETRY REFERRAL    Obstructive sleep apnea syndrome      Type 2 diabetes mellitus without complication, without long-term current use of insulin (H)    - metFORMIN (GLUCOPHAGE) 500 MG tablet  Dispense: 180 tablet; Refill: 3  - empagliflozin (JARDIANCE) 10 MG TABS tablet  Dispense: 90 tablet; Refill: 3    Hyperlipidemia LDL goal <70    - rosuvastatin (CRESTOR) 5 MG tablet  Dispense: 90 tablet; Refill: 3    -Was diagnosed with diabetes many years ago.  He last saw endocrinology back in 2016.  He has a kalina 2 and monitors his blood sugars and they have been under control. is last hemoglobin was under control at 6.2.  The hemoglobin A1c prior to 6.2 was also 6.2.  We discussed diet and exercise.  I reviewed his readings in the Kalina 2.  He will continue to monitor for any open sores on legs since he can get numbness and tingling to his toes.  I discussed ways to prevent vascular disease and he verbalized understanding.  We will continue to exercise  -Blood pressure is a little bit elevated today.  His prior blood pressure in October was within normal range.  We will continue to monitor this.  It appears that his weight is stable.   -I reviewed his lipids and they are stable.  He can continue Crestor.  -Per problem list it stating obstructive sleep apnea syndrome.  Patient stated that he never completed that study.  We will continue to monitor .  I discussed the benefits of diagnosing sleep apnea and offered to repeat the study and he declined .  I discussed conservative management to help prevent against sleep apnea today .-[id number]}     BMI:   Estimated body mass index is 31.28 kg/m  as calculated from the following:    Height as of 10/28/21: 1.651 m (5' 5\").    Weight as of this encounter: 85.3 kg (188 lb).   Weight management plan: Discussed healthy diet and exercise guidelines      Return in about 6 " months (around 2/15/2023) for with me, Follow up.    LILI Clark CNP  [facility]    Silvestre Wilder is a 62 year old presenting for the following health issues:  Diabetes      Was diagnosed with diabetes many years ago.  He last saw endocrinology back in 2016.  He has a vianney 2 and monitors his blood sugars and they have been under control.  He denied any hypoglycemic events or numbers.  He does get numbness and tingling to bilateral toes.  He stated the sensation can be constant.  He denied any open sores on his legs or feet.  He denied any problems taking his medications.  His last hemoglobin was under control at 6.2.  The hemoglobin A1c prior to 6.2 was also 6.2.  He denied any urinary changes or abdominal pains.  He tries to eat healthy and exercise at least 4 days a week.Per problem list it stating obstructive sleep apnea syndrome.  Patient stated that he never completed that study.  He stated that he sleeps alone and is not aware if he snores.  He denied any choking or gasping for air at night.  He denied morning headaches or drowsiness during the day.       History of Present Illness       Diabetes:   He presents for follow up of diabetes.  He is checking home blood glucose four or more times daily. He checks blood glucose before and after meals.  Blood glucose is sometimes over 200 and never under 70. When his blood glucose is low, the patient is asymptomatic for confusion, blurred vision, lethargy and reports not feeling dizzy, shaky, or weak.  He has no concerns regarding his diabetes at this time.  He is having numbness in feet. The patient has had a diabetic eye exam in the last 12 months.         He eats 2-3 servings of fruits and vegetables daily.He consumes 0 sweetened beverage(s) daily.He exercises with enough effort to increase his heart rate 20 to 29 minutes per day.  He exercises with enough effort to increase his heart rate 5 days per week.   He is  taking medications regularly.       Review of Systems   All other systems reviewed and are negative.           Objective    BP (!) 139/90   Pulse 74   Wt 85.3 kg (188 lb)   SpO2 98%   BMI 31.28 kg/m    Body mass index is 31.28 kg/m .  Physical Exam   GENERAL: healthy, alert and no distress  EYES: Eyes grossly normal to inspection, PERRL and conjunctivae and sclerae normal  HENT: ear canals and TM's normal, nose and mouth without ulcers or lesions  NECK: no adenopathy, no asymmetry, masses, or scars and thyroid normal to palpation  RESP: lungs clear to auscultation - no rales, rhonchi or wheezes  CV: regular rate and rhythm, normal S1 S2, no S3 or S4, no murmur, click or rub, no peripheral edema and peripheral pulses strong  ABDOMEN: soft, nontender, no hepatosplenomegaly, no masses and bowel sounds normal  MS: no gross musculoskeletal defects noted, no edema  SKIN: no suspicious lesions or rashes  NEURO: Normal strength and tone, mentation intact and speech normal  PSYCH: mentation appears normal, affect normal/bright    Lab on 03/15/2022   Component Date Value Ref Range Status     SARS CoV2 PCR 03/15/2022 Negative  Negative Final    NEGATIVE: SARS-CoV-2 (COVID-19) RNA not detected, presumed negative.                   .  ..

## 2022-09-22 ENCOUNTER — MYC MEDICAL ADVICE (OUTPATIENT)
Dept: FAMILY MEDICINE | Facility: CLINIC | Age: 62
End: 2022-09-22

## 2022-09-22 DIAGNOSIS — E11.9 TYPE 2 DIABETES MELLITUS WITHOUT COMPLICATION, WITHOUT LONG-TERM CURRENT USE OF INSULIN (H): ICD-10-CM

## 2022-09-22 RX ORDER — FLASH GLUCOSE SENSOR
KIT MISCELLANEOUS
Qty: 3 EACH | Refills: 11 | Status: SHIPPED | OUTPATIENT
Start: 2022-09-22 | End: 2022-10-04

## 2022-09-22 NOTE — TELEPHONE ENCOUNTER
Routing refill request to provider for review/approval because:  Drug not on the [facility] refill protocol     Requested Prescriptions   Pending Prescriptions Disp Refills    Continuous Blood Gluc Sensor (FREESTYLE CHAYA 14 DAY SENSOR) MIS 3 each 11     Sig: UES 1 DEVICE EVERY 14 DAYS        There is no refill protocol information for this order         Corazon Zavaleta RN, BSN  [facility]        
[FreeTextEntry1] : Dear Dr. TERESA ALFARO ,\par \par I had the pleasure of consulting on RANDALL RASHEEDA today.  Below is my note regarding the office visit today.\par \par Thank you so very much for allowing me to participate in RANDALL's  care.  Please don't hesitate to call me should any questions or issues arise .\par \par Sincerely, \par \par Keo\par \par Keo Slater MD, FACS, FSPU\par Chief, Pediatric Urology\par Professor of Urology and Pediatrics\par [employer]\par \par President, [employer] - [place] Section\par Past-President, [employer] Pediatric [employer]

## 2022-10-02 DIAGNOSIS — E11.9 TYPE 2 DIABETES MELLITUS WITHOUT COMPLICATION, WITHOUT LONG-TERM CURRENT USE OF INSULIN (H): ICD-10-CM

## 2022-10-03 NOTE — TELEPHONE ENCOUNTER
Routing refill request to provider for review/approval because:  Drug not on the [facility] refill protocol   Lola Hendrix RN, BSN  [facility]

## 2022-10-04 RX ORDER — FLASH GLUCOSE SENSOR
KIT MISCELLANEOUS
Qty: 6 EACH | Refills: 3 | Status: SHIPPED | OUTPATIENT
Start: 2022-10-04 | End: 2023-10-13

## 2022-10-16 ENCOUNTER — HEALTH MAINTENANCE LETTER (OUTPATIENT)
Age: 62
End: 2022-10-16

## 2022-11-04 DIAGNOSIS — E11.9 TYPE 2 DIABETES MELLITUS WITHOUT COMPLICATION, WITHOUT LONG-TERM CURRENT USE OF INSULIN (H): ICD-10-CM

## 2022-11-04 DIAGNOSIS — E78.5 HYPERLIPIDEMIA LDL GOAL <70: ICD-10-CM

## 2022-11-08 RX ORDER — ROSUVASTATIN CALCIUM 5 MG/1
TABLET, COATED ORAL
Qty: 90 TABLET | Refills: 3 | OUTPATIENT
Start: 2022-11-08

## 2022-11-08 RX ORDER — EMPAGLIFLOZIN 10 MG/1
TABLET, FILM COATED ORAL
Qty: 90 TABLET | Refills: 3 | OUTPATIENT
Start: 2022-11-08

## 2022-11-09 ENCOUNTER — MYC MEDICAL ADVICE (OUTPATIENT)
Dept: FAMILY MEDICINE | Facility: CLINIC | Age: 62
End: 2022-11-09

## 2022-11-09 DIAGNOSIS — E78.5 HYPERLIPIDEMIA LDL GOAL <70: ICD-10-CM

## 2022-11-09 DIAGNOSIS — E11.9 TYPE 2 DIABETES MELLITUS WITHOUT COMPLICATION, WITHOUT LONG-TERM CURRENT USE OF INSULIN (H): ICD-10-CM

## 2022-11-11 NOTE — TELEPHONE ENCOUNTER
"Routing refill request to provider for review/approval because:  Labs not current:      Last Written Prescription Date:  8/15/22 no print out  Last Fill Quantity: ,  # refills:    Last office visit provider:  8/15/22     Requested Prescriptions   Pending Prescriptions Disp Refills     metFORMIN (GLUCOPHAGE) 500 MG tablet 180 tablet 3     Sig: Take 1 tablet (500 mg) by mouth 2 times daily (with meals)       Biguanide Agents Failed - 11/10/2022 10:44 AM        Failed - Patient has documented A1c within the specified period of time.     If HgbA1C is 8 or greater, it needs to be on file within the past 3 months.  If less than 8, must be on file within the past 6 months.     Recent Labs   Lab Test 10/28/21  1354   A1C 6.2*             Failed - Patient's CR is NOT>1.4 OR Patient's EGFR is NOT<45 within past 12 mos.     Recent Labs   Lab Test 10/28/21  1354 03/23/21  0918   GFRESTIMATED 85 >90   GFRESTBLACK  --  >90       Recent Labs   Lab Test 10/28/21  1354   CR 0.95             Passed - Patient is age 10 or older        Passed - Patient does NOT have a diagnosis of CHF.        Passed - Medication is active on med list        Passed - Recent (6 mo) or future (30 days) visit within the authorizing provider's specialty     Patient had office visit in the last 6 months or has a visit in the next 30 days with authorizing provider or within the authorizing provider's specialty.  See \"Patient Info\" tab in inbasket, or \"Choose Columns\" in Meds & Orders section of the refill encounter.               rosuvastatin (CRESTOR) 5 MG tablet 90 tablet 3     Sig: Take 1 tablet (5 mg) by mouth daily       Statins Protocol Failed - 11/10/2022 10:44 AM        Failed - LDL on file in past 12 months     Recent Labs   Lab Test 10/28/21  1354   LDL 40             Passed - No abnormal creatine kinase in past 12 months     No lab results found.             Passed - Recent (12 mo) or future (30 days) visit within the authorizing provider's " "specialty     Patient has had an office visit with the authorizing provider or a provider within the authorizing providers department within the previous 12 mos or has a future within next 30 days. See \"Patient Info\" tab in inbasket, or \"Choose Columns\" in Meds & Orders section of the refill encounter.              Passed - Medication is active on med list        Passed - Patient is age 18 or older           empagliflozin (JARDIANCE) 10 MG TABS tablet 90 tablet 3     Sig: Take 1 tablet (10 mg) by mouth every morning       Sodium Glucose Co-Transport Inhibitor Agents Failed - 11/10/2022 10:44 AM        Failed - Patient has documented A1c within the specified period of time.     If HgbA1C is 8 or greater, it needs to be on file within the past 3 months.  If less than 8, must be on file within the past 6 months.     Recent Labs   Lab Test 10/28/21  1354   A1C 6.2*             Failed - No creatinine >1.4 or GFR <45 within the past 12 mos     Recent Labs   Lab Test 10/28/21  1354 03/23/21  0918   GFRESTIMATED 85 >90   GFRESTBLACK  --  >90       Recent Labs   Lab Test 10/28/21  1354   CR 0.95             Failed - Patient has documented normal Potassium within the last 12 mos.     Recent Labs   Lab Test 10/28/21  1354   POTASSIUM 4.9             Passed - Medication is active on med list        Passed - Patient is age 18 or older        Passed - Recent (6 mo) or future (30 days) visit within the authorizing provider's specialty     Patient had office visit in the last 6 months or has a visit in the next 30 days with authorizing provider or within the authorizing provider's specialty.  See \"Patient Info\" tab in inbasket, or \"Choose Columns\" in Meds & Orders section of the refill encounter.                 Juliana Harrison RN 11/11/22 3:49 PM  "

## 2022-11-14 RX ORDER — ROSUVASTATIN CALCIUM 5 MG/1
5 TABLET, COATED ORAL DAILY
Qty: 90 TABLET | Refills: 0 | Status: SHIPPED | OUTPATIENT
Start: 2022-11-14 | End: 2023-02-27

## 2022-11-14 NOTE — TELEPHONE ENCOUNTER
Overdue for wellness, med check, 1 refill sent of meds, please schedule.    Last visit in this dept:    8/15/2022     Next visit in this dept:   Future Appointments 11/14/2022 - 5/13/2023      Date Visit Type Length Department Provider     2/16/2023  2:30 PM NEW ADULT EYE 30 min EA OPTOMETRY Teresa Adame OD    Location Instructions:     [facility] is located at [address] [place] [id number]                   Health Maintenance   Topic Date Due     ZOSTER IMMUNIZATION (1 of 2) Never done     COVID-19 Vaccine (3 - Booster for Pfizer series) 07/13/2021     A1C  01/28/2022     EYE EXAM  04/29/2022     INFLUENZA VACCINE (1) 09/01/2022     BMP  10/28/2022     CMP  10/28/2022     LIPID  10/28/2022     MICROALBUMIN  10/28/2022     DIABETIC FOOT EXAM  10/28/2022     CBC  10/28/2022     YEARLY PREVENTIVE VISIT  10/28/2022     PSA  10/28/2022     ANNUAL REVIEW OF HM ORDERS  08/15/2023     COLORECTAL CANCER SCREENING  10/12/2025     ADVANCE CARE PLANNING  12/21/2025     DTAP/TDAP/TD IMMUNIZATION (3 - Td or Tdap) 10/15/2030     PHQ-2 (once per calendar year)  Completed     Pneumococcal Vaccine: Pediatrics (0 to 5 Years) and At-Risk Patients (6 to 64 Years)  Completed     IPV IMMUNIZATION  Aged Out     MENINGITIS IMMUNIZATION  Aged Out     HEPATITIS C SCREENING  Discontinued     HIV SCREENING  Discontinued

## 2022-12-02 ENCOUNTER — APPOINTMENT (OUTPATIENT)
Dept: LAB | Facility: CLINIC | Age: 62
End: 2022-12-02
Payer: COMMERCIAL

## 2022-12-04 ENCOUNTER — HEALTH MAINTENANCE LETTER (OUTPATIENT)
Age: 62
End: 2022-12-04

## 2023-02-16 ENCOUNTER — OFFICE VISIT (OUTPATIENT)
Dept: OPTOMETRY | Facility: CLINIC | Age: 63
End: 2023-02-16
Payer: COMMERCIAL

## 2023-02-16 DIAGNOSIS — H52.4 PRESBYOPIA: ICD-10-CM

## 2023-02-16 DIAGNOSIS — H26.9 BILATERAL INCIPIENT CATARACTS: ICD-10-CM

## 2023-02-16 DIAGNOSIS — E11.9 TYPE 2 DIABETES MELLITUS WITHOUT RETINOPATHY (H): Primary | ICD-10-CM

## 2023-02-16 PROCEDURE — 92015 DETERMINE REFRACTIVE STATE: CPT | Performed by: OPTOMETRIST

## 2023-02-16 PROCEDURE — 92004 COMPRE OPH EXAM NEW PT 1/>: CPT | Performed by: OPTOMETRIST

## 2023-02-16 ASSESSMENT — REFRACTION_MANIFEST
OD_SPHERE: PLANO
METHOD_AUTOREFRACTION: 1
OD_CYLINDER: +0.25
OS_CYLINDER: +0.25
OD_AXIS: 041
OS_AXIS: 150
OD_SPHERE: -0.25
OS_CYLINDER: +0.25
OD_CYLINDER: +0.25
OS_AXIS: 154
OS_SPHERE: -0.25
OD_AXIS: 030
OS_ADD: +2.50
OD_ADD: +2.50
OS_SPHERE: PLANO

## 2023-02-16 ASSESSMENT — VISUAL ACUITY
OD_SC: 20/60
OS_SC: 20/20
OD_SC: 20/20
METHOD: SNELLEN - LINEAR
OS_SC: 20/60

## 2023-02-16 ASSESSMENT — CONF VISUAL FIELD
OS_NORMAL: 1
OS_INFERIOR_TEMPORAL_RESTRICTION: 0
OD_INFERIOR_NASAL_RESTRICTION: 0
OD_NORMAL: 1
OD_SUPERIOR_NASAL_RESTRICTION: 0
OS_SUPERIOR_TEMPORAL_RESTRICTION: 0
OD_SUPERIOR_TEMPORAL_RESTRICTION: 0
OS_INFERIOR_NASAL_RESTRICTION: 0
METHOD: COUNTING FINGERS
OD_INFERIOR_TEMPORAL_RESTRICTION: 0
OS_SUPERIOR_NASAL_RESTRICTION: 0

## 2023-02-16 ASSESSMENT — CUP TO DISC RATIO
OD_RATIO: 0.25
OS_RATIO: 0.2

## 2023-02-16 ASSESSMENT — TONOMETRY
OS_IOP_MMHG: 17
OD_IOP_MMHG: 17
IOP_METHOD: APPLANATION

## 2023-02-16 ASSESSMENT — KERATOMETRY
OS_K1POWER_DIOPTERS: 44
OD_K2POWER_DIOPTERS: 45
OS_AXISANGLE_DEGREES: 123
OD_AXISANGLE2_DEGREES: 137
OD_AXISANGLE_DEGREES: 47
OD_K1POWER_DIOPTERS: 44.62
OS_AXISANGLE2_DEGREES: 33
OS_K2POWER_DIOPTERS: 44.75

## 2023-02-16 ASSESSMENT — SLIT LAMP EXAM - LIDS
COMMENTS: NORMAL
COMMENTS: NORMAL

## 2023-02-16 ASSESSMENT — EXTERNAL EXAM - RIGHT EYE: OD_EXAM: NORMAL

## 2023-02-16 ASSESSMENT — EXTERNAL EXAM - LEFT EYE: OS_EXAM: NORMAL

## 2023-02-16 NOTE — PROGRESS NOTES
Chief Complaint   Patient presents with     Diabetic Eye Exam         Lab Results   Component Value Date    A1C 6.2 10/28/2021    A1C 6.2 03/15/2021    A1C 6.4 10/15/2020    A1C 11.0 07/31/2020            Last Eye Exam: 2 years ago  Dilated Previously: Yes, side effects of dilation explained today    What are you currently using to see?  readers    Distance Vision Acuity: Satisfied with vision    Near Vision Acuity: Satisfied with vision while reading and using computer with readers    Eye Comfort: good  Do you use eye drops? : No      Janet Lazaro - Optometric Assistant      Medical, surgical and family histories reviewed and updated 2/16/2023.       OBJECTIVE: See Ophthalmology exam    ASSESSMENT:    ICD-10-CM    1. Type 2 diabetes mellitus without retinopathy (H)  E11.9       2. Presbyopia  H52.4       3. Bilateral incipient cataracts  H26.9           PLAN:  Glucose control over the counter readers or bifocal discussed  Meño Sánchez aware  eye exam results will be sent to Alan Mckeon OD

## 2023-02-16 NOTE — LETTER
2/16/2023         RE: Meño A Princess  [address] [place] [address] [place] [id number]        Dear Colleague,    Thank you for referring your patient, Meño Sánchez, to the [facility]. Please see a copy of my visit note below.    Chief Complaint   Patient presents with     Diabetic Eye Exam         Lab Results   Component Value Date    A1C 6.2 10/28/2021    A1C 6.2 03/15/2021    A1C 6.4 10/15/2020    A1C 11.0 07/31/2020            Last Eye Exam: 2 years ago  Dilated Previously: Yes, side effects of dilation explained today    What are you currently using to see?  readers    Distance Vision Acuity: Satisfied with vision    Near Vision Acuity: Satisfied with vision while reading and using computer with readers    Eye Comfort: good  Do you use eye drops? : No      Janet Lazaro - Optometric Assistant      Medical, surgical and family histories reviewed and updated 2/16/2023.       OBJECTIVE: See Ophthalmology exam    ASSESSMENT:    ICD-10-CM    1. Type 2 diabetes mellitus without retinopathy (H)  E11.9       2. Presbyopia  H52.4       3. Bilateral incipient cataracts  H26.9           PLAN:  Glucose control over the counter readers or bifocal discussed  Meño Sánchez aware  eye exam results will be sent to Alan Mckeon OD           Again, thank you for allowing me to participate in the care of your patient.        Sincerely,        Teresa Adame, OD

## 2023-02-16 NOTE — PATIENT INSTRUCTIONS
Patient Education   Diabetes weakens the blood vessels all over the body, including the eyes. Damage to the blood vessels in the eyes can cause swelling or bleeding into part of the eye (called the retina). This is called diabetic retinopathy (JOHN-tin--pu-thee). If not treated, this disease can cause vision loss or blindness.   Symptoms may include blurred or distorted vision, but many people have no symptoms. It's important to see your eye doctor regularly to check for problems.   Early treatment and good control can help protect your vision. Here are the things you can do to help prevent vision loss:      1. Keep your blood sugar levels under tight control.      2. Bring high blood pressure under control.      3. No smoking.      4. Have yearly dilated eye exams.    Early start of cataracts, not affecting visual acuity of 20/20 both eyes at this time

## 2023-02-22 DIAGNOSIS — E11.9 TYPE 2 DIABETES MELLITUS WITHOUT COMPLICATION, WITHOUT LONG-TERM CURRENT USE OF INSULIN (H): ICD-10-CM

## 2023-02-24 NOTE — TELEPHONE ENCOUNTER
"Routing refill request to provider for review/approval because:  Labs not current: A1C, CR    Last Written Prescription Date:  11/14/2022  Last Fill Quantity: 180,  # refills: 0   Last office visit provider:  8/15/2022     Requested Prescriptions   Pending Prescriptions Disp Refills     metFORMIN (GLUCOPHAGE) 500 MG tablet [Pharmacy Med Name: METFORMIN 500MG TABLETS] 180 tablet 0     Sig: TAKE 1 TABLET(500 MG) BY MOUTH TWICE DAILY WITH MEALS       Biguanide Agents Failed - 2/22/2023 11:30 AM        Failed - Patient has documented A1c within the specified period of time.     If HgbA1C is 8 or greater, it needs to be on file within the past 3 months.  If less than 8, must be on file within the past 6 months.     Recent Labs   Lab Test 10/28/21  1354   A1C 6.2*             Failed - Patient's CR is NOT>1.4 OR Patient's EGFR is NOT<45 within past 12 mos.     Recent Labs   Lab Test 10/28/21  1354 03/23/21  0918   GFRESTIMATED 85 >90   GFRESTBLACK  --  >90       Recent Labs   Lab Test 10/28/21  1354   CR 0.95             Failed - Recent (6 mo) or future (30 days) visit within the authorizing provider's specialty     Patient had office visit in the last 6 months or has a visit in the next 30 days with authorizing provider or within the authorizing provider's specialty.  See \"Patient Info\" tab in inbasket, or \"Choose Columns\" in Meds & Orders section of the refill encounter.            Passed - Patient is age 10 or older        Passed - Patient does NOT have a diagnosis of CHF.        Passed - Medication is active on med list             Melony Perry RN 02/24/23 2:37 AM  "

## 2023-02-27 DIAGNOSIS — E11.9 TYPE 2 DIABETES MELLITUS WITHOUT COMPLICATION, WITHOUT LONG-TERM CURRENT USE OF INSULIN (H): ICD-10-CM

## 2023-02-27 DIAGNOSIS — E78.5 HYPERLIPIDEMIA LDL GOAL <70: ICD-10-CM

## 2023-02-27 RX ORDER — ROSUVASTATIN CALCIUM 5 MG/1
5 TABLET, COATED ORAL DAILY
Qty: 90 TABLET | Refills: 0 | Status: SHIPPED | OUTPATIENT
Start: 2023-02-27 | End: 2023-04-10

## 2023-04-01 ENCOUNTER — HEALTH MAINTENANCE LETTER (OUTPATIENT)
Age: 63
End: 2023-04-01

## 2023-04-07 ENCOUNTER — OFFICE VISIT (OUTPATIENT)
Dept: FAMILY MEDICINE | Facility: CLINIC | Age: 63
End: 2023-04-07
Payer: COMMERCIAL

## 2023-04-07 VITALS
WEIGHT: 198 LBS | TEMPERATURE: 98.2 F | OXYGEN SATURATION: 95 % | RESPIRATION RATE: 14 BRPM | HEART RATE: 99 BPM | HEIGHT: 65 IN | DIASTOLIC BLOOD PRESSURE: 92 MMHG | SYSTOLIC BLOOD PRESSURE: 139 MMHG | BODY MASS INDEX: 32.99 KG/M2

## 2023-04-07 DIAGNOSIS — E78.5 HYPERLIPIDEMIA LDL GOAL <70: ICD-10-CM

## 2023-04-07 DIAGNOSIS — Z12.5 SCREENING FOR PROSTATE CANCER: ICD-10-CM

## 2023-04-07 DIAGNOSIS — G47.33 OBSTRUCTIVE SLEEP APNEA SYNDROME: ICD-10-CM

## 2023-04-07 DIAGNOSIS — E11.42 DIABETIC POLYNEUROPATHY ASSOCIATED WITH TYPE 2 DIABETES MELLITUS (H): Primary | ICD-10-CM

## 2023-04-07 DIAGNOSIS — E55.9 VITAMIN D DEFICIENCY: ICD-10-CM

## 2023-04-07 DIAGNOSIS — E11.9 TYPE 2 DIABETES MELLITUS WITHOUT COMPLICATION, WITHOUT LONG-TERM CURRENT USE OF INSULIN (H): ICD-10-CM

## 2023-04-07 LAB
CHOLEST SERPL-MCNC: 91 MG/DL
CREAT UR-MCNC: 102 MG/DL
ERYTHROCYTE [DISTWIDTH] IN BLOOD BY AUTOMATED COUNT: 12 % (ref 10–15)
HBA1C MFR BLD: 6.6 % (ref 0–5.6)
HCT VFR BLD AUTO: 45.4 % (ref 40–53)
HDLC SERPL-MCNC: 45 MG/DL
HGB BLD-MCNC: 15.8 G/DL (ref 13.3–17.7)
LDLC SERPL CALC-MCNC: 25 MG/DL
MCH RBC QN AUTO: 34.6 PG (ref 26.5–33)
MCHC RBC AUTO-ENTMCNC: 34.8 G/DL (ref 31.5–36.5)
MCV RBC AUTO: 99 FL (ref 78–100)
MICROALBUMIN UR-MCNC: 25.3 MG/L
MICROALBUMIN/CREAT UR: 24.8 MG/G CR (ref 0–17)
NONHDLC SERPL-MCNC: 46 MG/DL
PLATELET # BLD AUTO: 185 10E3/UL (ref 150–450)
PSA SERPL DL<=0.01 NG/ML-MCNC: 0.29 NG/ML (ref 0–4.5)
RBC # BLD AUTO: 4.57 10E6/UL (ref 4.4–5.9)
TRIGL SERPL-MCNC: 105 MG/DL
WBC # BLD AUTO: 6 10E3/UL (ref 4–11)

## 2023-04-07 PROCEDURE — 80061 LIPID PANEL: CPT | Performed by: NURSE PRACTITIONER

## 2023-04-07 PROCEDURE — 82570 ASSAY OF URINE CREATININE: CPT | Performed by: NURSE PRACTITIONER

## 2023-04-07 PROCEDURE — 83036 HEMOGLOBIN GLYCOSYLATED A1C: CPT | Performed by: NURSE PRACTITIONER

## 2023-04-07 PROCEDURE — 82043 UR ALBUMIN QUANTITATIVE: CPT | Performed by: NURSE PRACTITIONER

## 2023-04-07 PROCEDURE — 80053 COMPREHEN METABOLIC PANEL: CPT | Performed by: NURSE PRACTITIONER

## 2023-04-07 PROCEDURE — 36415 COLL VENOUS BLD VENIPUNCTURE: CPT | Performed by: NURSE PRACTITIONER

## 2023-04-07 PROCEDURE — 99214 OFFICE O/P EST MOD 30 MIN: CPT | Performed by: NURSE PRACTITIONER

## 2023-04-07 PROCEDURE — G0103 PSA SCREENING: HCPCS | Performed by: NURSE PRACTITIONER

## 2023-04-07 PROCEDURE — 85027 COMPLETE CBC AUTOMATED: CPT | Performed by: NURSE PRACTITIONER

## 2023-04-07 RX ORDER — ROSUVASTATIN CALCIUM 5 MG/1
5 TABLET, COATED ORAL DAILY
Qty: 90 TABLET | Refills: 0 | Status: CANCELLED | OUTPATIENT
Start: 2023-04-07

## 2023-04-07 RX ORDER — LOSARTAN POTASSIUM 25 MG/1
25 TABLET ORAL DAILY
Qty: 90 TABLET | Refills: 0 | Status: SHIPPED | OUTPATIENT
Start: 2023-04-07 | End: 2023-07-07

## 2023-04-07 NOTE — PROGRESS NOTES
Assessment & Plan     Type 2 diabetes mellitus without complication, without long-term current use of insulin (H)    - COMPREHENSIVE METABOLIC PANEL  - Lipid panel reflex to direct LDL Non-fasting  - Albumin Random Urine Quantitative with Creat Ratio  - CBC with Platelets  - losartan (COZAAR) 25 MG tablet  Dispense: 90 tablet; Refill: 0  - COMPREHENSIVE METABOLIC PANEL  - Lipid panel reflex to direct LDL Non-fasting  - Albumin Random Urine Quantitative with Creat Ratio  - CBC with Platelets  - empagliflozin (JARDIANCE) 10 MG TABS tablet  Dispense: 90 tablet; Refill: 3  - metFORMIN (GLUCOPHAGE) 500 MG tablet  Dispense: 180 tablet; Refill: 3    Screening for prostate cancer    - PROSTATE SPEC ANTIGEN SCREEN  - PROSTATE SPEC ANTIGEN SCREEN    Diabetic polyneuropathy associated with type 2 diabetes mellitus (H)    - HEMOGLOBIN A1C    Hyperlipidemia LDL goal <70    - rosuvastatin (CRESTOR) 5 MG tablet  Dispense: 90 tablet; Refill: 3    Vitamin D deficiency      Obstructive sleep apnea syndrome      We discussed diet and exercise.  I reviewed his readings in the [place] [facility].  He will continue to monitor for any open sores on legs since he can get numbness and tingling to his toes.  I discussed ways to prevent vascular disease and he verbalized understanding.  We will continue to exercise  -Blood pressure is a little bit elevated today and in the past. Has mild protein in urine. He will start low dose Losartan. Monitor BP in two weeks, can increase to 50 mg. Goal BP is ideally below 120/85.  It appears that his weight is stable.   -I reviewed his lipids and they are stable.  He can continue Crestor.  - continue Vitamin D 2000 international unit(s) per day  -Per problem list it stating obstructive sleep apnea syndrome.  Patient stated that he never completed that study.  We will continue to monitor .  I discussed the benefits of diagnosing sleep apnea and offered to repeat the study and he declined .  I discussed conservative  "management to help prevent against sleep apnea today .[id number]}             BMI:   Estimated body mass index is 32.95 kg/m  as calculated from the following:    Height as of this encounter: 1.651 m (5' 5\").    Weight as of this encounter: 89.8 kg (198 lb).           LILI Clark CNP  [facility] RAMILA Wilder is a 62 year old, presenting for the following health issues:  Consult (A1C lab work)    - he denied any med side effects. He works on his diet. Has chronic knee pain, see's Ortho.         8/15/2022     3:40 PM   Additional Questions   Accompanied by alone     History of Present Illness       Diabetes:   He presents for follow up of diabetes.  He is checking home blood glucose four or more times daily. He checks blood glucose before and after meals and at bedtime.  Blood glucose is sometimes over 200 and never under 70. When his blood glucose is low, the patient is asymptomatic for confusion, blurred vision, lethargy and reports not feeling dizzy, shaky, or weak.  He has no concerns regarding his diabetes at this time.  He is having numbness in feet.         He eats 2-3 servings of fruits and vegetables daily.He consumes 0 sweetened beverage(s) daily.He exercises with enough effort to increase his heart rate 20 to 29 minutes per day.  He exercises with enough effort to increase his heart rate 4 days per week.   He is taking medications regularly.        Review of Systems   Constitutional, HEENT, cardiovascular, pulmonary, gi and gu systems are negative, except as otherwise noted.      Objective    BP (!) 139/92   Pulse 99   Temp 98.2  F (36.8  C) (Oral)   Resp 14   Ht 1.651 m (5' 5\")   Wt 89.8 kg (198 lb)   SpO2 95%   BMI 32.95 kg/m    Body mass index is 32.95 kg/m .  Physical Exam   GENERAL: healthy, alert and no distress  NECK: no adenopathy, no asymmetry, masses, or scars and thyroid normal to palpation  RESP: lungs clear to auscultation - no rales, " rhonchi or wheezes  CV: regular rate and rhythm, normal S1 S2, no S3 or S4, no murmur, click or rub, no peripheral edema and peripheral pulses strong  ABDOMEN: soft, nontender, no hepatosplenomegaly, no masses and bowel sounds normal  MS: no gross musculoskeletal defects noted, no edema-- denied any sores, more numbness or tingling.     Results for orders placed or performed in visit on 04/07/23   HEMOGLOBIN A1C     Status: Abnormal   Result Value Ref Range    Hemoglobin A1C 6.6 (H) 0.0 - 5.6 %   COMPREHENSIVE METABOLIC PANEL     Status: Abnormal   Result Value Ref Range    Sodium 144 136 - 145 mmol/L    Potassium 4.3 3.4 - 5.3 mmol/L    Chloride 104 98 - 107 mmol/L    Carbon Dioxide (CO2) 25 22 - 29 mmol/L    Anion Gap 15 7 - 15 mmol/L    Urea Nitrogen 14.3 8.0 - 23.0 mg/dL    Creatinine 1.01 0.67 - 1.17 mg/dL    Calcium 9.9 8.8 - 10.2 mg/dL    Glucose 172 (H) 70 - 99 mg/dL    Alkaline Phosphatase 59 40 - 129 U/L    AST 28 10 - 50 U/L    ALT 29 10 - 50 U/L    Protein Total 7.5 6.4 - 8.3 g/dL    Albumin 4.3 3.5 - 5.2 g/dL    Bilirubin Total 0.6 <=1.2 mg/dL    GFR Estimate 83 >60 mL/min/1.73m2   Lipid panel reflex to direct LDL Non-fasting     Status: Normal   Result Value Ref Range    Cholesterol 91 <200 mg/dL    Triglycerides 105 <150 mg/dL    Direct Measure HDL 45 >=40 mg/dL    LDL Cholesterol Calculated 25 <=100 mg/dL    Non HDL Cholesterol 46 <130 mg/dL    Narrative    Cholesterol  Desirable:  <200 mg/dL    Triglycerides  Normal:  Less than 150 mg/dL  Borderline High:  150-199 mg/dL  High:  200-499 mg/dL  Very High:  Greater than or equal to 500 mg/dL    Direct Measure HDL  Female:  Greater than or equal to 50 mg/dL   Male:  Greater than or equal to 40 mg/dL    LDL Cholesterol  Desirable:  <100mg/dL  Above Desirable:  100-129 mg/dL   Borderline High:  130-159 mg/dL   High:  160-189 mg/dL   Very High:  >= 190 mg/dL    Non HDL Cholesterol  Desirable:  130 mg/dL  Above Desirable:  130-159 mg/dL  Borderline High:   160-189 mg/dL  High:  190-219 mg/dL  Very High:  Greater than or equal to 220 mg/dL   Albumin Random Urine Quantitative with Creat Ratio     Status: Abnormal   Result Value Ref Range    Creatinine Urine mg/dL 102.0 mg/dL    Albumin Urine mg/L 25.3 mg/L    Albumin Urine mg/g Cr 24.80 (H) 0.00 - 17.00 mg/g Cr   PROSTATE SPEC ANTIGEN SCREEN     Status: Normal   Result Value Ref Range    Prostate Specific Antigen Screen 0.29 0.00 - 4.50 ng/mL    Narrative    This result is obtained using the Roche Elecsys total PSA method on the fredo e801 immunoassay analyzer. Results obtained with different assay methods or kits cannot be used interchangeably.   CBC with Platelets     Status: Abnormal   Result Value Ref Range    WBC Count 6.0 4.0 - 11.0 10e3/uL    RBC Count 4.57 4.40 - 5.90 10e6/uL    Hemoglobin 15.8 13.3 - 17.7 g/dL    Hematocrit 45.4 40.0 - 53.0 %    MCV 99 78 - 100 fL    MCH 34.6 (H) 26.5 - 33.0 pg    MCHC 34.8 31.5 - 36.5 g/dL    RDW 12.0 10.0 - 15.0 %    Platelet Count 185 150 - 450 10e3/uL

## 2023-04-08 LAB
ALBUMIN SERPL BCG-MCNC: 4.3 G/DL (ref 3.5–5.2)
ALP SERPL-CCNC: 59 U/L (ref 40–129)
ALT SERPL W P-5'-P-CCNC: 29 U/L (ref 10–50)
ANION GAP SERPL CALCULATED.3IONS-SCNC: 15 MMOL/L (ref 7–15)
AST SERPL W P-5'-P-CCNC: 28 U/L (ref 10–50)
BILIRUB SERPL-MCNC: 0.6 MG/DL
BUN SERPL-MCNC: 14.3 MG/DL (ref 8–23)
CALCIUM SERPL-MCNC: 9.9 MG/DL (ref 8.8–10.2)
CHLORIDE SERPL-SCNC: 104 MMOL/L (ref 98–107)
CREAT SERPL-MCNC: 1.01 MG/DL (ref 0.67–1.17)
DEPRECATED HCO3 PLAS-SCNC: 25 MMOL/L (ref 22–29)
GFR SERPL CREATININE-BSD FRML MDRD: 83 ML/MIN/1.73M2
GLUCOSE SERPL-MCNC: 172 MG/DL (ref 70–99)
POTASSIUM SERPL-SCNC: 4.3 MMOL/L (ref 3.4–5.3)
PROT SERPL-MCNC: 7.5 G/DL (ref 6.4–8.3)
SODIUM SERPL-SCNC: 144 MMOL/L (ref 136–145)

## 2023-04-10 RX ORDER — ROSUVASTATIN CALCIUM 5 MG/1
5 TABLET, COATED ORAL DAILY
Qty: 90 TABLET | Refills: 3 | Status: SHIPPED | OUTPATIENT
Start: 2023-04-10 | End: 2024-04-15

## 2023-04-10 NOTE — RESULT ENCOUNTER NOTE
Arik Wilder    It was nice to see you again!    Your labs overall look stable. You have a little bit of Albumin in your urine so I am glad we are starting you on Losartan. Continue to eat a healthy diet and try to get routine exercise in everyday.     Any questions, please let me know.     LILI Clark CNP

## 2023-08-25 ENCOUNTER — PATIENT OUTREACH (OUTPATIENT)
Dept: GERIATRIC MEDICINE | Facility: CLINIC | Age: 63
End: 2023-08-25
Payer: MEDICARE

## 2023-08-25 NOTE — LETTER
August 25, 2023    ARMANDO GILL  [address] [place] [address] [place] [id number]    Dear  Armando,    Welcome to [facility] s MSC+ health program. My name is Yola Brito RN, PHN. I am your MSC+ care coordinator. You are eligible for Care Coordination through [facility] MSC+ plan.    As your care coordinator, we ll:  Meet to go over your care coordination benefits  Talk about your physical and mental health care needs   Review your preventative care needs  Create a plan that meets your needs with the services you choose    What happens next?  I ll call you soon to introduce myself and tell you more about my role. We ll then plan time to go over your health and safety needs. Our goal is to keep you as healthy and independent as possible.    Soon, you will receive a new MSC+ member identification (ID) card from [facility]. When you receive it, please use this card along with your [place] [facility] card and Prescription Drug Coverage Program card. When you receive, it please use this card where you get your health services. If you have Medicare, you will need to show your Medicare card when you get health services.    The MSC+ care coordination program is voluntary and offered to you at no cost. If you wish to stop being in the care coordination program or have questions, call me at [phone]. If you reach my voicemail, leave a message and your phone number. TTY users, call the [place] [employer] at 533 or [phone] (jjatwm-ep-gkrnvu relay service).    Sincerely,        Yola Brito RN, PHN  [phone]  [email or web address]      [employer]    [id number] accepted   [id number]       [id number] (07/2022)

## 2023-08-25 NOTE — PROGRESS NOTES
[facility] Care Coordination Contact    Member became effective with  Aric on 8/1/2023 with [facility] MSC+.  Previous Health Plan:  [facility]  Previous Care System:  NA  Previous care coordinators name and number: NA  Waiver Type: N/A  Last MMIS Entry: Date NA and Type NA  MMIS visit date (and type) if different from above: NA  Services Listed in MMIS:   UTF received: No UTF to request  Address/Phone discrepancy: NA    Mailed [facility] Leave Behind Document.     Nile Dykes  [facility]  Case Management Specialist  [phone]

## 2023-08-26 ENCOUNTER — HEALTH MAINTENANCE LETTER (OUTPATIENT)
Age: 63
End: 2023-08-26

## 2023-09-12 ENCOUNTER — PATIENT OUTREACH (OUTPATIENT)
Dept: GERIATRIC MEDICINE | Facility: CLINIC | Age: 63
End: 2023-09-12
Payer: MEDICARE

## 2023-09-12 DIAGNOSIS — E11.9 TYPE 2 DIABETES MELLITUS WITHOUT COMPLICATION, WITHOUT LONG-TERM CURRENT USE OF INSULIN (H): ICD-10-CM

## 2023-09-12 NOTE — LETTER
September 12, 2023    MEÑO GILL  [address] [place]  [facility] [address]    Dear Meño:     I m your care coordinator. I ve been unable to reach you by phone. I am writing to ask you or your authorized representative to call me at [phone]. If you reach my voicemail, leave a message with your daytime phone number. Include a date and time that I can call you. If you are hearing impaired, call the [place] [employer] at 050 or [phone] (ehcose-br-ppjkch relay service).    The reason I am trying to reach you is:     [x] To schedule an assessment  [] For your six (6)-month check-in  [x] Other:  initial    Please call me as soon as you receive this letter. I look forward to speaking with you.    Sincerely,      Yola Brito RN, PHN  [phone]  [email or web address]      [employer]        [id number] accepted  [id number]                                                                        B  (08/2022)

## 2023-09-12 NOTE — PROGRESS NOTES
"[facility] Care Coordination Contact    Per CC, mailed client an \"Unable to Contact\" letter.    Nile Dykes  [facility]  Case Management Specialist  [phone]    "

## 2023-09-12 NOTE — PROGRESS NOTES
[facility] Care Coordination Contact    Called member to schedule annual HRA home visit. HRA has been scheduled for 9/12/23.    Yola Brito RN, BSN, PHN  [facility]  Phone: [phone]

## 2023-09-13 RX ORDER — LOSARTAN POTASSIUM 25 MG/1
25 TABLET ORAL DAILY
Qty: 90 TABLET | Refills: 0 | Status: SHIPPED | OUTPATIENT
Start: 2023-09-13 | End: 2023-10-15

## 2023-09-28 ENCOUNTER — PATIENT OUTREACH (OUTPATIENT)
Dept: GERIATRIC MEDICINE | Facility: CLINIC | Age: 63
End: 2023-09-28
Payer: MEDICARE

## 2023-09-28 NOTE — LETTER
September 29, 2023    ARMANDO GILL  [address] [place] [address] [place] [address]        Dear Armando:    As a member of [facility]'s MSC+ program, we offer a health risk assessment at no cost to you. I know you don't want to have the assessment right now. If you change your mind, please call me at the number below.    Who performs the health risk assessment?  A [facility] Care Coordinator performs the assessment. Our Care Coordinators can also help you understand your benefits. They can tell you about services to aid you at home, such as managing your care with your doctors if your health worsens.    Our Care Coordinators are here for you if you need:  Support for activities you used to do by yourself (including making meals, bathing and paying bills)  Equipment for bathroom or home safety  Help finding a new place to live  Information on staying healthy, preventing falls and immunizations    Questions?  If you have questions, or you would like to do he assessment, call me at [phone]. TTY users call [phone]. I'm here from 8am to 5pm. I may reach out to you again soon.       Sincerely,           Yola Brito RN, PHN  [phone]  [email or web address]      [employer]      \<[id number] accepted  [id number]>    [id number] (21/2021)

## 2023-09-28 NOTE — PROGRESS NOTES
[facility] Care Coordination Contact      [facility] Refusal Telephone Assessment    Member refused home visit HRA on 9/28/23 (reason: Member left voice message stating that he will be out of town and will contact CC when available again for a visit).      Follow-Up Plan: CC will await call back from member. Plan to f/u with member with a 6 month telephone assessment and offer a home visit.  Contact information shared with member, encouraged member to call with any questions or concerns at any time.    This CC note routed to PCP, Alan Mckeon RN, BSN, PHN  [facility]  Phone: [phone]

## 2023-09-29 NOTE — PROGRESS NOTES
"[facility] Care Coordination Contact    Per CC, mailed client a \"Refusal of Home Visit\" letter.    Mailed are Leave Behind doc to member.     Nile Dykes  [facility]  Case Management Specialist  [phone]      " Opt out

## 2023-10-06 NOTE — COMMUNITY RESOURCES LIST (ENGLISH)
10/06/2023   [facility] [employer]  N/A  For questions about this resource list or additional care needs, please contact your primary care clinic or care manager.  Phone: [phone]   Email: N/A   Address: [address] [place] [id number]   Hours: N/A        Financial Stability       Rent and mortgage payment assistance  1  [facility]  Office - [facility] - Rent payment assistance Distance: 0.47 miles      In-Person, Phone/Virtual   [address] [place] [address]  Language: English  Hours: Mon - Fri 8:00 AM - 12:00 PM , Mon - Fri 1:00 PM - 4:00 PM  Fees: Free   Phone: (403) 240-3625 Email: [email or web address] Website: [email or web address]     2  [facility] Distance: 0.64 miles      Phone/Virtual   [address] [place] [address]  Language: English  Hours: Mon - Fri 8:00 AM - 4:30 PM  Fees: Free   Phone: (519) 777-5792 Email: office@[employer] Website: http://[employer].org          Important Numbers & Websites       Emergency Services   911  City Services   311  Poison Control   (662) 772-2095  Suicide Prevention Lifeline   (546) 624-6477 (TALK)  Child Abuse Hotline   (793) 455-2798 (4-A-Child)  Sexual Assault Hotline   (200) 905-7292 (HOPE)  National Runaway Safeline   (698) 427-6683 (RUNAWAY)  All-Options Talkline   (279) 510-2876  Substance Abuse Referral   (510) 183-3483 (HELP)

## 2023-10-13 ENCOUNTER — OFFICE VISIT (OUTPATIENT)
Dept: FAMILY MEDICINE | Facility: CLINIC | Age: 63
End: 2023-10-13
Attending: NURSE PRACTITIONER
Payer: MEDICARE

## 2023-10-13 VITALS
HEART RATE: 77 BPM | SYSTOLIC BLOOD PRESSURE: 122 MMHG | BODY MASS INDEX: 34.82 KG/M2 | HEIGHT: 65 IN | OXYGEN SATURATION: 94 % | TEMPERATURE: 98 F | WEIGHT: 209 LBS | DIASTOLIC BLOOD PRESSURE: 100 MMHG

## 2023-10-13 DIAGNOSIS — Z82.49 FAMILY HISTORY OF ABDOMINAL AORTIC ANEURYSM (AAA): ICD-10-CM

## 2023-10-13 DIAGNOSIS — Z00.00 MEDICARE ANNUAL WELLNESS VISIT, SUBSEQUENT: Primary | ICD-10-CM

## 2023-10-13 DIAGNOSIS — I10 HYPERTENSION, UNSPECIFIED TYPE: ICD-10-CM

## 2023-10-13 DIAGNOSIS — M17.31 POST-TRAUMATIC OSTEOARTHRITIS OF RIGHT KNEE: ICD-10-CM

## 2023-10-13 DIAGNOSIS — M25.562 CHRONIC PAIN OF BOTH KNEES: ICD-10-CM

## 2023-10-13 DIAGNOSIS — M17.12 PRIMARY OSTEOARTHRITIS OF LEFT KNEE: ICD-10-CM

## 2023-10-13 DIAGNOSIS — E78.5 HYPERLIPIDEMIA LDL GOAL <70: ICD-10-CM

## 2023-10-13 DIAGNOSIS — E11.9 TYPE 2 DIABETES MELLITUS WITHOUT COMPLICATION, WITHOUT LONG-TERM CURRENT USE OF INSULIN (H): ICD-10-CM

## 2023-10-13 DIAGNOSIS — G89.29 CHRONIC PAIN OF BOTH KNEES: ICD-10-CM

## 2023-10-13 DIAGNOSIS — M25.561 CHRONIC PAIN OF BOTH KNEES: ICD-10-CM

## 2023-10-13 DIAGNOSIS — Z00.00 ENCOUNTER FOR MEDICARE ANNUAL WELLNESS EXAM: ICD-10-CM

## 2023-10-13 DIAGNOSIS — E11.42 DIABETIC POLYNEUROPATHY ASSOCIATED WITH TYPE 2 DIABETES MELLITUS (H): ICD-10-CM

## 2023-10-13 DIAGNOSIS — Z87.891 FORMER CIGARETTE SMOKER: ICD-10-CM

## 2023-10-13 PROCEDURE — G0402 INITIAL PREVENTIVE EXAM: HCPCS | Performed by: NURSE PRACTITIONER

## 2023-10-13 PROCEDURE — 99214 OFFICE O/P EST MOD 30 MIN: CPT | Mod: 25 | Performed by: NURSE PRACTITIONER

## 2023-10-13 RX ORDER — PROCHLORPERAZINE 25 MG/1
1 SUPPOSITORY RECTAL ONCE
Qty: 1 EACH | Refills: 0 | Status: SHIPPED | OUTPATIENT
Start: 2023-10-13 | End: 2023-10-13

## 2023-10-13 RX ORDER — PROCHLORPERAZINE 25 MG/1
1 SUPPOSITORY RECTAL
Qty: 1 EACH | Refills: 3 | Status: SHIPPED | OUTPATIENT
Start: 2023-10-13 | End: 2023-11-22

## 2023-10-13 RX ORDER — RESPIRATORY SYNCYTIAL VIRUS VACCINE 120MCG/0.5
0.5 KIT INTRAMUSCULAR ONCE
Qty: 1 EACH | Refills: 0 | Status: CANCELLED | OUTPATIENT
Start: 2023-10-13 | End: 2023-10-13

## 2023-10-13 RX ORDER — PROCHLORPERAZINE 25 MG/1
1 SUPPOSITORY RECTAL
Qty: 3 EACH | Refills: 11 | Status: SHIPPED | OUTPATIENT
Start: 2023-10-13 | End: 2023-11-22

## 2023-10-13 ASSESSMENT — ENCOUNTER SYMPTOMS
ARTHRALGIAS: 1
DIZZINESS: 1
FREQUENCY: 1

## 2023-10-13 ASSESSMENT — ACTIVITIES OF DAILY LIVING (ADL): CURRENT_FUNCTION: NO ASSISTANCE NEEDED

## 2023-10-13 NOTE — PROGRESS NOTES
"SUBJECTIVE:   Meño is a 63 year old who presents for Preventive Visit.  - doing overall well. Has cut down on drinking ETOH. Not checking his blood sugars since his Kalina was not covered by insurance anymore. Denied any hypoglycemia. BP stable. He declined to repeat TAYA test. He stated since he cut down on drinking, his snoring has improved. HO left TKA, and still has pain to his knee. He also has right knee pain, can be moderate to severe pain.         10/13/2023    11:43 AM   Additional Questions   Roomed by Janneth ALEGRIA       Are you in the first 12 months of your Medicare coverage?  Yes,  Visual Acuity:  Right Eye: 20/16   Left Eye: 20/20  Both Eyes: 20/16    Healthy Habits:     In general, how would you rate your overall health?  Fair    Frequency of exercise:  2-3 days/week    Duration of exercise:  45-60 minutes    Do you usually eat at least 4 servings of fruit and vegetables a day, include whole grains    & fiber and avoid regularly eating high fat or \"junk\" foods?  No    Taking medications regularly:  Yes    Medication side effects:  Muscle aches    Ability to successfully perform activities of daily living:  No assistance needed    Home Safety:  No safety concerns identified    Hearing Impairment:  Difficulty understanding soft or whispered speech    In the past 6 months, have you been bothered by leaking of urine?  No    In general, how would you rate your overall mental or emotional health?  Good      Today's PHQ-2 Score:       10/13/2023    11:30 AM   PHQ-2 ( 1999 Pf[employer])   Q1: Little interest or pleasure in doing things 0   Q2: Feeling down, depressed or hopeless 0   PHQ-2 Score 0   Q1: Little interest or pleasure in doing things Not at all   Q2: Feeling down, depressed or hopeless Not at all   PHQ-2 Score 0           Have you ever done Advance Care Planning? (For example, a Health Directive, POLST, or a discussion with a medical provider or your loved ones about your wishes): No, advance care " planning information given to patient to review.  Advanced care planning was discussed at today's visit.    Fall risk  Fallen 2 or more times in the past year?: No  Any fall with injury in the past year?: No    Cognitive Screening   1) Repeat 3 item  2) Clock draw: NORMAL  3) 3 item recall: Recalls 3 objects  Results: 3 items recalled: COGNITIVE IMPAIRMENT LESS LIKELY    Mini-CogTM Copyright ASHLEY Phipps. Licensed by the author for use in [facility]; reprinted with permission (shabana@[employer]). All rights reserved.      Do you have sleep apnea, excessive snoring or daytime drowsiness? : yes    Reviewed and updated as needed this visit by clinical staff   Tobacco  Allergies  Meds  Problems  Med Hx            Reviewed and updated as needed this visit by Provider   Tobacco  Allergies  Meds  Problems  Med Hx           Social History     Tobacco Use     Smoking status: Former     Packs/day: 0.00     Years: 5.00     Additional pack years: 0.00     Total pack years: 0.00     Types: Cigarettes     Start date: 2000     Quit date: 2010     Years since quittin.7     Smokeless tobacco: Former     Quit date: 2010     Tobacco comments:     15 cig per week   Substance Use Topics     Alcohol use: Yes     Alcohol/week: 4.0 standard drinks of alcohol     Types: 1 Glasses of wine, 3 Cans of beer per week     Comment: occassional; 2-3 beers per week             10/13/2023    11:29 AM   Alcohol Use   Prescreen: >3 drinks/day or >7 drinks/week? No     Do you have a current opioid prescription? No  Do you use any other controlled substances or medications that are not prescribed by a provider? None      Current providers sharing in care for this patient include:   Patient Care Team:  Alan Mckeon MD as PCP - General (Family Practice)  Teresa Adame OD as Assigned Surgical Provider  Angela Limon APRN CNP as Assigned PCP  Yola Brito RN as Lead Care Coordinator (Primary Care -  "CC)    The following health maintenance items are reviewed in Epic and correct as of today:  Health Maintenance   Topic Date Due     ZOSTER IMMUNIZATION (1 of 2) Never done     LUNG CANCER SCREENING  Never done     RSV VACCINE 60+ (1 - 1-dose 60+ series) Never done     DIABETIC FOOT EXAM  10/28/2022     MEDICARE ANNUAL WELLNESS VISIT  07/04/2023     AORTIC ANEURYSM SCREENING (SYSTEM ASSIGNED)  Never done     A1C  07/07/2023     INFLUENZA VACCINE (1) 09/01/2023     COVID-19 Vaccine (3 - 2023-24 season) 09/01/2023     EYE EXAM  02/16/2024     BMP  04/07/2024     CMP  04/07/2024     LIPID  04/07/2024     MICROALBUMIN  04/07/2024     PSA  04/07/2024     CBC  04/07/2024     ANNUAL REVIEW OF HM ORDERS  10/13/2024     FALL RISK ASSESSMENT  10/13/2024     COLORECTAL CANCER SCREENING  10/12/2025     ADVANCE CARE PLANNING  10/15/2028     DTAP/TDAP/TD IMMUNIZATION (3 - Td or Tdap) 10/15/2030     PHQ-2 (once per calendar year)  Completed     Pneumococcal Vaccine: 65+ Years  Completed     HEPATITIS B IMMUNIZATION  Completed     IPV IMMUNIZATION  Aged Out     HPV IMMUNIZATION  Aged Out     MENINGITIS IMMUNIZATION  Aged Out     HEPATITIS C SCREENING  Discontinued     HIV SCREENING  Discontinued     Review of Systems   HENT:  Positive for hearing loss.    Genitourinary:  Positive for frequency.   Musculoskeletal:  Positive for arthralgias.   Neurological:  Positive for dizziness.         OBJECTIVE:   BP (!) 122/100   Pulse 77   Temp 98  F (36.7  C) (Oral)   Ht 1.651 m (5' 5\")   Wt 94.8 kg (209 lb)   SpO2 94%   BMI 34.78 kg/m   Estimated body mass index is 34.78 kg/m  as calculated from the following:    Height as of this encounter: 1.651 m (5' 5\").    Weight as of this encounter: 94.8 kg (209 lb).  Physical Exam  GENERAL: healthy, alert and no distress  NECK: no adenopathy, no asymmetry, masses, or scars and thyroid normal to palpation  RESP: lungs clear to auscultation - no rales, rhonchi or wheezes  CV: regular rate and " rhythm, normal S1 S2, no S3 or S4, no murmur, click or rub, no peripheral edema and peripheral pulses strong  ABDOMEN: soft, nontender, no hepatosplenomegaly, no masses and bowel sounds normal  MS: no gross musculoskeletal defects noted, no edema    Diagnostic Test Results:  Labs reviewed in Epic    ASSESSMENT / PLAN:       ICD-10-CM    1. Medicare annual wellness visit, subsequent  Z00.00       2. Type 2 diabetes mellitus without complication, without long-term current use of insulin (H)  E11.9 HEMOGLOBIN A1C     Continuous Blood Gluc  (DEXCOM G6 ) MASON     Continuous Blood Gluc Transmit (DEXCOM G6 TRANSMITTER) MISC     Continuous Blood Gluc Sensor (DEXCOM G6 SENSOR) MISC     losartan (COZAAR) 25 MG tablet      3. Family history of abdominal aortic aneurysm (AAA)  Z82.49 US Aorta Medicare AAA Screening      4. Former cigarette smoker  Z87.891 US Aorta Medicare AAA Screening      5. Chronic pain of both knees  M25.561 Orthopedic  Referral    M25.562     G89.29       6. Hyperlipidemia LDL goal <70  E78.5 US Aorta Medicare AAA Screening      7. Diabetic polyneuropathy associated with type 2 diabetes mellitus (H)  E11.42 US Aorta Medicare AAA Screening     losartan (COZAAR) 25 MG tablet      8. Post-traumatic osteoarthritis of right knee  M17.31 Orthopedic  Referral      9. Primary osteoarthritis of left knee  M17.12 Orthopedic  Referral      10. Hypertension, unspecified type  I10 losartan (COZAAR) 25 MG tablet      11. Encounter for Medicare annual wellness exam  Z00.00         - VSS, Will continue Losartan. Med refilled.   - A1c ordered. Will continue Jardiance. Lipids stable on crestor. Dexcom ordered. Please let me know if you do not get it.   - Ortho ordered for chronic knee pain.   - AAA ordered.   - declined sleep study;   I discussed the benefits of diagnosing sleep apnea and offered to repeat the study and he declined .  I discussed conservative management to help  "prevent against sleep apnea today       Patient has been advised of split billing requirements and indicates understanding: Yes      COUNSELING:  Reviewed preventive health counseling, as reflected in patient instructions      BMI:   Estimated body mass index is 34.78 kg/m  as calculated from the following:    Height as of this encounter: 1.651 m (5' 5\").    Weight as of this encounter: 94.8 kg (209 lb).   Weight management plan: Discussed healthy diet and exercise guidelines      He reports that he quit smoking about 13 years ago. His smoking use included cigarettes. He started smoking about 23 years ago. He quit smokeless tobacco use about 13 years ago.      Appropriate preventive services were discussed with this patient, including applicable screening as appropriate for fall prevention, nutrition, physical activity, Tobacco-use cessation, weight loss and cognition.  Checklist reviewing preventive services available has been given to the patient.    Reviewed patients plan of care and provided an AVS. The Basic Care Plan (routine screening as documented in Health Maintenance) for Meño meets the Care Plan requirement. This Care Plan has been established and reviewed with the Patient.          LILI [facility]    Identified Health Risks:  I have reviewed Opioid Use Disorder and Substance Use Disorder risk factors and made any needed referrals.   The patient was provided with suggestions to help him develop a healthy physical lifestyle.  The patient was counseled and encouraged to consider modifying their diet and eating habits. He was provided with information on recommended healthy diet options.  The patient was provided with written information regarding signs of hearing loss.  "

## 2023-10-15 PROBLEM — Z82.49 FAMILY HISTORY OF ABDOMINAL AORTIC ANEURYSM (AAA): Status: ACTIVE | Noted: 2023-10-15

## 2023-10-15 PROBLEM — Z87.891 FORMER CIGARETTE SMOKER: Status: ACTIVE | Noted: 2023-10-15

## 2023-10-15 RX ORDER — LOSARTAN POTASSIUM 25 MG/1
25 TABLET ORAL DAILY
Qty: 90 TABLET | Refills: 3 | Status: SHIPPED | OUTPATIENT
Start: 2023-10-15 | End: 2024-04-19

## 2023-10-15 NOTE — PATIENT INSTRUCTIONS
Patient Education   Personalized Prevention Plan  You are due for the preventive services outlined below.  Your care team is available to assist you in scheduling these services.  If you have already completed any of these items, please share that information with your care team to update in your medical record.  Health Maintenance Due   Topic Date Due     Zoster (Shingles) Vaccine (1 of 2) Never done     LUNG CANCER SCREENING  Never done     RSV VACCINE 60+ (1 - 1-dose 60+ series) Never done     Diabetic Foot Exam  10/28/2022     Annual Wellness Visit  07/04/2023     AORTIC ANEURYSM SCREENING (SYSTEM ASSIGNED)  Never done     A1C Lab  07/07/2023     Flu Vaccine (1) 09/01/2023     COVID-19 Vaccine (3 - 2023-24 season) 09/01/2023     Your Health Risk Assessment indicates you feel you are not in good health    A healthy lifestyle helps keep the body fit and the mind alert. It helps protect you from disease, helps you fight disease, and helps prevent chronic disease (disease that doesn't go away) from getting worse. This is important as you get older and begin to notice twinges in muscles and joints and a decline in the strength and stamina you once took for granted. A healthy lifestyle includes good healthcare, good nutrition, weight control, recreation, and regular exercise. Avoid harmful substances and do what you can to keep safe. Another part of a healthy lifestyle is stay mentally active and socially involved.    Good healthcare   Have a wellness visit every year.   If you have new symptoms, let us know right away. Don't wait until the next checkup.   Take medicines exactly as prescribed and keep your medicines in a safe place. Tell us if your medicine causes problems.   Healthy diet and weight control   Eat 3 or 4 small, nutritious, low-fat, high-fiber meals a day. Include a variety of fruits, vegetables, and whole-grain foods.   Make sure you get enough calcium in your diet. Calcium, vitamin D, and exercise  help prevent osteoporosis (bone thinning).   If you live alone, try eating with others when you can. That way you get a good meal and have company while you eat it.   Try to keep a healthy weight. If you eat more calories than your body uses for energy, it will be stored as fat and you will gain weight.     Recreation   Recreation is not limited to sports and team events. It includes any activity that provides relaxation, interest, enjoyment, and exercise. Recreation provides an outlet for physical, mental, and social energy. It can give a sense of worth and achievement. It can help you stay healthy.    Mental Exercise and Social Involvement  Mental and emotional health is as important as physical health. Keep in touch with friends and family. Stay as active as possible. Continue to learn and challenge yourself.   Things you can do to stay mentally active are:  Learn something new, like a foreign language or musical instrument.   Play SCRABBLE or do crossword puzzles. If you cannot find people to play these games with you at home, you can play them with others on your computer through the Internet.   Join a games club--anything from card games to chess or checkers or lawn bowling.   Start a new hobby.   Go back to school.   Volunteer.   Read.   Keep up with world events.  Learning About Dietary Guidelines  What are the Dietary Guidelines for Americans?     Dietary Guidelines for Americans provide tips for eating well and staying healthy. This helps reduce the risk for long-term (chronic) diseases.  These guidelines recommend that you:  Eat and drink the right amount for you. The U.S. government's food guide is called MyPlate. It can help you make your own well-balanced eating plan.  Try to balance your eating with your activity. This helps you stay at a healthy weight.  Drink alcohol in moderation, if at all.  Limit foods high in salt, saturated fat, trans fat, and added sugar.  These guidelines are from the U.[place]  "Department of Agriculture and the U.S. Department of Health and Human Services. They are updated every 5 years.  What is MyPlate?  MyPlate is the U.S. government's food guide. It can help you make your own well-balanced eating plan. A balanced eating plan means that you eat enough, but not too much, and that your food gives you the nutrients you need to stay healthy.  MyPlate focuses on eating plenty of whole grains, fruits, and vegetables, and on limiting fat and sugar. It is available online at www.ChooseMyPlate.gov.  How can you get started?  If you're trying to eat healthier, you can slowly change your eating habits over time. You don't have to make big changes all at once. Start by adding one or two healthy foods to your meals each day.  Grains  Choose whole-grain breads and cereals and whole-wheat pasta and whole-grain crackers.  Vegetables  Eat a variety of vegetables every day. They have lots of nutrients and are part of a heart-healthy diet.  Fruits  Eat a variety of fruits every day. Fruits contain lots of nutrients. Choose fresh fruit instead of fruit juice.  Protein foods  Choose fish and lean poultry more often. Eat red meat and fried meats less often. Dried beans, tofu, and nuts are also good sources of protein.  Dairy  Choose low-fat or fat-free products from this food group. If you have problems digesting milk, try eating cheese or yogurt instead.  Fats and oils  Limit fats and oils if you're trying to cut calories. Choose healthy fats when you cook. These include canola oil and olive oil.  Where can you learn more?  Go to https://www.[employer].net/patiented  Enter D676 in the search box to learn more about \"Learning About Dietary Guidelines.\"  Current as of: March 1, 2023               Content Version: 13.7    5998-9576 [employer].   Care instructions adapted under license by your healthcare professional. If you have questions about a medical condition or this instruction, always ask " your healthcare professional. [employer] disclaims any warranty or liability for your use of this information.      Hearing Loss: Care Instructions  Overview     Hearing loss is a sudden or slow decrease in how well you hear. It can range from slight to profound. Permanent hearing loss can occur with aging. It also can happen when you are exposed long-term to loud noise. Examples include listening to loud music, riding motorcycles, or being around other loud machines.  Hearing loss can affect your work and home life. It can make you feel lonely or depressed. You may feel that you have lost your independence. But hearing aids and other devices can help you hear better and feel connected to others.  Follow-up care is a key part of your treatment and safety. Be sure to make and go to all appointments, and call your doctor if you are having problems. It's also a good idea to know your test results and keep a list of the medicines you take.  How can you care for yourself at home?  Avoid loud noises whenever possible. This helps keep your hearing from getting worse.  Always wear hearing protection around loud noises.  Wear a hearing aid as directed.  A professional can help you pick a hearing aid that will work best for you.  You can also get hearing aids over the counter for mild to moderate hearing loss.  Have hearing tests as your doctor suggests. They can show whether your hearing has changed. Your hearing aid may need to be adjusted.  Use other devices as needed. These may include:  Telephone amplifiers and hearing aids that can connect to a television, stereo, radio, or microphone.  Devices that use lights or vibrations. These alert you to the doorbell, a ringing telephone, or a baby monitor.  Television closed-captioning. This shows the words at the bottom of the screen. Most new TVs can do this.  TTY (text telephone). This lets you type messages back and forth on the telephone instead of talking or  "listening. These devices are also called TDD. When messages are typed on the keyboard, they are sent over the phone line to a receiving TTY. The message is shown on a monitor.  Use text messaging, social media, and email if it is hard for you to communicate by telephone.  Try to learn a listening technique called speechreading. It is not lipreading. You pay attention to people's gestures, expressions, posture, and tone of voice. These clues can help you understand what a person is saying. Face the person you are talking to, and have them face you. Make sure the lighting is good. You need to see the other person's face clearly.  Think about counseling if you need help to adjust to your hearing loss.  When should you call for help?  Watch closely for changes in your health, and be sure to contact your doctor if:    You think your hearing is getting worse.     You have new symptoms, such as dizziness or nausea.   Where can you learn more?  Go to https://www.[employer].net/patiented  Enter R798 in the search box to learn more about \"Hearing Loss: Care Instructions.\"  Current as of: March 1, 2023               Content Version: 13.7    8844-0023 [employer].   Care instructions adapted under license by your healthcare professional. If you have questions about a medical condition or this instruction, always ask your healthcare professional. [employer] disclaims any warranty or liability for your use of this information.         "

## 2023-10-18 ENCOUNTER — HOSPITAL ENCOUNTER (OUTPATIENT)
Dept: ULTRASOUND IMAGING | Facility: CLINIC | Age: 63
Discharge: HOME | End: 2023-10-18
Attending: NURSE PRACTITIONER | Admitting: NURSE PRACTITIONER
Payer: MEDICARE

## 2023-10-18 DIAGNOSIS — Z87.891 FORMER CIGARETTE SMOKER: ICD-10-CM

## 2023-10-18 DIAGNOSIS — Z82.49 FAMILY HISTORY OF ABDOMINAL AORTIC ANEURYSM (AAA): ICD-10-CM

## 2023-10-18 DIAGNOSIS — E78.5 HYPERLIPIDEMIA LDL GOAL <70: ICD-10-CM

## 2023-10-18 DIAGNOSIS — E11.42 DIABETIC POLYNEUROPATHY ASSOCIATED WITH TYPE 2 DIABETES MELLITUS (H): ICD-10-CM

## 2023-10-18 PROCEDURE — 76706 US ABDL AORTA SCREEN AAA: CPT

## 2023-11-01 ENCOUNTER — TELEPHONE (OUTPATIENT)
Dept: FAMILY MEDICINE | Facility: CLINIC | Age: 63
End: 2023-11-01
Payer: MEDICARE

## 2023-11-01 NOTE — TELEPHONE ENCOUNTER
General Call      Reason for Call:  [facility]anel griffin [place] needs chart notes for pt to get dexcom    What are your questions or concerns:  Pt has been trying to get dexcom from Shanel for some time now, but Shanel states they are still waiting on the chart notes from the clinic. Please send chart notes to justify dexcom and reach back out to pt    Date of last appointment with provider: 10/13/23 with Brandy Limon    Could we send this information to you in [place] or would you prefer to receive a phone call?:   Patient would prefer a phone call   Okay to leave a detailed message?: Yes at Home number on file [phone] (home)

## 2023-11-08 ENCOUNTER — LAB (OUTPATIENT)
Dept: LAB | Facility: CLINIC | Age: 63
End: 2023-11-08
Attending: NURSE PRACTITIONER
Payer: MEDICARE

## 2023-11-08 ENCOUNTER — IMMUNIZATION (OUTPATIENT)
Dept: FAMILY MEDICINE | Facility: CLINIC | Age: 63
End: 2023-11-08
Payer: MEDICARE

## 2023-11-08 DIAGNOSIS — Z23 ENCOUNTER FOR IMMUNIZATION: Primary | ICD-10-CM

## 2023-11-08 DIAGNOSIS — Z29.11 NEED FOR VACCINATION AGAINST RESPIRATORY SYNCYTIAL VIRUS: ICD-10-CM

## 2023-11-08 DIAGNOSIS — E11.9 TYPE 2 DIABETES MELLITUS WITHOUT COMPLICATION, WITHOUT LONG-TERM CURRENT USE OF INSULIN (H): ICD-10-CM

## 2023-11-08 DIAGNOSIS — Z23 NEED FOR SHINGLES VACCINE: ICD-10-CM

## 2023-11-08 LAB — HBA1C MFR BLD: 6.4 % (ref 0–5.6)

## 2023-11-08 PROCEDURE — 36415 COLL VENOUS BLD VENIPUNCTURE: CPT

## 2023-11-08 PROCEDURE — 90750 HZV VACC RECOMBINANT IM: CPT

## 2023-11-08 PROCEDURE — 83036 HEMOGLOBIN GLYCOSYLATED A1C: CPT

## 2023-11-08 PROCEDURE — G0008 ADMIN INFLUENZA VIRUS VAC: HCPCS

## 2023-11-08 PROCEDURE — 90472 IMMUNIZATION ADMIN EACH ADD: CPT

## 2023-11-08 PROCEDURE — 90662 IIV NO PRSV INCREASED AG IM: CPT

## 2023-11-08 NOTE — PROGRESS NOTES
Prior to immunization administration, verified patients identity using patient s name and date of birth. Please see Immunization Activity for additional information.     Screening Questionnaire for Adult Immunization    Are you sick today?   No   Do you have allergies to medications, food, a vaccine component or latex?   No   Have you ever had a serious reaction after receiving a vaccination?   No   Do you have a long-term health problem with heart, lung, kidney, or metabolic disease (e.g., diabetes), asthma, a blood disorder, no spleen, complement component deficiency, a cochlear implant, or a spinal fluid leak?  Are you on long-term aspirin therapy?   No   Do you have cancer, leukemia, HIV/AIDS, or any other immune system problem?   No   Do you have a parent, brother, or sister with an immune system problem?   No   In the past 3 months, have you taken medications that affect  your immune system, such as prednisone, other steroids, or anticancer drugs; drugs for the treatment of rheumatoid arthritis, Crohn s disease, or psoriasis; or have you had radiation treatments?   No   Have you had a seizure, or a brain or other nervous system problem?   No   During the past year, have you received a transfusion of blood or blood    products, or been given immune (gamma) globulin or antiviral drug?   No   For women: Are you pregnant or is there a chance you could become       pregnant during the next month?   No   Have you received any vaccinations in the past 4 weeks?   No     Immunization questionnaire answers were all negative.    I have reviewed the following standing orders:   This patient is due and qualifies for the Influenza vaccine.    Click here for Influenza Vaccine Standing Order    I have reviewed the vaccines inclusion and exclusion criteria; No concerns regarding eligibility.         This patient is due and qualifies for the Zoster vaccine.    Click here for Zoster Standing Order    I have reviewed the vaccines  inclusion and exclusion criteria; No concerns regarding eligibility.     Patient instructed to remain in clinic for 15 minutes afterwards, and to report any adverse reactions.     Screening performed by Renetta Abel MA on 11/8/2023 at 1:47 PM.

## 2023-11-08 NOTE — TELEPHONE ENCOUNTER
Would like Freestyle Kalina 2 sensor instead, is more familiar with this. Faxed chart notes to [facility]

## 2023-11-09 ENCOUNTER — PATIENT OUTREACH (OUTPATIENT)
Dept: GERIATRIC MEDICINE | Facility: CLINIC | Age: 63
End: 2023-11-09
Payer: MEDICARE

## 2023-11-09 NOTE — Clinical Note
Arik Miller, this is just an FYI. Not eligible for any services currently, please let me know if you have any questions.   Thanks,  Yola Brito RN, BSN, PHN [facility] Phone: [phone]

## 2023-11-15 ASSESSMENT — ACTIVITIES OF DAILY LIVING (ADL): DEPENDENT_IADLS:: INDEPENDENT

## 2023-11-15 NOTE — PROGRESS NOTES
[facility] Care Coordination Contact    [facility] Initial Assessment     Home visit for Initial Health Risk Assessment with Meño Sánchez completed on November 09, 2023    Type of residence:: Apartment  Current living arrangement:: I live in a private home     Assessment completed with:: Patient    Current Care Plan  Member currently receiving the following home care services:     Member currently receiving the following community resources: None      Medication Review  Medication reconciliation completed in Epic: Yes  Medication set-up & administration: Independent-does not set up.  Self-administers medications.  Medication Risk Assessment Medication (1 or more, place referral to MTM): N/A: No risk factors identified  MTM Referral Placed: No: No risk factors idenified    Mental/Behavioral Health   Depression Screening:   PHQ-2 Total Score (Adult) - Positive if 3 or more points; Administer PHQ-9 if positive: 0       Mental health DX:: No            Falls Assessment:   Fallen 2 or more times in the past year?: No   Any fall with injury in the past year?: No    ADL/IADL Dependencies:   Dependent ADLs:: Independent  Dependent IADLs:: Independent    [facility] sponsored benefits: Shared information re: Silver Sneakers/gym memberships, ASA, Calcium +D.    PCA Assessment completed at visit: Not Applicable     Elderly Waiver Eligibility: No-does not meet criteria    Care Plan & Recommendations: Meño is independent is not eligible for any services at this time. He will contact CC if any changes in the future.     See [facility] for detailed assessment information.    Follow-Up Plan: Member informed of future contact, plan to f/u with member with a 6 month telephone assessment.  Contact information shared with member and family, encouraged member to call with any questions or concerns at any time.    [place] care continuum providers: Please see Snapshot and Care Management Flowsheets for Specific details of  care plan.    This CC note routed to PCP, Angela Limon RN, BSN, N  [facility]  Phone: [phone]

## 2023-11-22 ENCOUNTER — PATIENT OUTREACH (OUTPATIENT)
Dept: GERIATRIC MEDICINE | Facility: CLINIC | Age: 63
End: 2023-11-22
Payer: MEDICARE

## 2023-11-22 ENCOUNTER — TELEPHONE (OUTPATIENT)
Dept: FAMILY MEDICINE | Facility: CLINIC | Age: 63
End: 2023-11-22
Payer: MEDICARE

## 2023-11-22 DIAGNOSIS — E11.9 TYPE 2 DIABETES MELLITUS WITHOUT COMPLICATION, WITHOUT LONG-TERM CURRENT USE OF INSULIN (H): ICD-10-CM

## 2023-11-22 NOTE — PROGRESS NOTES
[facility] Care Coordination Contact    Received after visit chart from care coordinator.  Completed following tasks: Mailed copy of care plan to client, Mailed Safe Medication Disposal , and Uploaded consent to communicate form(s) to Rika Dykes  [facility]  Case Management Specialist  [phone]

## 2023-11-22 NOTE — TELEPHONE ENCOUNTER
New Medication Request        What medication are you requesting?: Freestyle Kalina    Reason for medication request: Pt states he has been having issues receiving this    Have you taken this medication before?: Yes: Pt states he has used but now is having trouble getting it through [employer]. He would like to get it through [facility]    Controlled Substance Agreement on file:   CSA -- Patient Level:    CSA: None found at the patient level.         Patient offered an appointment? No    Preferred Pharmacy:   [facility]/pharmacy #[id number] [place], [place] - [address]       Could we send this information to you in [facility] or would you prefer to receive a phone call?:   Patient would prefer a phone call   Okay to leave a detailed message?: Yes at Home number on file [phone] (home)

## 2023-11-22 NOTE — LETTER
November 22, 2023    ARMANDO GILL  [address] [place] [address] [place] [id number]        Dear Armando:    At [facility], we re dedicated to improving your health and wellness. Enclosed is the Care Plan developed with you on 11/9/23. Please review the Care Plan carefully.    As a reminder, during your visit we talked about:  Ways to manage your physical and mental health  Using health care to maintain and improve your health   Your preventive care needs     Remember to contact your care coordinator if you:  Are hospitalized, or plan to be hospitalized   Have a fall    Have a change in your physical or mental health  Need help finding support or services    If you have questions, or don t agree with your Care Plan, call me at [phone]. You can also call me if your needs change. TTY users, call the [place] [employer] at (431) or [phone] (fqgqhy-do-zttofm relay service).    Sincerely,          Yola Brito RN, PHN  [phone]  [email or web address]      [employer]    [id number] accepted    [id number] (07/2022)

## 2023-11-22 NOTE — TELEPHONE ENCOUNTER
Refill encounter initiated to fill at [facility] pharmacy.    Sarah Velarde RN

## 2023-11-24 RX ORDER — PROCHLORPERAZINE 25 MG/1
1 SUPPOSITORY RECTAL
Qty: 1 EACH | Refills: 1 | Status: SHIPPED | OUTPATIENT
Start: 2023-11-24 | End: 2024-04-16

## 2023-11-27 RX ORDER — PROCHLORPERAZINE 25 MG/1
1 SUPPOSITORY RECTAL
Qty: 3 EACH | Refills: 11 | Status: SHIPPED | OUTPATIENT
Start: 2023-11-27 | End: 2024-04-16

## 2024-03-23 ENCOUNTER — HEALTH MAINTENANCE LETTER (OUTPATIENT)
Age: 64
End: 2024-03-23

## 2024-04-12 ENCOUNTER — PATIENT OUTREACH (OUTPATIENT)
Dept: GERIATRIC MEDICINE | Facility: CLINIC | Age: 64
End: 2024-04-12
Payer: COMMERCIAL

## 2024-04-12 NOTE — PROGRESS NOTES
[facility] Care Coordination Contact    CHW called member and left a voice mail to remind member about MA renewal and if member requires assistance with their medical assistance renewal paperwork, because the eligibility review is due on (05/01/24).   CHW provided contact information.      DENNY Escalona  [facility]  [phone]

## 2024-04-15 DIAGNOSIS — E11.9 TYPE 2 DIABETES MELLITUS WITHOUT COMPLICATION, WITHOUT LONG-TERM CURRENT USE OF INSULIN (H): ICD-10-CM

## 2024-04-15 DIAGNOSIS — E78.5 HYPERLIPIDEMIA LDL GOAL <70: ICD-10-CM

## 2024-04-15 RX ORDER — ROSUVASTATIN CALCIUM 5 MG/1
5 TABLET, COATED ORAL DAILY
Qty: 90 TABLET | Refills: 0 | Status: SHIPPED | OUTPATIENT
Start: 2024-04-15

## 2024-04-15 NOTE — TELEPHONE ENCOUNTER
Refill request for the following medication(s):  Pending Prescriptions:                       Disp   Refills    rosuvastatin (CRESTOR) 5 MG tablet        90 tab*3            Sig: Take 1 tablet (5 mg) by mouth daily    metFORMIN (GLUCOPHAGE) 500 MG tablet      180 ta*3            Sig: Take 1 tablet (500 mg) by mouth 2 times daily           (with meals)      Pharmacy:    [facility]/PHARMACY #[id number] - [place], [place] - [address]

## 2024-04-16 ENCOUNTER — OFFICE VISIT (OUTPATIENT)
Dept: FAMILY MEDICINE | Facility: CLINIC | Age: 64
End: 2024-04-16
Attending: NURSE PRACTITIONER
Payer: COMMERCIAL

## 2024-04-16 VITALS
OXYGEN SATURATION: 95 % | SYSTOLIC BLOOD PRESSURE: 133 MMHG | TEMPERATURE: 98.5 F | RESPIRATION RATE: 14 BRPM | HEART RATE: 77 BPM | DIASTOLIC BLOOD PRESSURE: 82 MMHG | BODY MASS INDEX: 31.16 KG/M2 | WEIGHT: 187 LBS | HEIGHT: 65 IN

## 2024-04-16 DIAGNOSIS — E78.5 HYPERLIPIDEMIA LDL GOAL <70: ICD-10-CM

## 2024-04-16 DIAGNOSIS — I10 HYPERTENSION, UNSPECIFIED TYPE: ICD-10-CM

## 2024-04-16 DIAGNOSIS — E11.9 TYPE 2 DIABETES MELLITUS WITHOUT COMPLICATION, WITHOUT LONG-TERM CURRENT USE OF INSULIN (H): ICD-10-CM

## 2024-04-16 DIAGNOSIS — G47.33 OBSTRUCTIVE SLEEP APNEA SYNDROME: ICD-10-CM

## 2024-04-16 DIAGNOSIS — E11.42 DIABETIC POLYNEUROPATHY ASSOCIATED WITH TYPE 2 DIABETES MELLITUS (H): ICD-10-CM

## 2024-04-16 DIAGNOSIS — Z12.5 SCREENING FOR PROSTATE CANCER: ICD-10-CM

## 2024-04-16 DIAGNOSIS — Z00.00 MEDICARE ANNUAL WELLNESS VISIT, SUBSEQUENT: Primary | ICD-10-CM

## 2024-04-16 LAB
ERYTHROCYTE [DISTWIDTH] IN BLOOD BY AUTOMATED COUNT: 14.1 % (ref 10–15)
HBA1C MFR BLD: 5.9 % (ref 0–5.6)
HCT VFR BLD AUTO: 47.2 % (ref 40–53)
HGB BLD-MCNC: 16.3 G/DL (ref 13.3–17.7)
MCH RBC QN AUTO: 33.1 PG (ref 26.5–33)
MCHC RBC AUTO-ENTMCNC: 34.5 G/DL (ref 31.5–36.5)
MCV RBC AUTO: 96 FL (ref 78–100)
PLATELET # BLD AUTO: 234 10E3/UL (ref 150–450)
RBC # BLD AUTO: 4.93 10E6/UL (ref 4.4–5.9)
WBC # BLD AUTO: 7.8 10E3/UL (ref 4–11)

## 2024-04-16 PROCEDURE — G0103 PSA SCREENING: HCPCS | Performed by: NURSE PRACTITIONER

## 2024-04-16 PROCEDURE — 83036 HEMOGLOBIN GLYCOSYLATED A1C: CPT | Performed by: NURSE PRACTITIONER

## 2024-04-16 PROCEDURE — 82043 UR ALBUMIN QUANTITATIVE: CPT | Performed by: NURSE PRACTITIONER

## 2024-04-16 PROCEDURE — 82570 ASSAY OF URINE CREATININE: CPT | Performed by: NURSE PRACTITIONER

## 2024-04-16 PROCEDURE — 85027 COMPLETE CBC AUTOMATED: CPT | Performed by: NURSE PRACTITIONER

## 2024-04-16 PROCEDURE — 99397 PER PM REEVAL EST PAT 65+ YR: CPT | Performed by: NURSE PRACTITIONER

## 2024-04-16 PROCEDURE — 80061 LIPID PANEL: CPT | Performed by: NURSE PRACTITIONER

## 2024-04-16 PROCEDURE — 36415 COLL VENOUS BLD VENIPUNCTURE: CPT | Performed by: NURSE PRACTITIONER

## 2024-04-16 PROCEDURE — 80053 COMPREHEN METABOLIC PANEL: CPT | Performed by: NURSE PRACTITIONER

## 2024-04-16 PROCEDURE — 99214 OFFICE O/P EST MOD 30 MIN: CPT | Mod: 25 | Performed by: NURSE PRACTITIONER

## 2024-04-16 NOTE — PROGRESS NOTES
Assessment & Plan     Type 2 diabetes mellitus without complication, without long-term current use of insulin (H)  *  - HEMOGLOBIN A1C  - BASIC METABOLIC PANEL  - COMPREHENSIVE METABOLIC PANEL  - Lipid panel reflex to direct LDL Non-fasting  - Albumin Random Urine Quantitative with Creat Ratio  - CBC with Platelets  - HEMOGLOBIN A1C  - COMPREHENSIVE METABOLIC PANEL  - Lipid panel reflex to direct LDL Non-fasting  - Albumin Random Urine Quantitative with Creat Ratio  - CBC with Platelets  - losartan (COZAAR) 25 MG tablet  Dispense: 90 tablet; Refill: 3  - empagliflozin (JARDIANCE) 10 MG TABS tablet  Dispense: 90 tablet; Refill: 3    Screening for prostate cancer  **  - PROSTATE SPEC ANTIGEN SCREEN  - PROSTATE SPEC ANTIGEN SCREEN    Diabetic polyneuropathy associated with type 2 diabetes mellitus (H)  **  - CBC with Platelets  - CBC with Platelets  - losartan (COZAAR) 25 MG tablet  Dispense: 90 tablet; Refill: 3    Hyperlipidemia LDL goal <70  **    Obstructive sleep apnea syndrome  **    Medicare annual wellness visit, subsequent  **    Hypertension, unspecified type  **  - losartan (COZAAR) 25 MG tablet  Dispense: 90 tablet; Refill: 3    - overall labs have improved and are stable. Your The Microalbumin/creatinine ratio urine test looks for very small levels of protein in the urine and is used to detect early signs of kidney damage.  Kidney damage can cause proteins to leak through the kidneys and exit the body to the kidneys.  Albumin is one of the first proteins to leak when the kidneys are damaged.  One way that the kidneys can be damaged is by diabetes.     Your microalbumin ratio was 59; normal is less than 30.     Ways to keep this lab under control is by controlling your diabetes and blood pressure. This will help protect your kidneys. Please repeat this test in one year at your next preventive exam.     - BP and BMP stable, can continue Losartan.   - A1c is better! Please continue Metformin and Jardiance.  "  - Lipids are stable, please continue Crestor.  -  declined sleep study;   I discussed the benefits of diagnosing sleep apnea and offered to repeat the study and he declined .  I discussed conservative management to help prevent against sleep apnea today               BMI  Estimated body mass index is 31.12 kg/m  as calculated from the following:    Height as of this encounter: 1.651 m (5' 5\").    Weight as of this encounter: 84.8 kg (187 lb).   Weight management plan: Discussed healthy diet and exercise guidelines      Silvestre Wilder is a 63 year old, presenting for the following health issues:  Diabetes, Recheck Medication, and RECHECK      4/16/2024     2:39 PM   Additional Questions   Roomed by Viktor     - now using VigLink burak and has a watch that monitors his blood sugars. He still plays in a band. He declined sleep study repeat test. Denied any hypoglycemic events.    Via the Health Maintenance questionnaire, the patient has reported the following services have been completed -Eye Exam, this information has been sent to the abstraction team.  History of Present Illness       Diabetes:   He presents for follow up of diabetes.  He is checking home blood glucose four or more times daily.   He checks blood glucose before and after meals and at bedtime.  Blood glucose is never over 200 and never under 70.     He has no concerns regarding his diabetes at this time.  He is having numbness in feet.  The patient has had a diabetic eye exam in the last 12 months. Eye exam performed on 07-. Location of last eye exam Eye clinic.        He eats 0-1 servings of fruits and vegetables daily.He consumes 0 sweetened beverage(s) daily.He exercises with enough effort to increase his heart rate 20 to 29 minutes per day.  He exercises with enough effort to increase his heart rate 3 or less days per week.   He is taking medications regularly.           Annual Wellness Visit     Patient has been advised of split billing " requirements and indicates understanding: Yes         Health Care Directive  Patient has a Health Care Directive on file  Advance care planning document is on file and is current.  In general, how would you rate your overall physical health? (!) FAIR   Discussed with patient their rating of physical health; information has been provided.   Do you have a special diet?  Diabetic        2024   Exercise, Social Connection, Stress   Days per week of moderate/strenous exercise 3 days   Average minutes spent exercising at this level 20 min   Frequency of gathering with friends or relatives Three times   Feel stress (tense, anxious, or unable to sleep) Not at all     Do you see a dentist two times every year?  Yes  Have you been more tired than usual lately?  No  If you drink alcohol do you typically have >3 drinks per day or >7 drinks per week? No  Do you have a current opioid prescription? No  Do you use any other controlled substances or medications that are not prescribed by a provider? None  Social History     Tobacco Use    Smoking status: Former     Current packs/day: 0.00     Types: Cigarettes     Quit date: 2009     Years since quittin.4     Passive exposure: Never    Smokeless tobacco: Former     Quit date: 2010   Vaping Use    Vaping status: Never Used   Substance Use Topics    Alcohol use: Yes     Alcohol/week: 4.0 standard drinks of alcohol     Types: 1 Glasses of wine, 3 Cans of beer per week     Comment: occassional; 2-3 beers per week    Drug use: No       Needs assistance for the following daily activities: no assistance needed  Which of the following safety concerns are present in your home?  none identified   Do you (or your family members) have any concerns about your safety while driving?  No  Do you have any of the following hearing concerns?: No hearing concerns  In the past 6 months, have you been bothered by leaking of urine? No        10/13/2023   Social Factors   Worry food  won't last until get money to buy more No   Food not last or not have enough money for food? No   Do you have housing?  Yes   Are you worried about losing your housing? Yes   Lack of transportation? No   Unable to get utilities (heat,electricity)? No         11/15/2023   Fall Risk   Fallen 2 or more times in the past year? No          Today's PHQ-2 Score:       2024     1:45 AM   PHQ-2 (  [employer])   Q1: Little interest or pleasure in doing things 0   Q2: Feeling down, depressed or hopeless 1   PHQ-2 Score 1   Q1: Little interest or pleasure in doing things Not at all   Q2: Feeling down, depressed or hopeless Several days   PHQ-2 Score 1       Last PSA:   PSA   Date Value Ref Range Status   10/15/2020 0.24 0 - 4 ug/L Final     Comment:     Assay Method:  Chemiluminescence using Siemens Vista analyzer     Prostate Specific Antigen Screen   Date Value Ref Range Status   2024 0.41 0.00 - 4.50 ng/mL Final   10/28/2021 0.34 0.00 - 4.00 ug/L Final     ASCVD Risk   The ASCVD Risk score (Estrella AGUILAR, et al., 2019) failed to calculate for the following reasons:    The valid total cholesterol range is 130 to 320 mg/dL    Fracture Risk Assessment Tool  Link to Frax Calculator  Use the information below to complete the Frax calculator  : 1960  Sex: male  Weight (kg): 84.8 kg (actual weight)  Height (cm): 165.1 cm  Previous Fragility Fracture:  No  History of parent with fractured hip:  No  Current Smoking:  No  Patient has been on glucocorticoids for more than 3 months (5mg/day or more): Yes  Rheumatoid Arthritis on Problem List:  No  Secondary Osteoporosis on Problem List:  No  Consumes 3 or more units of alcohol per day: Yes  Femoral Neck BMD (g/cm2)            Reviewed and updated as needed this visit by Provider   Tobacco                  Past Medical History:   Diagnosis Date    Diabetes (H)     type 2    Family history of early CAD 2011    Generalized anxiety disorder     occasional  anxiety -usually when tired    H/O knee surgery 08/03/2012    right knee arthroscopic partial medial meniscectomy    History of gout 10/21/2015    Irregular heart beat     Osteoarthritis     knee    Other and unspecified derangement of medial meniscus 06/06/2012    Palpitations 1990s    negative w/u incld holter monitor    PONV (postoperative nausea and vomiting)     Sleep apnea     Does not use a CPAP     Past Surgical History:   Procedure Laterality Date    ARTHROPLASTY KNEE Left 03/23/2021    Procedure: Left total knee arthroplasty (Omalley and Nephew #5 narrow PCR femur; #3 tibia; 32 mm patella and 9 mm tibial insert);  Surgeon: Shantanu Washburn MD;  Location: [facility] OR    COLONOSCOPY  1    HC TOOTH EXTRACTION W/FORCEP  1976    wisdom tooth x 4    KNEE SURGERY Right 2005     Lab work is in process  Labs reviewed in EPIC    Current providers sharing in care for this patient include:  Patient Care Team:  Angela Limon APRN CNP as PCP - General (Family Medicine)  Teresa Adame OD as Assigned Surgical Provider  Angela Limon APRN CNP as Assigned PCP  Yola Brito RN as Lead Care Coordinator (Primary Care - CC)    The following health maintenance items are reviewed in Epic and correct as of today:  Health Maintenance   Topic Date Due    LUNG CANCER SCREENING  Never done    RSV VACCINE (Pregnancy & 60+) (1 - 1-dose 60+ series) Never done    DIABETIC FOOT EXAM  10/28/2022    MEDICARE ANNUAL WELLNESS VISIT  07/04/2023    COVID-19 Vaccine (3 - 2023-24 season) 09/01/2023    EYE EXAM  02/16/2024    ZOSTER IMMUNIZATION (2 of 2) 01/03/2024    A1C  07/16/2024    ANNUAL REVIEW OF HM ORDERS  10/13/2024    FALL RISK ASSESSMENT  11/09/2024    BMP  04/16/2025    CMP  04/16/2025    LIPID  04/16/2025    MICROALBUMIN  04/16/2025    PSA  04/16/2025    CBC  04/16/2025    COLORECTAL CANCER SCREENING  10/12/2025    ADVANCE CARE PLANNING  04/16/2029    DTAP/TDAP/TD IMMUNIZATION (3 - Td or Tdap) 10/15/2030    PHQ-2 (once  "per calendar year)  Completed    INFLUENZA VACCINE  Completed    Pneumococcal Vaccine: 65+ Years  Completed    AORTIC ANEURYSM SCREENING (SYSTEM ASSIGNED)  Completed    IPV IMMUNIZATION  Aged Out    HPV IMMUNIZATION  Aged Out    MENINGITIS IMMUNIZATION  Aged Out    RSV MONOCLONAL ANTIBODY  Aged Out    HEPATITIS C SCREENING  Discontinued    HIV SCREENING  Discontinued       Appropriate preventive services were discussed with this patient, including applicable screening as appropriate for fall prevention, nutrition, physical activity, Tobacco-use cessation, weight loss and cognition.  Checklist reviewing preventive services available has been given to the patient.          4/19/2024   Mini Cog   Clock Draw Score 2 Normal   3 Item Recall 3 objects recalled   Mini Cog Total Score 5         Review of Systems  Constitutional, HEENT, cardiovascular, pulmonary, gi and gu systems are negative, except as otherwise noted.      Objective    /82   Pulse 77   Temp 98.5  F (36.9  C) (Oral)   Resp 14   Ht 1.651 m (5' 5\")   Wt 84.8 kg (187 lb)   SpO2 95%   BMI 31.12 kg/m    Body mass index is 31.12 kg/m .  Physical Exam   GENERAL: alert and no distress  EYES: Eyes grossly normal to inspection, PERRL and conjunctivae and sclerae normal  NECK: no adenopathy, no asymmetry, masses, or scars  RESP: lungs clear to auscultation - no rales, rhonchi or wheezes  CV: regular rate and rhythm, normal S1 S2, no S3 or S4, no murmur, click or rub, no peripheral edema  ABDOMEN: soft, nontender, no hepatosplenomegaly, no masses and bowel sounds normal  MS: no gross musculoskeletal defects noted, no edema  SKIN: no suspicious lesions or rashes  NEURO: Normal strength and tone, mentation intact and speech normal  PSYCH: mentation appears normal, affect normal/bright    Results for orders placed or performed in visit on 04/16/24   HEMOGLOBIN A1C     Status: Abnormal   Result Value Ref Range    Hemoglobin A1C 5.9 (H) 0.0 - 5.6 % "   COMPREHENSIVE METABOLIC PANEL     Status: Abnormal   Result Value Ref Range    Sodium 142 135 - 145 mmol/L    Potassium 4.1 3.4 - 5.3 mmol/L    Carbon Dioxide (CO2) 27 22 - 29 mmol/L    Anion Gap 11 7 - 15 mmol/L    Urea Nitrogen 24.3 (H) 8.0 - 23.0 mg/dL    Creatinine 0.98 0.67 - 1.17 mg/dL    GFR Estimate 86 >60 mL/min/1.73m2    Calcium 10.1 8.8 - 10.2 mg/dL    Chloride 104 98 - 107 mmol/L    Glucose 142 (H) 70 - 99 mg/dL    Alkaline Phosphatase 68 40 - 150 U/L    AST 23 0 - 45 U/L    ALT 26 0 - 70 U/L    Protein Total 7.4 6.4 - 8.3 g/dL    Albumin 4.5 3.5 - 5.2 g/dL    Bilirubin Total 0.5 <=1.2 mg/dL   Lipid panel reflex to direct LDL Non-fasting     Status: Abnormal   Result Value Ref Range    Cholesterol 127 <200 mg/dL    Triglycerides 193 (H) <150 mg/dL    Direct Measure HDL 49 >=40 mg/dL    LDL Cholesterol Calculated 39 <=100 mg/dL    Non HDL Cholesterol 78 <130 mg/dL    Patient Fasting > 8hrs? Unknown     Narrative    Cholesterol  Desirable:  <200 mg/dL    Triglycerides  Normal:  Less than 150 mg/dL  Borderline High:  150-199 mg/dL  High:  200-499 mg/dL  Very High:  Greater than or equal to 500 mg/dL    Direct Measure HDL  Female:  Greater than or equal to 50 mg/dL   Male:  Greater than or equal to 40 mg/dL    LDL Cholesterol  Desirable:  <100mg/dL  Above Desirable:  100-129 mg/dL   Borderline High:  130-159 mg/dL   High:  160-189 mg/dL   Very High:  >= 190 mg/dL    Non HDL Cholesterol  Desirable:  130 mg/dL  Above Desirable:  130-159 mg/dL  Borderline High:  160-189 mg/dL  High:  190-219 mg/dL  Very High:  Greater than or equal to 220 mg/dL   Albumin Random Urine Quantitative with Creat Ratio     Status: Abnormal   Result Value Ref Range    Creatinine Urine mg/dL 89.6 mg/dL    Albumin Urine mg/L 53.3 mg/L    Albumin Urine mg/g Cr 59.49 (H) 0.00 - 17.00 mg/g Cr   PROSTATE SPEC ANTIGEN SCREEN     Status: Normal   Result Value Ref Range    Prostate Specific Antigen Screen 0.41 0.00 - 4.50 ng/mL    Narrative     This result is obtained using the Roche Elecsys total PSA method on the fredo e801 immunoassay analyzer. Results obtained with different assay methods or kits cannot be used interchangeably.   CBC with Platelets     Status: Abnormal   Result Value Ref Range    WBC Count 7.8 4.0 - 11.0 10e3/uL    RBC Count 4.93 4.40 - 5.90 10e6/uL    Hemoglobin 16.3 13.3 - 17.7 g/dL    Hematocrit 47.2 40.0 - 53.0 %    MCV 96 78 - 100 fL    MCH 33.1 (H) 26.5 - 33.0 pg    MCHC 34.5 31.5 - 36.5 g/dL    RDW 14.1 10.0 - 15.0 %    Platelet Count 234 150 - 450 10e3/uL     No results found for this or any previous visit (from the past 24 hour(s)).        Signed Electronically by: LILI Clark CNP

## 2024-04-17 LAB
ALBUMIN SERPL BCG-MCNC: 4.5 G/DL (ref 3.5–5.2)
ALP SERPL-CCNC: 68 U/L (ref 40–150)
ALT SERPL W P-5'-P-CCNC: 26 U/L (ref 0–70)
ANION GAP SERPL CALCULATED.3IONS-SCNC: 11 MMOL/L (ref 7–15)
AST SERPL W P-5'-P-CCNC: 23 U/L (ref 0–45)
BILIRUB SERPL-MCNC: 0.5 MG/DL
BUN SERPL-MCNC: 24.3 MG/DL (ref 8–23)
CALCIUM SERPL-MCNC: 10.1 MG/DL (ref 8.8–10.2)
CHLORIDE SERPL-SCNC: 104 MMOL/L (ref 98–107)
CHOLEST SERPL-MCNC: 127 MG/DL
CREAT SERPL-MCNC: 0.98 MG/DL (ref 0.67–1.17)
CREAT UR-MCNC: 89.6 MG/DL
DEPRECATED HCO3 PLAS-SCNC: 27 MMOL/L (ref 22–29)
EGFRCR SERPLBLD CKD-EPI 2021: 86 ML/MIN/1.73M2
FASTING STATUS PATIENT QL REPORTED: ABNORMAL
GLUCOSE SERPL-MCNC: 142 MG/DL (ref 70–99)
HDLC SERPL-MCNC: 49 MG/DL
LDLC SERPL CALC-MCNC: 39 MG/DL
MICROALBUMIN UR-MCNC: 53.3 MG/L
MICROALBUMIN/CREAT UR: 59.49 MG/G CR (ref 0–17)
NONHDLC SERPL-MCNC: 78 MG/DL
POTASSIUM SERPL-SCNC: 4.1 MMOL/L (ref 3.4–5.3)
PROT SERPL-MCNC: 7.4 G/DL (ref 6.4–8.3)
PSA SERPL DL<=0.01 NG/ML-MCNC: 0.41 NG/ML (ref 0–4.5)
SODIUM SERPL-SCNC: 142 MMOL/L (ref 135–145)
TRIGL SERPL-MCNC: 193 MG/DL

## 2024-04-19 RX ORDER — LOSARTAN POTASSIUM 25 MG/1
25 TABLET ORAL DAILY
Qty: 90 TABLET | Refills: 3 | Status: SHIPPED | OUTPATIENT
Start: 2024-04-19

## 2024-04-19 NOTE — RESULT ENCOUNTER NOTE
HI     It was good to see you again!    Overall labs have improved and are stable. Your The Microalbumin/creatinine ratio urine test looks for very small levels of protein in the urine and is used to detect early signs of kidney damage.  Kidney damage can cause proteins to leak through the kidneys and exit the body to the kidneys.  Albumin is one of the first proteins to leak when the kidneys are damaged.  One way that the kidneys can be damaged is by diabetes.     Your microalbumin ratio was 59; normal is less than 30.     Ways to keep this lab under control is by controlling your diabetes and blood pressure. This will help protect your kidneys. Please repeat this test in one year at your next preventive exam.     - BP and BMP stable, can continue Losartan.   - A1c is better! Please continue Metformin and Jardiance.   - Lipids are stable, please continue Crestor.    LILI Clark CNP

## 2024-04-23 ENCOUNTER — PATIENT OUTREACH (OUTPATIENT)
Dept: GERIATRIC MEDICINE | Facility: CLINIC | Age: 64
End: 2024-04-23
Payer: COMMERCIAL

## 2024-04-23 NOTE — LETTER
April 23, 2024    ARMANDO GILL  [address] [place] [address] [place] [id number]      Dear Armando,    Welcome to [facility] health program. My name is Yola Brito RN, PHN. I am your [facility] care coordinator. You're eligible for care coordination through your [facility] Product.    As your care coordinator, we ll:  Meet to go over your care coordination benefits  Talk about your physical and mental health care needs   Review your preventative care needs  Create a plan that meets your needs with the services you choose    What happens next?  I ll call you soon to introduce myself and tell you more about my role. We ll then plan time to go over your health and safety needs. Our goal is to keep you as healthy and independent as possible.    [facility] combines the benefits you may already have receive from Medical Assistance, Medicare and the Prescription Drug Coverage Program. Soon you'll receive a new member identification (ID) card from [facility]. Use this card whenever you get health services.    The [facility] care coordination program is voluntary and offered to you at no cost. If you wish to stop being in the care coordination program or have questions, call me at [phone]. If you reach my voicemail, leave a message and your phone number. TTY users, call the [place] [employer] at 646 or [phone] (gbxbyf-ol-jsoxna relay service).    Sincerely,      Yola Brito RN, PHN  [phone]  [email or web address]      [facility] ([facility]) is a health plan that contracts with both Medicare and the [place] Medical Assistance (Medicaid) program to provide benefits of both programs to enrollees. Enrollment in [facility] depends on contract renewal.    [id number] accepted   [id number]         [id number] (07/2022)

## 2024-05-08 ENCOUNTER — PATIENT OUTREACH (OUTPATIENT)
Dept: GERIATRIC MEDICINE | Facility: CLINIC | Age: 64
End: 2024-05-08
Payer: COMMERCIAL

## 2024-05-08 NOTE — PROGRESS NOTES
[facility] Care Coordination Contact      [facility] Health Plan or Product Change    CC received notification that member's health plan or health plan product changed from [facility] to [facility] effective 4/1/2024.  CC contacted member and discussed change and face-to-face visit was offered. Member declined need for home visit. CC reviewed previous Health Risk Assessment/LTCC and POC with member and no changes noted.    Called member and introduced self as member s new CC. Confirmed with member that the welcome letter with writer's name and contact information has been received.  Reviewed LTCC/Health Risk Assessment (HRA) and POC with member. No changes noted.  Transitional HRA completed. Care Plan Summary updated and reflects current services.  Required referral authorization information communicated to CMS: Not applicable  Writer reviewed the following with member:  ER visits: No  Hospitalizations: No  TCU stays: No  Significant health status changes: None.  Falls/Injuries: No  ADL/IADL changes: No  Changes in services: No    Follow-Up Plan: Member informed of future contact, plan to f/u with member with at next regularly scheduled contact.  Contact information shared with member and family, encouraged member to call with any questions or concerns.    Yola Brito RN, BSN, PHN  [facility]  Phone: [phone]

## 2024-05-24 ENCOUNTER — TELEPHONE (OUTPATIENT)
Dept: FAMILY MEDICINE | Facility: CLINIC | Age: 64
End: 2024-05-24
Payer: COMMERCIAL

## 2024-05-24 DIAGNOSIS — E11.9 TYPE 2 DIABETES MELLITUS WITHOUT COMPLICATION, WITHOUT LONG-TERM CURRENT USE OF INSULIN (H): ICD-10-CM

## 2024-05-24 NOTE — TELEPHONE ENCOUNTER
Prescription currently at [facility], please confirm pharmacy pt wants.  Sofia Turner BSN, RN

## 2024-05-24 NOTE — TELEPHONE ENCOUNTER
Writer left voicemail for pt. Requesting call back.    Any staff able to ask pt which pharmacy they would like their empagliflozin (jardiance) filled at.     Pt's meds were initially sent to [facility] on [address] on 04/19/2024. Refill request sent from [facility] on [address] today.     If pt would like to have med filled at [facility], please note this and route back to [place] medication refill pool.     Sarah Velarde RN

## 2024-05-24 NOTE — TELEPHONE ENCOUNTER
Pending Prescriptions:                       Disp   Refills    empagliflozin (JARDIANCE) 10 MG TABS tabl*90 tab*3            Sig: Take 1 tablet (10 mg) by mouth every morning    Pharmacy:    [facility] #[id number] - [facility] [address]

## 2024-05-29 NOTE — TELEPHONE ENCOUNTER
Patient called back and indicated that he would like his Rx sent to the [facility] on [address]:

## 2024-08-10 ENCOUNTER — HEALTH MAINTENANCE LETTER (OUTPATIENT)
Age: 64
End: 2024-08-10

## 2024-08-28 ENCOUNTER — PATIENT OUTREACH (OUTPATIENT)
Dept: GERIATRIC MEDICINE | Facility: CLINIC | Age: 64
End: 2024-08-28
Payer: COMMERCIAL

## 2024-08-28 NOTE — PROGRESS NOTES
[facility] Care Coordination Contact    No longer active with [facility] community case management effective 8/1/2024.  Reason for community disenrollment: MA Radha.    Yola Brito RN, BSN, PHN  [facility]  Phone: [phone]

## 2024-12-22 ENCOUNTER — HEALTH MAINTENANCE LETTER (OUTPATIENT)
Age: 64
End: 2024-12-22

## 2025-04-12 ENCOUNTER — HEALTH MAINTENANCE LETTER (OUTPATIENT)
Age: 65
End: 2025-04-12

## 2025-04-17 SDOH — HEALTH STABILITY: PHYSICAL HEALTH: ON AVERAGE, HOW MANY DAYS PER WEEK DO YOU ENGAGE IN MODERATE TO STRENUOUS EXERCISE (LIKE A BRISK WALK)?: 4 DAYS

## 2025-04-17 SDOH — HEALTH STABILITY: PHYSICAL HEALTH: ON AVERAGE, HOW MANY MINUTES DO YOU ENGAGE IN EXERCISE AT THIS LEVEL?: 60 MIN

## 2025-04-17 ASSESSMENT — SOCIAL DETERMINANTS OF HEALTH (SDOH): HOW OFTEN DO YOU GET TOGETHER WITH FRIENDS OR RELATIVES?: THREE TIMES A WEEK

## 2025-04-21 ENCOUNTER — TELEPHONE (OUTPATIENT)
Dept: FAMILY MEDICINE | Facility: CLINIC | Age: 65
End: 2025-04-21
Payer: COMMERCIAL

## 2025-04-21 NOTE — TELEPHONE ENCOUNTER
General Call    Contacts       Contact Date/Time Type Contact Phone/Fax    04/21/2025 10:21 AM CDT Phone (Incoming) [facility]ol[facility] (Other) [phone]          Reason for Call: A rep from [facility] called in requesting a verbal confirmation - verification if the pt has diabetes, chronic heart failure or cardiovascular disease.         Could we send this information to you in [employer] or would you prefer to receive a phone call?:   Patient would prefer a phone call   Okay to leave a detailed message?: Yes at Other phone number: Jolynn - [facility] - [phone]- ref # [id number]

## 2025-04-22 ENCOUNTER — OFFICE VISIT (OUTPATIENT)
Dept: FAMILY MEDICINE | Facility: CLINIC | Age: 65
End: 2025-04-22
Attending: NURSE PRACTITIONER
Payer: COMMERCIAL

## 2025-04-22 VITALS
DIASTOLIC BLOOD PRESSURE: 89 MMHG | TEMPERATURE: 98.5 F | SYSTOLIC BLOOD PRESSURE: 149 MMHG | OXYGEN SATURATION: 95 % | BODY MASS INDEX: 33.77 KG/M2 | WEIGHT: 202.7 LBS | RESPIRATION RATE: 20 BRPM | HEIGHT: 65 IN | HEART RATE: 85 BPM

## 2025-04-22 DIAGNOSIS — Z87.891 FORMER CIGARETTE SMOKER: ICD-10-CM

## 2025-04-22 DIAGNOSIS — Z13.29 SCREENING FOR THYROID DISORDER: ICD-10-CM

## 2025-04-22 DIAGNOSIS — E11.42 DIABETIC POLYNEUROPATHY ASSOCIATED WITH TYPE 2 DIABETES MELLITUS (H): ICD-10-CM

## 2025-04-22 DIAGNOSIS — Z00.00 MEDICARE ANNUAL WELLNESS VISIT, SUBSEQUENT: Primary | ICD-10-CM

## 2025-04-22 DIAGNOSIS — Z13.6 SCREENING FOR CARDIOVASCULAR CONDITION: ICD-10-CM

## 2025-04-22 DIAGNOSIS — G47.9 SLEEP DIFFICULTIES: ICD-10-CM

## 2025-04-22 DIAGNOSIS — Z23 NEED FOR VACCINATION: ICD-10-CM

## 2025-04-22 DIAGNOSIS — Z12.5 SCREENING FOR PROSTATE CANCER: ICD-10-CM

## 2025-04-22 DIAGNOSIS — Z82.49 FAMILY HISTORY OF ABDOMINAL AORTIC ANEURYSM (AAA): ICD-10-CM

## 2025-04-22 DIAGNOSIS — E11.9 TYPE 2 DIABETES MELLITUS WITHOUT COMPLICATION, WITHOUT LONG-TERM CURRENT USE OF INSULIN (H): ICD-10-CM

## 2025-04-22 DIAGNOSIS — G47.33 OBSTRUCTIVE SLEEP APNEA SYNDROME: ICD-10-CM

## 2025-04-22 DIAGNOSIS — E11.29 TYPE 2 DIABETES MELLITUS WITH ALBUMINURIA (H): ICD-10-CM

## 2025-04-22 DIAGNOSIS — I10 HYPERTENSION, UNSPECIFIED TYPE: ICD-10-CM

## 2025-04-22 DIAGNOSIS — R80.9 TYPE 2 DIABETES MELLITUS WITH ALBUMINURIA (H): ICD-10-CM

## 2025-04-22 DIAGNOSIS — Z00.00 ENCOUNTER FOR MEDICARE ANNUAL WELLNESS EXAM: ICD-10-CM

## 2025-04-22 DIAGNOSIS — E78.5 HYPERLIPIDEMIA LDL GOAL <70: ICD-10-CM

## 2025-04-22 LAB
ERYTHROCYTE [DISTWIDTH] IN BLOOD BY AUTOMATED COUNT: 12.7 % (ref 10–15)
EST. AVERAGE GLUCOSE BLD GHB EST-MCNC: 160 MG/DL
HBA1C MFR BLD: 7.2 % (ref 0–5.6)
HCT VFR BLD AUTO: 46.5 % (ref 40–53)
HGB BLD-MCNC: 16 G/DL (ref 13.3–17.7)
MCH RBC QN AUTO: 31.9 PG (ref 26.5–33)
MCHC RBC AUTO-ENTMCNC: 34.4 G/DL (ref 31.5–36.5)
MCV RBC AUTO: 93 FL (ref 78–100)
PLATELET # BLD AUTO: 231 10E3/UL (ref 150–450)
RBC # BLD AUTO: 5.01 10E6/UL (ref 4.4–5.9)
WBC # BLD AUTO: 7.7 10E3/UL (ref 4–11)

## 2025-04-22 PROCEDURE — 84443 ASSAY THYROID STIM HORMONE: CPT | Performed by: NURSE PRACTITIONER

## 2025-04-22 PROCEDURE — 80053 COMPREHEN METABOLIC PANEL: CPT | Performed by: NURSE PRACTITIONER

## 2025-04-22 PROCEDURE — 80061 LIPID PANEL: CPT | Performed by: NURSE PRACTITIONER

## 2025-04-22 PROCEDURE — 82043 UR ALBUMIN QUANTITATIVE: CPT | Performed by: NURSE PRACTITIONER

## 2025-04-22 PROCEDURE — 82570 ASSAY OF URINE CREATININE: CPT | Performed by: NURSE PRACTITIONER

## 2025-04-22 PROCEDURE — G0103 PSA SCREENING: HCPCS | Performed by: NURSE PRACTITIONER

## 2025-04-22 PROCEDURE — 36415 COLL VENOUS BLD VENIPUNCTURE: CPT | Performed by: NURSE PRACTITIONER

## 2025-04-22 PROCEDURE — 83036 HEMOGLOBIN GLYCOSYLATED A1C: CPT | Performed by: NURSE PRACTITIONER

## 2025-04-22 PROCEDURE — 85027 COMPLETE CBC AUTOMATED: CPT | Performed by: NURSE PRACTITIONER

## 2025-04-22 RX ORDER — LOSARTAN POTASSIUM 50 MG/1
50 TABLET ORAL DAILY
Qty: 90 TABLET | Refills: 3 | Status: SHIPPED | OUTPATIENT
Start: 2025-04-22

## 2025-04-22 NOTE — TELEPHONE ENCOUNTER
Patient has type II diabetes. Has mild elevated albuminuria as well with last labs    LILI Clark CNP

## 2025-04-22 NOTE — PROGRESS NOTES
Preventive Care Visit  [facility]  LILI Clark CNP, Family Medicine  Apr 22, 2025      Assessment & Plan     Need for vaccination  *declined    Type 2 diabetes mellitus with complication, without long-term current use of insulin (H)  *-   - Adult Eye  Referral  - HEMOGLOBIN A1C  - COMPREHENSIVE METABOLIC PANEL  - Lipid panel reflex to direct LDL Non-fasting  - Albumin Random Urine Quantitative with Creat Ratio  - CBC with Platelets  - losartan (COZAAR) 50 MG tablet  Dispense: 90 tablet; Refill: 3  - HEMOGLOBIN A1C  - COMPREHENSIVE METABOLIC PANEL  - Lipid panel reflex to direct LDL Non-fasting  - Albumin Random Urine Quantitative with Creat Ratio  - CBC with Platelets  - empagliflozin (JARDIANCE) 10 MG TABS tablet  Dispense: 90 tablet; Refill: 3  - metFORMIN (GLUCOPHAGE) 500 MG tablet  Dispense: 360 tablet; Refill: 3  - atorvastatin (LIPITOR) 20 MG tablet  Dispense: 90 tablet; Refill: 3  - **Hemoglobin A1c FUTURE 3mo  - Lipid panel reflex to direct LDL Fasting    Screening for cardiovascular condition    Screening for prostate cancer  *normal range.   - PROSTATE SPEC ANTIGEN SCREEN  - PROSTATE SPEC ANTIGEN SCREEN    Hyperlipidemia LDL goal <70  *statin stopped, refilled Lipitor. Follow up with lipids in 2-4 months.     The 10-year ASCVD risk score (Estrella DK, et al., 2019) is: 37.2%    Values used to calculate the score:      Age: 64 years      Sex: Male      Is Non- : No      Diabetic: Yes      Tobacco smoker: No      Systolic Blood Pressure: 149 mmHg      Is BP treated: Yes      HDL Cholesterol: 40 mg/dL      Total Cholesterol: 181 mg/dL    - atorvastatin (LIPITOR) 20 MG tablet  Dispense: 90 tablet; Refill: 3  - Lipid panel reflex to direct LDL Fasting    Diabetic polyneuropathy associated with type 2 diabetes mellitus (H)  *on-going tingling./numbness to bilateral toe areas. DM not under control. Stopped medications. Med refilled. Diet discussed  "along with regular exercise. Good feet care discussed.   - follow up in three months to recehck A1c   - CBC with Platelets  - losartan (COZAAR) 50 MG tablet  Dispense: 90 tablet; Refill: 3  - CBC with Platelets  - metFORMIN (GLUCOPHAGE) 500 MG tablet  Dispense: 360 tablet; Refill: 3  - **Hemoglobin A1c FUTURE 3mo    Obstructive sleep apnea syndrome  *declined retesting. Benefits and complications discussed.     Former cigarette smoker  *AAA screening done 2023. Stable below 4.0     Screening for thyroid disorder  **normal range.   - TSH  - TSH    Sleep difficulties  *chronic but overall stable    Hypertension, unspecified type  *BP elevated, checked three times at office. Weight also has increased. Will increase Losartan to 50 mg, recheck with MA in two weeks.   - losartan (COZAAR) 50 MG tablet  Dispense: 90 tablet; Refill: 3  Medicare annual wellness visit, subsequent  *    Family history of abdominal aortic aneurysm (AAA)  *AAA done 2023. Quit smoking.     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 Meds refilled. Weight increased too, this was discussed as well. He is starting to use food burak again. Repeat A1c in three months. Start Metformin slowly to prevent GI upset.     - empagliflozin (JARDIANCE) 10 MG TABS tablet  Dispense: 90 tablet; Refill: 3  - **Hemoglobin A1c FUTURE 3mo  - Lipid panel reflex to direct LDL Fasting      Patient has been advised of split billing requirements and indicates understanding: Yes        BMI  Estimated body mass index is 33.73 kg/m  as calculated from the following:    Height as of this encounter: 1.651 m (5' 5\").    Weight as of this encounter: 91.9 kg (202 lb 11.2 oz).   Weight management plan: Discussed healthy diet and exercise guidelines    Counseling  Appropriate preventive services were addressed with this patient via screening, questionnaire, or discussion as appropriate for fall prevention, nutrition, physical activity, " Tobacco-use cessation, social engagement, weight loss and cognition.  Checklist reviewing preventive services available has been given to the patient.  Reviewed patient's diet, addressing concerns and/or questions.   He is at risk for psychosocial distress and has been provided with information to reduce risk.   Discussed possible causes of fatigue. Patient reported safety concerns were addressed today.The patient was provided with written information regarding signs of hearing loss.     Silvestre Wilder is a 64 year old, presenting for the following:  Physical (Follow up. Metformin prescription. Viagra prescription. /)    BP home 125/77.       4/22/2025     2:41 PM   Additional Questions   Roomed by Jackie   Accompanied by Self           HPI  Overall doing well. He stopped his medications for DM and statin. No clear reason why, maybe insurance or expense. He is still taking BP medication. His weight has increased but has plans to restart his food diary, eat more protein, etc. His mental health is stable. Declined to get sleep study done again. Non-smoker.            Advance Care Planning    Discussed advance care planning with patient; however, patient declined at this time.        4/17/2025   General Health   How would you rate your overall physical health? (!) FAIR   Feel stress (tense, anxious, or unable to sleep) Rather much   (!) STRESS CONCERN      4/17/2025   Nutrition   Diet: Diabetic    Carbohydrate counting       Multiple values from one day are sorted in reverse-chronological order         4/17/2025   Exercise   Days per week of moderate/strenous exercise 4 days   Average minutes spent exercising at this level 60 min         4/17/2025   Social Factors   Frequency of gathering with friends or relatives Three times a week   Worry food won't last until get money to buy more No   Food not last or not have enough money for food? No   Do you have housing? (Housing is defined as stable permanent housing and  does not include staying ouside in a car, in a tent, in an abandoned building, in an overnight shelter, or couch-surfing.) Yes   Are you worried about losing your housing? Yes   Lack of transportation? No   Unable to get utilities (heat,electricity)? No   Want help with housing or utility concern? (!) YES   (!) HOUSING CONCERN PRESENT      4/22/2025   Fall Risk   Gait Speed Test (Document in seconds) 6   Gait Speed Test Interpretation Greater than 5.01 seconds - ABNORMAL          4/17/2025   Activities of Daily Living- Home Safety   Needs help with the following daily activites None of the above   Safety concerns in the home No grab bars in the bathroom         4/17/2025   Dental   Dentist two times every year? Yes         4/17/2025   Hearing Screening   Hearing concerns? (!) I NEED TO ASK PEOPLE TO SPEAK UP OR REPEAT THEMSELVES.    (!) IT'S HARD TO FOLLOW A CONVERSATION IN A NOISY RESTAURANT OR CROWDED ROOM.    (!) TROUBLE UNDERSTANDING SOFT OR WHISPERED SPEECH.       Multiple values from one day are sorted in reverse-chronological order         4/17/2025   Driving Risk Screening   Patient/family members have concerns about driving No         4/17/2025   General Alertness/Fatigue Screening   Have you been more tired than usual lately? (!) YES         4/17/2025   Urinary Incontinence Screening   Bothered by leaking urine in past 6 months No         Today's PHQ-2 Score:       4/22/2025     2:29 PM   PHQ-2 ( 1999 Pfizer)   Q1: Little interest or pleasure in doing things 0   Q2: Feeling down, depressed or hopeless 0   PHQ-2 Score 0    Q1: Little interest or pleasure in doing things Not at all   Q2: Feeling down, depressed or hopeless Not at all   PHQ-2 Score 0       Patient-reported           4/17/2025   Substance Use   Alcohol more than 3/day or more than 7/wk No   Do you have a current opioid prescription? No   How severe/bad is pain from 1 to 10? 8/10   Do you use any other substances recreationally? (!) CANNABIS  PRODUCTS     Social History     Tobacco Use    Smoking status: Former     Current packs/day: 0.00     Types: Cigarettes     Quit date: 11/26/2009     Years since quitting: 15.4     Passive exposure: Never    Smokeless tobacco: Former     Quit date: 1/2/2010   Vaping Use    Vaping status: Never Used   Substance Use Topics    Alcohol use: Yes     Alcohol/week: 4.0 standard drinks of alcohol     Types: 1 Glasses of wine, 3 Cans of beer per week     Comment: occassional; 2-3 beers per week    Drug use: No           4/17/2025   AAA Screening   Family history of Abdominal Aortic Aneurysm (AAA)? (!) YES    Last PSA:   PSA   Date Value Ref Range Status   10/15/2020 0.24 0 - 4 ug/L Final     Comment:     Assay Method:  Chemiluminescence using Siemens Vista analyzer     Prostate Specific Antigen Screen   Date Value Ref Range Status   04/22/2025 0.44 0.00 - 4.50 ng/mL Final   10/28/2021 0.34 0.00 - 4.00 ug/L Final     ASCVD Risk   The 10-year ASCVD risk score (Estrella AGUILAR, et al., 2019) is: 37.2%    Values used to calculate the score:      Age: 64 years      Sex: Male      Is Non- : No      Diabetic: Yes      Tobacco smoker: No      Systolic Blood Pressure: 149 mmHg      Is BP treated: Yes      HDL Cholesterol: 40 mg/dL      Total Cholesterol: 181 mg/dL            Reviewed and updated as needed this visit by Provider                    Lab work is in process  Labs reviewed in EPIC  Current providers sharing in care for this patient include:  Patient Care Team:  Angela Limon APRN CNP as PCP - General (Family Medicine)  Angela Limon APRN CNP as Assigned PCP    The following health maintenance items are reviewed in Epic and correct as of today:  Health Maintenance   Topic Date Due    LUNG CANCER SCREENING  Never done    RSV VACCINE (1 - Risk 60-74 years 1-dose series) Never done    DIABETIC FOOT EXAM  10/28/2022    ZOSTER IMMUNIZATION (2 of 2) 01/03/2024    EYE EXAM  02/16/2024    A1C   "07/16/2024    INFLUENZA VACCINE (1) 09/01/2024    COVID-19 Vaccine (3 - 2024-25 season) 09/01/2024    ANNUAL REVIEW OF HM ORDERS  10/13/2024    BMP  04/16/2025    CMP  04/16/2025    LIPID  04/16/2025    MICROALBUMIN  04/16/2025    PSA  04/16/2025    CBC  04/16/2025    MEDICARE ANNUAL WELLNESS VISIT  04/16/2025    COLORECTAL CANCER SCREENING  10/12/2025    FALL RISK ASSESSMENT  04/22/2026    ADVANCE CARE PLANNING  04/19/2029    DTAP/TDAP/TD IMMUNIZATION (3 - Td or Tdap) 10/15/2030    PHQ-2 (once per calendar year)  Completed    Pneumococcal Vaccine: 50+ Years  Completed    HPV IMMUNIZATION  Aged Out    MENINGITIS IMMUNIZATION  Aged Out    HEPATITIS C SCREENING  Discontinued         Review of Systems  Constitutional, HEENT, cardiovascular, pulmonary, gi and gu systems are negative, except as otherwise noted.     Objective    Exam  BP (!) 149/89 (BP Location: Left arm, Patient Position: Sitting, Cuff Size: Adult Regular)   Pulse 85   Temp 98.5  F (36.9  C) (Oral)   Resp 20   Ht 1.651 m (5' 5\")   Wt 91.9 kg (202 lb 11.2 oz)   SpO2 95%   BMI 33.73 kg/m     Estimated body mass index is 33.73 kg/m  as calculated from the following:    Height as of this encounter: 1.651 m (5' 5\").    Weight as of this encounter: 91.9 kg (202 lb 11.2 oz).    Physical Exam  GENERAL: alert and no distress  EYES: Eyes grossly normal to inspection, PERRL and conjunctivae and sclerae normal  HENT: ear canals and TM's normal, nose and mouth without ulcers or lesions  NECK: no adenopathy, no asymmetry, masses, or scars  RESP: lungs clear to auscultation - no rales, rhonchi or wheezes  CV: regular rate and rhythm, normal S1 S2, no S3 or S4, no murmur, click or rub, no peripheral edema  ABDOMEN: soft, nontender, no hepatosplenomegaly, no masses and bowel sounds normal  MS: no gross musculoskeletal defects noted, no edema  SKIN: no suspicious lesions or rashes  NEURO: Normal strength and tone, mentation intact and speech normal  PSYCH: " mentation appears normal, affect normal/bright         4/22/2025   Mini Cog   Clock Draw Score 2 Normal   3 Item Recall 3 objects recalled   Mini Cog Total Score 5              Signed Electronically by: LILI Clark CNP

## 2025-04-22 NOTE — TELEPHONE ENCOUNTER
RN called [facility] back and relayed provider's message.    RL Gomez  [facility]

## 2025-04-23 ENCOUNTER — PATIENT OUTREACH (OUTPATIENT)
Dept: CARE COORDINATION | Facility: CLINIC | Age: 65
End: 2025-04-23
Payer: COMMERCIAL

## 2025-04-23 LAB
ALBUMIN SERPL BCG-MCNC: 4.4 G/DL (ref 3.5–5.2)
ALP SERPL-CCNC: 57 U/L (ref 40–150)
ALT SERPL W P-5'-P-CCNC: 26 U/L (ref 0–70)
ANION GAP SERPL CALCULATED.3IONS-SCNC: 11 MMOL/L (ref 7–15)
AST SERPL W P-5'-P-CCNC: 20 U/L (ref 0–45)
BILIRUB SERPL-MCNC: 0.8 MG/DL
BUN SERPL-MCNC: 22.7 MG/DL (ref 8–23)
CALCIUM SERPL-MCNC: 9.9 MG/DL (ref 8.8–10.4)
CHLORIDE SERPL-SCNC: 103 MMOL/L (ref 98–107)
CHOLEST SERPL-MCNC: 181 MG/DL
CREAT SERPL-MCNC: 1.06 MG/DL (ref 0.67–1.17)
CREAT UR-MCNC: 140.6 MG/DL
EGFRCR SERPLBLD CKD-EPI 2021: 77 ML/MIN/1.73M2
FASTING STATUS PATIENT QL REPORTED: ABNORMAL
FASTING STATUS PATIENT QL REPORTED: ABNORMAL
GLUCOSE SERPL-MCNC: 166 MG/DL (ref 70–99)
HCO3 SERPL-SCNC: 26 MMOL/L (ref 22–29)
HDLC SERPL-MCNC: 40 MG/DL
LDLC SERPL CALC-MCNC: 117 MG/DL
MICROALBUMIN UR-MCNC: 192.5 MG/L
MICROALBUMIN/CREAT UR: 136.91 MG/G CR (ref 0–17)
NONHDLC SERPL-MCNC: 141 MG/DL
POTASSIUM SERPL-SCNC: 4.5 MMOL/L (ref 3.4–5.3)
PROT SERPL-MCNC: 7.5 G/DL (ref 6.4–8.3)
PSA SERPL DL<=0.01 NG/ML-MCNC: 0.44 NG/ML (ref 0–4.5)
SODIUM SERPL-SCNC: 140 MMOL/L (ref 135–145)
TRIGL SERPL-MCNC: 119 MG/DL
TSH SERPL DL<=0.005 MIU/L-ACNC: 2.01 UIU/ML (ref 0.3–4.2)

## 2025-04-24 RX ORDER — ATORVASTATIN CALCIUM 20 MG/1
20 TABLET, FILM COATED ORAL DAILY
Qty: 90 TABLET | Refills: 3 | Status: SHIPPED | OUTPATIENT
Start: 2025-04-24

## 2025-04-24 NOTE — RESULT ENCOUNTER NOTE
Arik Wilder.    It was good to see you again.     Your labs are back and there are some changes noted with you stopping your Diabetes medications unfortunately. Your A1c has increased, so I reordered about Metformin and Jardiance. Jardiance is important medication because it will aide in lowering the amount of protein in your urine. This med can also aide in weight loss. As for the metformin, you will want to start this med slowly over the next 4 weeks to help prevent Gi upset.     The Microalbumin/creatinine ratio urine test looks for very small levels of protein in the urine and is used to detect early signs of kidney damage.  Kidney damage can cause proteins to leak through the kidneys and exit the body to the kidneys.  Albumin is one of the first proteins to leak when the kidneys are damaged.  Some of the ways that the kidneys can be damaged is by diabetes and hypertension.      Your total cholesterol, HDL, and other metrics (age, sex, BP, and race) was reviewed by the ASCVD heart risk calculator, and according to the AHA/ACC guidelines, they suggest you start a statin medication to help lower your chances of a heart attack or stroke in the future. This med helps stabilize the plaque in your vessels. I will go ahead and order you a medication for you to start since it can sometimes be awhile before we will be able to visit again. Please take it once a day. Make sure you continue to eat a low saturated fat diet and try to exercise at least 150 minutes per week. Exercise helps with increasing your good cholesterol, called HDL. The mediterranean plan is a good resource if you are looking into options about other ways to eat a heart healthier diet.  Lets recheck this lab in 3-4 months at your next lab visit to ensure the medication is helping. Please come in fasting.      Other labs appear stable.     Please recheck your BP in two weeks with staff.   Please recheck your A1c and cholesterol in three months.     Brandy

## 2025-04-28 ENCOUNTER — PATIENT OUTREACH (OUTPATIENT)
Dept: CARE COORDINATION | Facility: CLINIC | Age: 65
End: 2025-04-28
Payer: COMMERCIAL

## 2025-05-22 ENCOUNTER — ALLIED HEALTH/NURSE VISIT (OUTPATIENT)
Dept: FAMILY MEDICINE | Facility: CLINIC | Age: 65
End: 2025-05-22
Payer: COMMERCIAL

## 2025-05-22 VITALS
DIASTOLIC BLOOD PRESSURE: 83 MMHG | RESPIRATION RATE: 16 BRPM | HEART RATE: 73 BPM | SYSTOLIC BLOOD PRESSURE: 135 MMHG | OXYGEN SATURATION: 97 %

## 2025-05-22 DIAGNOSIS — I10 HYPERTENSION, UNSPECIFIED TYPE: Primary | ICD-10-CM

## 2025-05-22 PROCEDURE — 99207 PR NO CHARGE NURSE ONLY: CPT

## 2025-05-22 PROCEDURE — 3074F SYST BP LT 130 MM HG: CPT

## 2025-05-22 PROCEDURE — 3079F DIAST BP 80-89 MM HG: CPT

## 2025-05-22 NOTE — PROGRESS NOTES
I met with Meño Sánchez at the request of Brandy Limon NP to recheck his blood pressure.  Blood pressure medications on the med list were reviewed with patient.    Patient has taken all medications as per usual regimen: Yes  Losartan 50 mg daily    Patient reports tolerating them without any issues or concerns: Yes    Vitals:    05/22/25 1524 05/22/25 1533   BP: 127/82 135/83   BP Location: Left arm Left arm   Patient Position: Sitting Sitting   Cuff Size: Adult Large Adult Large   Pulse: 79 73   Resp: 18 16   SpO2: 98% 97%     Blood pressure was taken, previous encounter was reviewed, recorded blood pressure below 140/90.  Patient was discharged and the note will be sent to the provider for final review.    Patient reports they have been feeling a little run down/ tired for a few weeks. Patient unsure if this started concurrently with start of Losartan increase. Patient having intermittent heartburn, managing with Tums PRN which are effective for relieving symptoms.    During medication review, patient reported that they are no longer taking aspirin. They ran out and did not replace them. Aspirin 81 mg twice daily was initially prescribed after TKA in 03/2021. Would PCP like patient to resume in taking aspirin 81 mg BID?    Sarah Velarde RN

## 2025-05-28 NOTE — PROGRESS NOTES
Writer sent [employer] message with PCP recommendation below per patient request.    Sarah Velarde RN

## 2025-06-15 ENCOUNTER — MYC MEDICAL ADVICE (OUTPATIENT)
Dept: FAMILY MEDICINE | Facility: CLINIC | Age: 65
End: 2025-06-15
Payer: COMMERCIAL

## 2025-06-16 ENCOUNTER — VIRTUAL VISIT (OUTPATIENT)
Dept: FAMILY MEDICINE | Facility: CLINIC | Age: 65
End: 2025-06-16
Payer: COMMERCIAL

## 2025-06-16 DIAGNOSIS — I10 HYPERTENSION, UNSPECIFIED TYPE: ICD-10-CM

## 2025-06-16 DIAGNOSIS — R09.3 ABNORMAL COLOR OF SPUTUM: ICD-10-CM

## 2025-06-16 DIAGNOSIS — E11.9 TYPE 2 DIABETES MELLITUS WITHOUT COMPLICATION, WITHOUT LONG-TERM CURRENT USE OF INSULIN (H): ICD-10-CM

## 2025-06-16 DIAGNOSIS — R05.1 ACUTE COUGH: Primary | ICD-10-CM

## 2025-06-16 DIAGNOSIS — Z87.891 FORMER CIGARETTE SMOKER: ICD-10-CM

## 2025-06-16 DIAGNOSIS — J01.00 ACUTE NON-RECURRENT MAXILLARY SINUSITIS: ICD-10-CM

## 2025-06-16 PROCEDURE — 98005 SYNCH AUDIO-VIDEO EST LOW 20: CPT | Performed by: NURSE PRACTITIONER

## 2025-06-16 PROCEDURE — 1126F AMNT PAIN NOTED NONE PRSNT: CPT | Mod: 95 | Performed by: NURSE PRACTITIONER

## 2025-06-16 NOTE — PROGRESS NOTES
Meño is a 64 year old who is being evaluated via a billable video visit.    How would you like to obtain your AVS? MyChart  If the video visit is dropped, the invitation should be resent by: Text to cell phone: [phone]  Will anyone else be joining your video visit? No      Assessment & Plan     Acute cough:  - Cough is severe at night, possibly related to sinusitis or pneumonia.  - Take prescribed antibiotic twice a day for 7 days. Use cough drops and stay hydrated to help thin mucus. Take Tylenol if needed.    Acute non-recurrent maxillary sinusitis:  - Sinus pressure and congestion present, with thick green sputum.  - Prescribed antibiotic is the gold standard for sinusitis treatment.    Abnormal color of sputum:  - Thick green sputum noted, possibly indicating infection.  - Prescribed antibiotic to address potential infection.    Consent was obtained from the patient to use an AI documentation tool in the creation of this note.    Encounter Diagnoses   Name Primary?    Acute cough Yes    Acute non-recurrent maxillary sinusitis     Abnormal color of sputum     Hypertension, unspecified type     Former cigarette smoker     Type 2 diabetes mellitus without complication, without long-term current use of insulin (H)        - BP normal. Stay on BP medications.   - get A1c done in July.       Follow-up    Follow-up Visit   Expected date:  Jul 16, 2025 (Approximate)      Follow Up Appointment Details:     Follow-up with whom?: Other Primary Care Services    Follow-Up for what?: Lab Visit Comment - a1c    How?: In Person             Follow-up Visit   Expected date:  Apr 21, 2026 (Approximate)      Follow Up Appointment Details:     Follow-up with whom?: Me    Follow-Up for what?: Medicare Wellness    Any Additional Chronic Condition Management?:  Hyperlipidemia  Hypertension  Depression  Anxiety       Welcome or Annual?: Annual Wellness    How?: In Person                 Subjective   Meño is a 64 year old, presenting  "for the following health issues:  URI      6/16/2025    11:29 AM   Additional Questions   Roomed by Hanna SILVESTRE MA     Video Start Time:     History of Present Illness-  Meño Sánchez, 64-year-old male  - Experiencing symptoms resembling a sinus and chest infection  - Sinus pressure and congestion with thick green secretions  - Coughing, sore throat particularly severe at night  - Shortness of breath, not extreme, able to walk around the house without significant difficulty or sob.   - Sore throat, worse at night  - No recent sick exposures  - No recent antibiotic use  - Blood pressure reported as mostly 125-133/80  - covid test negative     URI                Objective    Vitals - Patient Reported  Weight (Patient Reported): 91.6 kg (202 lb)  Height (Patient Reported): 165.1 cm (5' 5\")  BMI (Based on Pt Reported Ht/Wt): 33.61        Physical Exam   GENERAL: alert and no distress  EYES: Eyes grossly normal to inspection.  No discharge or erythema, or obvious scleral/conjunctival abnormalities.  RESP: No audible wheeze, cough, or visible cyanosis.  No sob noted.   SKIN: Visible skin clear. No significant rash, abnormal pigmentation or lesions.  NEURO: Cranial nerves grossly intact.  Mentation and speech appropriate for age.  PSYCH: Appropriate affect, tone, and pace of words    Office Visit on 04/22/2025   Component Date Value Ref Range Status    Estimated Average Glucose 04/22/2025 160 (H)  <117 mg/dL Final    Hemoglobin A1C 04/22/2025 7.2 (H)  0.0 - 5.6 % Final    Normal <5.7%   Prediabetes 5.7-6.4%    Diabetes 6.5% or higher     Note: Adopted from ADA consensus guidelines.    Sodium 04/22/2025 140  135 - 145 mmol/L Final    Potassium 04/22/2025 4.5  3.4 - 5.3 mmol/L Final    Carbon Dioxide (CO2) 04/22/2025 26  22 - 29 mmol/L Final    Anion Gap 04/22/2025 11  7 - 15 mmol/L Final    Urea Nitrogen 04/22/2025 22.7  8.0 - 23.0 mg/dL Final    Creatinine 04/22/2025 1.06  0.67 - 1.17 mg/dL Final    GFR Estimate 04/22/2025 " 77  >60 mL/min/1.73m2 Final    eGFR calculated using 2021 CKD-EPI equation.    Calcium 04/22/2025 9.9  8.8 - 10.4 mg/dL Final    Chloride 04/22/2025 103  98 - 107 mmol/L Final    Glucose 04/22/2025 166 (H)  70 - 99 mg/dL Final    Alkaline Phosphatase 04/22/2025 57  40 - 150 U/L Final    AST 04/22/2025 20  0 - 45 U/L Final    ALT 04/22/2025 26  0 - 70 U/L Final    Protein Total 04/22/2025 7.5  6.4 - 8.3 g/dL Final    Albumin 04/22/2025 4.4  3.5 - 5.2 g/dL Final    Bilirubin Total 04/22/2025 0.8  <=1.2 mg/dL Final    Patient Fasting > 8hrs? 04/22/2025 Unknown   Final    Cholesterol 04/22/2025 181  <200 mg/dL Final    Triglycerides 04/22/2025 119  <150 mg/dL Final    Direct Measure HDL 04/22/2025 40  >=40 mg/dL Final    LDL Cholesterol Calculated 04/22/2025 117 (H)  <100 mg/dL Final    Non HDL Cholesterol 04/22/2025 141 (H)  <130 mg/dL Final    Patient Fasting > 8hrs? 04/22/2025 Unknown   Final    Creatinine Urine mg/dL 04/22/2025 140.6  mg/dL Final    The reference ranges have not been established in urine creatinine. The results should be integrated into the clinical context for interpretation.    Albumin Urine mg/L 04/22/2025 192.5  mg/L Final    The reference ranges have not been established in urine albumin. The results should be integrated into the clinical context for interpretation.    Albumin Urine mg/g Cr 04/22/2025 136.91 (H)  0.00 - 17.00 mg/g Cr Final    Microalbuminuria is defined as an albumin:creatinine ratio of 17 to 299 for males and 25 to 299 for females. A ratio of albumin:creatinine of 300 or higher is indicative of overt proteinuria.  Due to biologic variability, positive results should be confirmed by a second, first-morning random or 24-hour timed urine specimen. If there is discrepancy, a third specimen is recommended. When 2 out of 3 results are in the microalbuminuria range, this is evidence for incipient nephropathy and warrants increased efforts at glucose control, blood pressure control,  and institution of therapy with an angiotensin-converting-enzyme (ACE) inhibitor (if the patient can tolerate it).      Prostate Specific Antigen Screen 04/22/2025 0.44  0.00 - 4.50 ng/mL Final    WBC Count 04/22/2025 7.7  4.0 - 11.0 10e3/uL Final    RBC Count 04/22/2025 5.01  4.40 - 5.90 10e6/uL Final    Hemoglobin 04/22/2025 16.0  13.3 - 17.7 g/dL Final    Hematocrit 04/22/2025 46.5  40.0 - 53.0 % Final    MCV 04/22/2025 93  78 - 100 fL Final    MCH 04/22/2025 31.9  26.5 - 33.0 pg Final    MCHC 04/22/2025 34.4  31.5 - 36.5 g/dL Final    RDW 04/22/2025 12.7  10.0 - 15.0 % Final    Platelet Count 04/22/2025 231  150 - 450 10e3/uL Final    TSH 04/22/2025 2.01  0.30 - 4.20 uIU/mL Final         Video-Visit Details    Type of service:  Video Visit   Video End Time:  Originating Location (pt. Location): Home    Distant Location (provider location):  On-site  Platform used for Video Visit: Giovanni  Signed Electronically by: LILI Clark CNP

## 2025-07-03 ENCOUNTER — ANCILLARY PROCEDURE (OUTPATIENT)
Dept: GENERAL RADIOLOGY | Facility: CLINIC | Age: 65
End: 2025-07-03
Attending: FAMILY MEDICINE
Payer: COMMERCIAL

## 2025-07-03 ENCOUNTER — OFFICE VISIT (OUTPATIENT)
Dept: FAMILY MEDICINE | Facility: CLINIC | Age: 65
End: 2025-07-03
Payer: COMMERCIAL

## 2025-07-03 VITALS
WEIGHT: 194.3 LBS | HEIGHT: 65 IN | BODY MASS INDEX: 32.37 KG/M2 | OXYGEN SATURATION: 96 % | TEMPERATURE: 99.1 F | HEART RATE: 76 BPM | DIASTOLIC BLOOD PRESSURE: 80 MMHG | RESPIRATION RATE: 18 BRPM | SYSTOLIC BLOOD PRESSURE: 130 MMHG

## 2025-07-03 DIAGNOSIS — R05.1 ACUTE COUGH: Primary | ICD-10-CM

## 2025-07-03 RX ORDER — PREDNISONE 20 MG/1
20 TABLET ORAL 2 TIMES DAILY
Qty: 10 TABLET | Refills: 0 | Status: SHIPPED | OUTPATIENT
Start: 2025-07-03 | End: 2025-07-08

## 2025-07-03 ASSESSMENT — ENCOUNTER SYMPTOMS: COUGH: 1

## 2025-07-03 NOTE — PROGRESS NOTES
Assessment & Plan     (R05.1) Acute cough  (primary encounter diagnosis)  Comment: 1 month history of a cough, probably some type of postinfectious or postinflammatory cough at this point.  Plan: XR Chest 2 Views, predniSONE (DELTASONE) 20 MG         tablet             PLAN:  1.  Two-view chest x-ray reviewed no sign of pneumonia or any acute cardiopulmonary process  2..  Treatment with prednisone 20 mg twice daily x 5 days, explained to the patient that I do not think he would benefit from further antibiotic treatment he can also continue cough medicine or cough drops and follow-up as needed.                Subjective   Meño is a 64 year old, presenting for the following health issues:  Cough (Productive cough for about one month, was prescribed amoxicillin, finished the last dose about one week ago, still has cough )    Cough    History of Present Illness       Reason for visit:  Cough. Lung congestion. Sore throat    He eats 0-1 servings of fruits and vegetables daily.He consumes 1 sweetened beverage(s) daily.He exercises with enough effort to increase his heart rate 10 to 19 minutes per day.  He exercises with enough effort to increase his heart rate 5 days per week.   He is taking medications regularly.   - Meño Sánchez, 64-year-old male.  - Feeling unwell for about a month prior to the encounter.  - Symptoms include cough, sore throat, and chest congestion.  - Sinus issues improved after taking amoxicillin.  - History of annual sinus infections, resolved by quitting milk consumption.  - No history of asthma or breathing problems.  - Experienced a temperature of 99.1 F, but thermometer malfunctioned.  - Runny nose not as severe recently.  - No allergies or hay fever reported.  - Occasionally uses cough drops but rarely takes them.  - Reports tightness in the chest and wheezing.  - Difficulty sleeping at night; tried soda with THC to aid sleep, resulting in improved sleep but still not sleeping through the  "night.                  Objective    /80 (BP Location: Left arm, Patient Position: Sitting, Cuff Size: Adult Regular)   Pulse 76   Temp 99.1  F (37.3  C) (Oral)   Resp 18   Ht 1.651 m (5' 5\")   Wt 88.1 kg (194 lb 4.8 oz)   SpO2 96%   BMI 32.33 kg/m    Body mass index is 32.33 kg/m .  Physical Exam   GENERAL: alert and no distress  EYES: Eyes grossly normal to inspection, PERRL and conjunctivae and sclerae normal  HENT: ear canals and TM's normal, nose and mouth without ulcers or lesions  NECK: no adenopathy, no asymmetry, masses, or scars  RESP: lungs clear to auscultation - no rales, rhonchi or wheezes  CV: regular rate and rhythm, normal S1 S2, no S3 or S4, no murmur, click or rub, no peripheral edema  MS: no gross musculoskeletal defects noted, no edema            Signed Electronically by: Juan Weeks MD    "

## 2025-07-24 ENCOUNTER — LAB (OUTPATIENT)
Dept: LAB | Facility: CLINIC | Age: 65
End: 2025-07-24
Attending: NURSE PRACTITIONER
Payer: COMMERCIAL

## 2025-07-24 DIAGNOSIS — E11.29 TYPE 2 DIABETES MELLITUS WITH ALBUMINURIA (H): ICD-10-CM

## 2025-07-24 DIAGNOSIS — E11.9 TYPE 2 DIABETES MELLITUS WITHOUT COMPLICATION, WITHOUT LONG-TERM CURRENT USE OF INSULIN (H): ICD-10-CM

## 2025-07-24 DIAGNOSIS — E78.5 HYPERLIPIDEMIA LDL GOAL <70: ICD-10-CM

## 2025-07-24 DIAGNOSIS — I10 HYPERTENSION, UNSPECIFIED TYPE: ICD-10-CM

## 2025-07-24 DIAGNOSIS — R80.9 TYPE 2 DIABETES MELLITUS WITH ALBUMINURIA (H): ICD-10-CM

## 2025-07-24 DIAGNOSIS — E11.42 DIABETIC POLYNEUROPATHY ASSOCIATED WITH TYPE 2 DIABETES MELLITUS (H): ICD-10-CM

## 2025-07-24 LAB
CHOLEST SERPL-MCNC: 91 MG/DL
CREAT SERPL-MCNC: 1.4 MG/DL (ref 0.67–1.17)
EGFRCR SERPLBLD CKD-EPI 2021: 55 ML/MIN/1.73M2
EST. AVERAGE GLUCOSE BLD GHB EST-MCNC: 143 MG/DL
FASTING STATUS PATIENT QL REPORTED: YES
HBA1C MFR BLD: 6.6 % (ref 0–5.6)
HDLC SERPL-MCNC: 38 MG/DL
LDLC SERPL CALC-MCNC: 37 MG/DL
NONHDLC SERPL-MCNC: 53 MG/DL
TRIGL SERPL-MCNC: 80 MG/DL

## (undated) DEVICE — SU VICRYL 2-0 CT-1 27" UND J259H

## (undated) DEVICE — SU VICRYL 1 MO-4 18" J702D

## (undated) DEVICE — SUCTION MANIFOLD NEPTUNE 2 SYS 4 PORT 0702-020-000

## (undated) DEVICE — DRSG ADAPTIC 3X8" 6113

## (undated) DEVICE — GLOVE PROTEXIS BLUE W/NEU-THERA 8.0  2D73EB80

## (undated) DEVICE — SOL WATER IRRIG 1000ML BOTTLE 2F7114

## (undated) DEVICE — TOURNIQUET SGL  BLADDER 30"X4" BLUE 5921030135

## (undated) DEVICE — LINEN HALF SHEET 5512

## (undated) DEVICE — IMM KNEE 20"

## (undated) DEVICE — CAST PADDING 6" STERILE 9046S

## (undated) DEVICE — LINEN ORTHO ACL PACK 5447

## (undated) DEVICE — GLOVE PROTEXIS POWDER FREE 7.5 ORTHOPEDIC 2D73ET75

## (undated) DEVICE — BLADE SAW SAGITTAL STRK 18X90X1.27MM HD SYS 6 6118-127-090

## (undated) DEVICE — SOL NACL 0.9% IRRIG 3000ML BAG 2B7477

## (undated) DEVICE — SET HANDPIECE INTERPULSE W/COAXIAL FAN SPRAY TIP 0210118000

## (undated) DEVICE — BAG CLEAR TRASH 1.3M 39X33" P4040C

## (undated) DEVICE — DRSG GAUZE 4X4" TRAY

## (undated) DEVICE — ESU GROUND PAD ADULT W/CORD E7507

## (undated) DEVICE — LINEN FULL SHEET 5511

## (undated) DEVICE — DRAIN HEMOVAC RESERVOIR KIT 10FR 1/8" MED 00-2550-002-10

## (undated) DEVICE — BONE CEMENT MIXEVAC III HI VAC KIT  0206-015-000

## (undated) DEVICE — PACK TOTAL KNEE BOXED LATEX FREE PO15TKFCT

## (undated) DEVICE — DRSG ABDOMINAL 07 1/2X8" 7197D

## (undated) RX ORDER — FENTANYL CITRATE 50 UG/ML
INJECTION, SOLUTION INTRAMUSCULAR; INTRAVENOUS
Status: DISPENSED
Start: 2021-03-23

## (undated) RX ORDER — DEXAMETHASONE SODIUM PHOSPHATE 4 MG/ML
INJECTION, SOLUTION INTRA-ARTICULAR; INTRALESIONAL; INTRAMUSCULAR; INTRAVENOUS; SOFT TISSUE
Status: DISPENSED
Start: 2021-03-23

## (undated) RX ORDER — TRANEXAMIC ACID 650 MG/1
TABLET ORAL
Status: DISPENSED
Start: 2021-03-23

## (undated) RX ORDER — ACETAMINOPHEN 325 MG/1
TABLET ORAL
Status: DISPENSED
Start: 2021-03-23

## (undated) RX ORDER — ONDANSETRON 2 MG/ML
INJECTION INTRAMUSCULAR; INTRAVENOUS
Status: DISPENSED
Start: 2021-03-23

## (undated) RX ORDER — CEFAZOLIN SODIUM 2 G/100ML
INJECTION, SOLUTION INTRAVENOUS
Status: DISPENSED
Start: 2021-03-23

## (undated) RX ORDER — GLYCOPYRROLATE 0.2 MG/ML
INJECTION INTRAMUSCULAR; INTRAVENOUS
Status: DISPENSED
Start: 2021-03-23

## (undated) RX ORDER — PROPOFOL 10 MG/ML
INJECTION, EMULSION INTRAVENOUS
Status: DISPENSED
Start: 2021-03-23

## (undated) RX ORDER — OXYCODONE HCL 10 MG/1
TABLET, FILM COATED, EXTENDED RELEASE ORAL
Status: DISPENSED
Start: 2021-03-23

## (undated) RX ORDER — LIDOCAINE HYDROCHLORIDE 10 MG/ML
INJECTION, SOLUTION EPIDURAL; INFILTRATION; INTRACAUDAL; PERINEURAL
Status: DISPENSED
Start: 2021-03-23